# Patient Record
Sex: MALE | Race: WHITE | NOT HISPANIC OR LATINO | Employment: OTHER | URBAN - METROPOLITAN AREA
[De-identification: names, ages, dates, MRNs, and addresses within clinical notes are randomized per-mention and may not be internally consistent; named-entity substitution may affect disease eponyms.]

---

## 2017-01-06 ENCOUNTER — HOSPITAL ENCOUNTER (OUTPATIENT)
Dept: NUCLEAR MEDICINE | Facility: HOSPITAL | Age: 62
Discharge: HOME | End: 2017-01-06
Attending: INTERNAL MEDICINE

## 2017-01-06 DIAGNOSIS — K21.00 GASTROESOPHAGEAL REFLUX DISEASE WITH ESOPHAGITIS: ICD-10-CM

## 2017-01-06 PROCEDURE — A9541 TC99M SULFUR COLLOID: HCPCS | Performed by: INTERNAL MEDICINE

## 2017-01-06 PROCEDURE — 0 TECHNETIUM SULFUR COLLOID: Performed by: INTERNAL MEDICINE

## 2017-01-06 PROCEDURE — 78264 GASTRIC EMPTYING IMG STUDY: CPT

## 2017-01-06 RX ADMIN — Medication 1 DOSE: at 07:35

## 2017-01-09 ENCOUNTER — DOCUMENTATION (OUTPATIENT)
Dept: GASTROENTEROLOGY | Facility: CLINIC | Age: 62
End: 2017-01-09

## 2017-01-10 ENCOUNTER — TELEPHONE (OUTPATIENT)
Dept: GASTROENTEROLOGY | Facility: CLINIC | Age: 62
End: 2017-01-10

## 2017-01-10 NOTE — TELEPHONE ENCOUNTER
----- Message from Susi Nolen MD sent at 1/5/2017  4:15 PM EST -----  All biopsies from egd were normal. GES scheduled for tomorrow for further eval.  Schedule 4-6 wk op check with me or np

## 2017-01-10 NOTE — TELEPHONE ENCOUNTER
"Call to pt.  Advise per Dr Nolen that GES was normal - schedule f/u in 6-8 weeks.  Pt verb understanding and states \"well there is something wrong here - I'm having to take Carafate and Pantoprazole to keep my symptoms under control - so something must be wrong.  I don't want to wait 8 weeks to figure something out.\"   Advise pt will send message to Dr Nolen.  Pt verb understanding.    "

## 2017-01-10 NOTE — TELEPHONE ENCOUNTER
Call to pt.  Advise per DR Nolen that all bx from egd were normal.  Pt verb understanding - see note from 1/10 re: GES.

## 2017-01-18 ENCOUNTER — OFFICE VISIT (OUTPATIENT)
Dept: GASTROENTEROLOGY | Facility: CLINIC | Age: 62
End: 2017-01-18

## 2017-01-18 VITALS
SYSTOLIC BLOOD PRESSURE: 126 MMHG | HEIGHT: 68 IN | WEIGHT: 268.2 LBS | BODY MASS INDEX: 40.65 KG/M2 | DIASTOLIC BLOOD PRESSURE: 74 MMHG

## 2017-01-18 DIAGNOSIS — R12 HEARTBURN: Primary | ICD-10-CM

## 2017-01-18 DIAGNOSIS — R11.0 NAUSEA: ICD-10-CM

## 2017-01-18 PROCEDURE — 99213 OFFICE O/P EST LOW 20 MIN: CPT | Performed by: INTERNAL MEDICINE

## 2017-01-18 NOTE — MR AVS SNAPSHOT
Antony Tatum   2017 1:00 PM   Office Visit    Dept Phone:  [phone]   Encounter #:  [id number]    Provider:  Susi Nolen MD   Department:  [facility]                Your Full Care Plan              Your Updated Medication List          This list is accurate as of: 17  3:31 PM.  Always use your most recent med list.                aspirin 81 MG tablet       atorvastatin 20 MG tablet   Commonly known as:  LIPITOR       lisinopril 5 MG tablet   Commonly known as:  PRINIVIL,ZESTRIL       pantoprazole 40 MG EC tablet   Commonly known as:  PROTONIX   Take 1 tablet by mouth 2 (Two) Times a Day. TK ONE T PO QD       sucralfate 1 G tablet   Commonly known as:  CARAFATE   Take 1 tablet by mouth 3 (Three) Times a Day.       Testosterone Cypionate 200 MG/ML injection   Commonly known as:  DEPOTESTOTERONE CYPIONATE       zolpidem 10 MG tablet   Commonly known as:  AMBIEN               You Were Diagnosed With        Codes Comments    Heartburn    -  Primary ICD-10-CM: R12  ICD-9-CM: 787.1     Nausea     ICD-10-CM: R11.0  ICD-9-CM: 787.02       Instructions     None    Patient Instructions History      Upcoming Appointments     Visit Type Date Time Department    FOLLOW UP 2017  1:00 PM [facility]      [employer] Signup     [facility] [employer] allows you to send messages to your doctor, view your test results, renew your prescriptions, schedule appointments, and more. To sign up, go to [employer]Sheet and click on the Sign Up Now link in the New User? box. Enter your [employer] Activation Code exactly as it appears below along with the last four digits of your Social Security Number and your Date of Birth () to complete the sign-up process. If you do not sign up before the expiration date, you must request a new code.    [employer] Activation Code: [email or web address]  Expires: 2017  5:36 AM    If you have questions, you can  "email [email or web address] or call [phone] to talk to our MyChart staff. Remember, [email or web address] is NOT to be used for urgent needs. For medical emergencies, dial 911.               Other Info from Your Visit           Allergies     No Known Allergies      Reason for Visit     Follow-up GES results     Heartburn     Abdominal Pain           Vital Signs     Blood Pressure Height Weight Body Mass Index Smoking Status       126/74 68\" (172.7 cm) 268 lb 3.2 oz (122 kg) 40.78 kg/m2 Never Smoker       Problems and Diagnoses Noted     Heartburn    -  Primary    Nauseous            "

## 2017-01-18 NOTE — PROGRESS NOTES
Chief Complaint   Patient presents with   • Follow-up     GES results    • Heartburn   • Abdominal Pain       Antony Tatum is a  61 y.o. male here for a follow up visit for heartburn and nausea. This heartburn is well controlled with pantoprazole once a day.  There was a time he took it twice a day he has backed off on the with improvement in his symptoms.He recently underwent an EGDhe did have some mild erythema in the stomach however biopsies showed no inflammation.  Small bowel biopsies were negative for celiac disease.  I started him on Carafate at that time he has been using this to 3 times a day as needed.  He continues to have a low-level nausea most days.  He does not for up.  He does not have any abdominal pain.  He recently completed a gastric emptying test which was normal.  He has gallbladder out 5-6 years ago.  He has had a recent colonoscopy for screening.  He reports a previous CT scan 5-6 years ago at the time of his cholecystectomy.  He has had no recent abdominal imaging.  HPI  Past Medical History   Diagnosis Date   • Diverticulosis    • Erosive gastritis    • H/O CT scan of abdomen 01/29/2014     ddd, tics, tortuosity w/aortoiliac atherosclerosis, HH, cardiac enlargement   • Hepatitis    • Hiatal hernia    • History of CT scan of abdomen    • Hyperlipidemia    • Hypertension    • Obese      Past Surgical History   Procedure Laterality Date   • Cervical discectomy       anterior spinal, osteophytectomy cerv interspace   • Coronary angioplasty with stent placement     • Gallbladder surgery     • Cervical discectomy  12/07/2015     spinal; C5-C6 and C6-C7 anterior cervical discectomy and arthrodesis; Jon Lua   • Upper gastrointestinal endoscopy  02/12/2014     LA Grade A reflux esophagitis, HH, erosive gtastritis, bx   • Cholecystectomy     • Colonoscopy  06/10/2016     normal -Strothman M.D.   • Endoscopy N/A 12/28/2016     Procedure: ESOPHAGOGASTRODUODENOSCOPY with Biopsies;  Surgeon:  Susi Nolen MD;  Location: [facility] ENDOSCOPY;  Service:        Current Outpatient Prescriptions:   •  aspirin 81 MG tablet, Take by mouth., Disp: , Rfl:   •  atorvastatin (LIPITOR) 20 MG tablet, Take  by mouth. Take one tablet three days a week, Disp: , Rfl:   •  lisinopril (PRINIVIL,ZESTRIL) 5 MG tablet, Take  by mouth. Take one tablet three days a week, Disp: , Rfl:   •  pantoprazole (PROTONIX) 40 MG EC tablet, Take 1 tablet by mouth 2 (Two) Times a Day. TK ONE T PO QD, Disp: 60 tablet, Rfl: 11  •  sucralfate (CARAFATE) 1 G tablet, Take 1 tablet by mouth 3 (Three) Times a Day., Disp: 90 tablet, Rfl: 3  •  Testosterone Cypionate (DEPOTESTOTERONE CYPIONATE) 200 MG/ML injection, INJECT 1 ML Q 2 WEEKS, Disp: , Rfl: 0  •  zolpidem (AMBIEN) 10 MG tablet, at night as needed., Disp: , Rfl: 1  PRN Meds:.  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   Gastrointestinal: Positive for nausea. Negative for abdominal distention, abdominal pain, constipation and diarrhea.     Vitals:    01/18/17 1309   BP: 126/74     Last Weight    01/18/17  1309   Weight: 268 lb 3.2 oz (122 kg)     Physical Exam   Constitutional: He is oriented to person, place, and time. He appears well-developed and well-nourished.   HENT:   Head: Normocephalic and atraumatic.   Eyes: Conjunctivae are normal. No scleral icterus.   Neck: Normal  range of motion. Neck supple.   Cardiovascular: Normal rate and regular rhythm.    Pulmonary/Chest: Effort normal and breath sounds normal.   Musculoskeletal: Normal range of motion. He exhibits no edema.   Neurological: He is alert and oriented to person, place, and time.   Skin: Skin is warm and dry.   Psychiatric: He has a normal mood and affect.   Nursing note and vitals reviewed.    No images are attached to the encounter.  Diagnoses and all orders for this visit:    Heartburn    Nausea    Plan-  -Long discussion with the patient about his symptoms, diet modification and risks associated with long-term use of PPIs.  He is stable on pantoprazole once a day.  I have given him samples of Dexilant to see if he has any improvement in his nausea with this medication.  I have stressed to him the importance of diet modification and weight loss as these will probably improve his acid reflux symptoms significantly.  I have also asked him to consider having a CT scan of his abdomen and pelvis giving has ongoing nausea to evaluate his pancreas.  He does not consume excessive caffeine or alcohol and he does not smoke.  He will contact me if he would like to try Dexilant or if he wishes to proceed with a CT scan.

## 2017-02-02 ENCOUNTER — TELEPHONE (OUTPATIENT)
Dept: GASTROENTEROLOGY | Facility: CLINIC | Age: 62
End: 2017-02-02

## 2017-02-02 RX ORDER — PANTOPRAZOLE SODIUM 40 MG/1
40 TABLET, DELAYED RELEASE ORAL 2 TIMES DAILY
Qty: 60 TABLET | Refills: 11 | Status: SHIPPED | OUTPATIENT
Start: 2017-02-02 | End: 2018-01-17 | Stop reason: SDUPTHER

## 2017-02-02 NOTE — TELEPHONE ENCOUNTER
Called pt and pt confirmed he takes pantoprazole 40mg every day and many nights he takes a night time dose.  Advised we would escribe pantoprazole 40mg po bid to his [facility] pharmacy..  Pt verb understanding.

## 2017-02-02 NOTE — TELEPHONE ENCOUNTER
----- Message from Jessi Sanon sent at 2/2/2017 12:06 PM EST -----  Regarding: PT CALLED - PANTOPRAZOLE 40 MG BID   Contact: [phone]  PT WAS ADVISED TO CALL IF HE NEEDED A SCRIPT. PHARM [facility].

## 2017-03-17 ENCOUNTER — TRANSCRIBE ORDERS (OUTPATIENT)
Dept: ADMINISTRATIVE | Facility: HOSPITAL | Age: 62
End: 2017-03-17

## 2017-03-17 DIAGNOSIS — R07.9 CHEST PAIN, UNSPECIFIED TYPE: Primary | ICD-10-CM

## 2017-03-27 ENCOUNTER — APPOINTMENT (OUTPATIENT)
Dept: CARDIOLOGY | Facility: HOSPITAL | Age: 62
End: 2017-03-27

## 2017-03-28 ENCOUNTER — APPOINTMENT (OUTPATIENT)
Dept: CARDIOLOGY | Facility: HOSPITAL | Age: 62
End: 2017-03-28

## 2017-03-28 ENCOUNTER — HOSPITAL ENCOUNTER (OUTPATIENT)
Dept: CARDIOLOGY | Facility: HOSPITAL | Age: 62
Discharge: HOME | End: 2017-03-28
Admitting: PHYSICIAN ASSISTANT

## 2017-03-28 DIAGNOSIS — R07.9 CHEST PAIN, UNSPECIFIED TYPE: ICD-10-CM

## 2017-03-28 LAB
BH CV STRESS BP STAGE 1: NORMAL
BH CV STRESS BP STAGE 2: NORMAL
BH CV STRESS BP STAGE 3: NORMAL
BH CV STRESS DURATION MIN STAGE 1: 3
BH CV STRESS DURATION MIN STAGE 2: 3
BH CV STRESS DURATION MIN STAGE 3: 1
BH CV STRESS DURATION SEC STAGE 1: 0
BH CV STRESS DURATION SEC STAGE 2: 0
BH CV STRESS DURATION SEC STAGE 3: 45
BH CV STRESS GRADE STAGE 1: 10
BH CV STRESS GRADE STAGE 2: 12
BH CV STRESS GRADE STAGE 3: 14
BH CV STRESS HR STAGE 1: 106
BH CV STRESS HR STAGE 2: 121
BH CV STRESS HR STAGE 3: 133
BH CV STRESS METS STAGE 1: 5
BH CV STRESS METS STAGE 2: 7.5
BH CV STRESS METS STAGE 3: 10
BH CV STRESS PROTOCOL 1: NORMAL
BH CV STRESS RECOVERY BP: NORMAL MMHG
BH CV STRESS RECOVERY HR: 100 BPM
BH CV STRESS SPEED STAGE 1: 1.7
BH CV STRESS SPEED STAGE 2: 2.5
BH CV STRESS SPEED STAGE 3: 3.4
BH CV STRESS STAGE 1: 1
BH CV STRESS STAGE 2: 2
BH CV STRESS STAGE 3: 3
MAXIMAL PREDICTED HEART RATE: 157 BPM
PERCENT MAX PREDICTED HR: 85.35 %
STRESS BASELINE BP: NORMAL MMHG
STRESS BASELINE HR: 80 BPM
STRESS PERCENT HR: 100 %
STRESS POST ESTIMATED WORKLOAD: 8.9 METS
STRESS POST EXERCISE DUR MIN: 7 MIN
STRESS POST EXERCISE DUR SEC: 45 SEC
STRESS POST PEAK BP: NORMAL MMHG
STRESS POST PEAK HR: 134 BPM
STRESS TARGET HR: 133 BPM

## 2017-03-28 PROCEDURE — 93017 CV STRESS TEST TRACING ONLY: CPT

## 2017-03-28 PROCEDURE — 93016 CV STRESS TEST SUPVJ ONLY: CPT | Performed by: INTERNAL MEDICINE

## 2017-03-28 PROCEDURE — 93018 CV STRESS TEST I&R ONLY: CPT | Performed by: INTERNAL MEDICINE

## 2017-04-06 ENCOUNTER — HOSPITAL ENCOUNTER (OUTPATIENT)
Dept: CARDIOLOGY | Facility: HOSPITAL | Age: 62
Discharge: HOME | End: 2017-04-06
Admitting: PHYSICIAN ASSISTANT

## 2017-04-06 VITALS
HEIGHT: 68 IN | BODY MASS INDEX: 40.16 KG/M2 | HEART RATE: 74 BPM | WEIGHT: 265 LBS | DIASTOLIC BLOOD PRESSURE: 82 MMHG | SYSTOLIC BLOOD PRESSURE: 152 MMHG

## 2017-04-06 DIAGNOSIS — R07.9 CHEST PAIN, UNSPECIFIED TYPE: ICD-10-CM

## 2017-04-06 LAB
ASCENDING AORTA: 3.9 CM
BH CV ECHO MEAS - AO MAX PG (FULL): 10 MMHG
BH CV ECHO MEAS - AO MAX PG: 13.8 MMHG
BH CV ECHO MEAS - AO MEAN PG (FULL): 4.4 MMHG
BH CV ECHO MEAS - AO MEAN PG: 6.5 MMHG
BH CV ECHO MEAS - AO ROOT AREA (BSA CORRECTED): 1.7
BH CV ECHO MEAS - AO ROOT AREA: 12.5 CM^2
BH CV ECHO MEAS - AO ROOT DIAM: 4 CM
BH CV ECHO MEAS - AO V2 MAX: 185.7 CM/SEC
BH CV ECHO MEAS - AO V2 MEAN: 112.9 CM/SEC
BH CV ECHO MEAS - AO V2 VTI: 31.4 CM
BH CV ECHO MEAS - AVA(I,A): 2 CM^2
BH CV ECHO MEAS - AVA(I,D): 2 CM^2
BH CV ECHO MEAS - AVA(V,A): 1.9 CM^2
BH CV ECHO MEAS - AVA(V,D): 1.9 CM^2
BH CV ECHO MEAS - BSA(HAYCOCK): 2.5 M^2
BH CV ECHO MEAS - BSA: 2.3 M^2
BH CV ECHO MEAS - BZI_BMI: 40.3 KILOGRAMS/M^2
BH CV ECHO MEAS - BZI_METRIC_HEIGHT: 172.7 CM
BH CV ECHO MEAS - BZI_METRIC_WEIGHT: 120.2 KG
BH CV ECHO MEAS - CONTRAST EF (2CH): 57.3 ML/M^2
BH CV ECHO MEAS - CONTRAST EF 4CH: 53.1 ML/M^2
BH CV ECHO MEAS - EDV(MOD-SP2): 75 ML
BH CV ECHO MEAS - EDV(MOD-SP4): 96 ML
BH CV ECHO MEAS - EDV(TEICH): 152.8 ML
BH CV ECHO MEAS - EF(CUBED): 84.3 %
BH CV ECHO MEAS - EF(MOD-SP2): 57.3 %
BH CV ECHO MEAS - EF(MOD-SP4): 53.1 %
BH CV ECHO MEAS - EF(TEICH): 76.9 %
BH CV ECHO MEAS - ESV(MOD-SP2): 32 ML
BH CV ECHO MEAS - ESV(MOD-SP4): 45 ML
BH CV ECHO MEAS - ESV(TEICH): 35.3 ML
BH CV ECHO MEAS - FS: 46.1 %
BH CV ECHO MEAS - IVS/LVPW: 0.96
BH CV ECHO MEAS - IVSD: 1.3 CM
BH CV ECHO MEAS - LAT PEAK E' VEL: 10 CM/SEC
BH CV ECHO MEAS - LV DIASTOLIC VOL/BSA (35-75): 41.7 ML/M^2
BH CV ECHO MEAS - LV MASS(C)D: 307.8 GRAMS
BH CV ECHO MEAS - LV MASS(C)DI: 133.6 GRAMS/M^2
BH CV ECHO MEAS - LV MAX PG: 3.8 MMHG
BH CV ECHO MEAS - LV MEAN PG: 2 MMHG
BH CV ECHO MEAS - LV SYSTOLIC VOL/BSA (12-30): 19.5 ML/M^2
BH CV ECHO MEAS - LV V1 MAX: 97.5 CM/SEC
BH CV ECHO MEAS - LV V1 MEAN: 64.4 CM/SEC
BH CV ECHO MEAS - LV V1 VTI: 17.5 CM
BH CV ECHO MEAS - LVIDD: 5.6 CM
BH CV ECHO MEAS - LVIDS: 3 CM
BH CV ECHO MEAS - LVLD AP2: 8.6 CM
BH CV ECHO MEAS - LVLD AP4: 8.8 CM
BH CV ECHO MEAS - LVLS AP2: 7.3 CM
BH CV ECHO MEAS - LVLS AP4: 7.8 CM
BH CV ECHO MEAS - LVOT AREA (M): 3.5 CM^2
BH CV ECHO MEAS - LVOT AREA: 3.5 CM^2
BH CV ECHO MEAS - LVOT DIAM: 2.1 CM
BH CV ECHO MEAS - LVPWD: 1.3 CM
BH CV ECHO MEAS - MED PEAK E' VEL: 6 CM/SEC
BH CV ECHO MEAS - MV A DUR: 0.16 SEC
BH CV ECHO MEAS - MV A MAX VEL: 86.3 CM/SEC
BH CV ECHO MEAS - MV DEC SLOPE: 253.9 CM/SEC^2
BH CV ECHO MEAS - MV DEC TIME: 0.23 SEC
BH CV ECHO MEAS - MV E MAX VEL: 58.2 CM/SEC
BH CV ECHO MEAS - MV E/A: 0.67
BH CV ECHO MEAS - MV MAX PG: 2.7 MMHG
BH CV ECHO MEAS - MV MEAN PG: 1.4 MMHG
BH CV ECHO MEAS - MV P1/2T MAX VEL: 63.5 CM/SEC
BH CV ECHO MEAS - MV P1/2T: 73.3 MSEC
BH CV ECHO MEAS - MV V2 MAX: 82.5 CM/SEC
BH CV ECHO MEAS - MV V2 MEAN: 56.3 CM/SEC
BH CV ECHO MEAS - MV V2 VTI: 23.8 CM
BH CV ECHO MEAS - MVA P1/2T LCG: 3.5 CM^2
BH CV ECHO MEAS - MVA(P1/2T): 3 CM^2
BH CV ECHO MEAS - MVA(VTI): 2.6 CM^2
BH CV ECHO MEAS - PA ACC TIME: 0.09 SEC
BH CV ECHO MEAS - PA MAX PG (FULL): 1.4 MMHG
BH CV ECHO MEAS - PA MAX PG: 3.7 MMHG
BH CV ECHO MEAS - PA PR(ACCEL): 37.8 MMHG
BH CV ECHO MEAS - PA V2 MAX: 95.6 CM/SEC
BH CV ECHO MEAS - PULM A REVS DUR: 0.13 SEC
BH CV ECHO MEAS - PULM A REVS VEL: 64.3 CM/SEC
BH CV ECHO MEAS - PULM DIAS VEL: 23.5 CM/SEC
BH CV ECHO MEAS - PULM S/D: 1.9
BH CV ECHO MEAS - PULM SYS VEL: 45.7 CM/SEC
BH CV ECHO MEAS - PVA(V,A): 3.4 CM^2
BH CV ECHO MEAS - PVA(V,D): 3.4 CM^2
BH CV ECHO MEAS - QP/QS: 0.8
BH CV ECHO MEAS - RV MAX PG: 2.2 MMHG
BH CV ECHO MEAS - RV MEAN PG: 1.2 MMHG
BH CV ECHO MEAS - RV V1 MAX: 74.2 CM/SEC
BH CV ECHO MEAS - RV V1 MEAN: 52.1 CM/SEC
BH CV ECHO MEAS - RV V1 VTI: 11.2 CM
BH CV ECHO MEAS - RVOT AREA: 4.4 CM^2
BH CV ECHO MEAS - RVOT DIAM: 2.4 CM
BH CV ECHO MEAS - SI(AO): 170 ML/M^2
BH CV ECHO MEAS - SI(CUBED): 63.8 ML/M^2
BH CV ECHO MEAS - SI(LVOT): 26.7 ML/M^2
BH CV ECHO MEAS - SI(MOD-SP2): 18.7 ML/M^2
BH CV ECHO MEAS - SI(MOD-SP4): 22.1 ML/M^2
BH CV ECHO MEAS - SI(TEICH): 51 ML/M^2
BH CV ECHO MEAS - SV(AO): 391.6 ML
BH CV ECHO MEAS - SV(CUBED): 147.1 ML
BH CV ECHO MEAS - SV(LVOT): 61.6 ML
BH CV ECHO MEAS - SV(MOD-SP2): 43 ML
BH CV ECHO MEAS - SV(MOD-SP4): 51 ML
BH CV ECHO MEAS - SV(RVOT): 49.2 ML
BH CV ECHO MEAS - SV(TEICH): 117.5 ML
BH CV ECHO MEAS - TAPSE (>1.6): 2.4 CM2
BH CV ECHO MEASUREMENTS AVERAGE E/E' RATIO: 7.28
BH CV XLRA - RV BASE: 2.8 CM
BH CV XLRA - TDI S': 12 CM/SEC
E/E' RATIO: 7
LEFT ATRIUM VOLUME INDEX: 16 ML/M2
LV EF 2D ECHO EST: 53 %
SINUS: 3.3 CM
STJ: 3.8 CM

## 2017-04-06 PROCEDURE — 0399T ADULT TRANSTHORACIC ECHO COMPLETE: CPT | Performed by: INTERNAL MEDICINE

## 2017-04-06 PROCEDURE — 93306 TTE W/DOPPLER COMPLETE: CPT | Performed by: INTERNAL MEDICINE

## 2017-04-06 PROCEDURE — 93306 TTE W/DOPPLER COMPLETE: CPT

## 2017-04-06 PROCEDURE — 0399T HC MYOCARDL STRAIN IMAG QUAN ASSMT PER SESS: CPT

## 2017-08-30 ENCOUNTER — OFFICE VISIT (OUTPATIENT)
Dept: FAMILY MEDICINE CLINIC | Facility: CLINIC | Age: 62
End: 2017-08-30

## 2017-08-30 VITALS
WEIGHT: 267 LBS | BODY MASS INDEX: 40.47 KG/M2 | OXYGEN SATURATION: 95 % | HEIGHT: 68 IN | HEART RATE: 70 BPM | DIASTOLIC BLOOD PRESSURE: 78 MMHG | TEMPERATURE: 97.9 F | SYSTOLIC BLOOD PRESSURE: 124 MMHG

## 2017-08-30 DIAGNOSIS — M50.30 DEGENERATION OF INTERVERTEBRAL DISC OF CERVICAL REGION: Primary | ICD-10-CM

## 2017-08-30 DIAGNOSIS — E66.9 ADIPOSITY: ICD-10-CM

## 2017-08-30 DIAGNOSIS — K21.9 GASTROESOPHAGEAL REFLUX DISEASE, ESOPHAGITIS PRESENCE NOT SPECIFIED: ICD-10-CM

## 2017-08-30 DIAGNOSIS — I25.10 CORONARY ARTERY DISEASE INVOLVING NATIVE HEART WITHOUT ANGINA PECTORIS, UNSPECIFIED VESSEL OR LESION TYPE: ICD-10-CM

## 2017-08-30 DIAGNOSIS — E78.5 HYPERLIPIDEMIA, UNSPECIFIED HYPERLIPIDEMIA TYPE: ICD-10-CM

## 2017-08-30 DIAGNOSIS — I10 ESSENTIAL HYPERTENSION: ICD-10-CM

## 2017-08-30 PROCEDURE — 99204 OFFICE O/P NEW MOD 45 MIN: CPT | Performed by: INTERNAL MEDICINE

## 2017-08-30 NOTE — PROGRESS NOTES
Subjective   Antony Tatum is a 61 y.o. male. Patient is here today for establishing care as a new patient.    He has known problems of hypertension and is taking lisinopril 5 mg daily for that.  He also has hyperlipidemia and is on atorvastatin 20 mg for that.  He has GERD that's controlled by pantoprazole and sucralfate and sees Dr. Susi Nolen.  He also has a history of coronary artery disease and had a stent in  and has a cardiologist with [facility].  His only complaint today is some left breast tenderness that is been going on for about 6 months.  He formerly was on some testosterone injections every 2 weeks.    Past medical history-history of spinal stenosis with spinal surgery on the cervical spine twice and currently asymptomatic.  He also has had cholecystectomy in the past    Social history patient , is a nonsmoker and has about 2 alcoholic drinks a week.    Family history the patient's mother had diabetes and is .  Patient's father is living and healthy as far as I know.    Chief Complaint   Patient presents with   • Hyperlipidemia     HTN- PT HERE TO ESTABLISH AS NEW PATIENT          Vitals:    17 1401   BP: 124/78   Pulse: 70   Temp: 97.9 °F (36.6 °C)   SpO2: 95%     The following portions of the patient's history were reviewed and updated as appropriate: allergies, current medications, past family history, past medical history, past social history, past surgical history and problem list.    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 Take one tablet three days a week, Disp: , Rfl:   •  lisinopril (PRINIVIL,ZESTRIL) 5 MG tablet, Take  by mouth. Take one tablet three days a week, Disp: , Rfl:   •  pantoprazole (PROTONIX) 40 MG EC tablet, Take 1 tablet by mouth 2 (Two) Times a Day. TK ONE T PO QD, Disp: 60 tablet, Rfl: 11  •  sucralfate (CARAFATE) 1 G tablet, Take 1 tablet by mouth 3 (Three) Times a Day., Disp: 90 tablet, Rfl: 3     Objective     History of Present Illness     Review of Systems   Constitutional: Negative.    HENT: Negative.    Eyes: Negative.    Respiratory: Negative.    Cardiovascular: Negative.    Gastrointestinal: Negative.    Genitourinary: Negative.    Musculoskeletal: Negative.    Skin: Negative.         Left breast tenderness   Neurological: Negative.    Psychiatric/Behavioral: Negative.        Physical Exam   Constitutional: He is oriented to person, place, and time. He appears well-developed and well-nourished.   Pleasant, cooperative and in no distress but obese with a blood pressure of 120/80   HENT:   Head: Normocephalic and atraumatic.   Eyes: Conjunctivae are normal. Pupils are equal, round, and reactive to light.   Neck: Normal range of motion. Neck supple. No thyromegaly present.   Cardiovascular: Normal rate, regular rhythm and normal heart sounds.    No murmur heard.  Pulmonary/Chest: Effort normal and breath sounds normal. No  respiratory distress. He has no wheezes. He has no rales.   Abdominal: Soft. Bowel sounds are normal.   Musculoskeletal: Normal range of motion. He exhibits no edema.   Neurological: He is alert and oriented to person, place, and time.   Skin: Skin is warm and dry.   There is some slight left breast tenderness but I can feel no definite masses and see no skin change   Psychiatric: He has a normal mood and affect. His behavior is normal.   Nursing note and vitals reviewed.      ASSESSMENT  new patient with problems of hypertension, hyperlipidemia, GERD, obesity, and coronary artery disease with history of stent placement in 2013 and spinal cervical operations ×2.  The patient also was recently getting testosterone injections for presumed hypogonadism     Problem List Items Addressed This Visit        Cardiovascular and Mediastinum    Hyperlipidemia    Hypertension    Coronary artery disease       Digestive    Adiposity       Musculoskeletal and Integument    Degeneration of intervertebral disc of cervical region - Primary          PLAN  I want to get records from his previous physician Dr. Han or Allison and also records from his cardiologist.  The patient will continue his current medicines as now.  I would like to recheck him in about one month with a CBC, CMP, lipid panel, PSA, TSH and testosterone total and free    There are no Patient Instructions on file for this visit.  Return in about 1 month (around 9/30/2017) for with labs.

## 2017-09-27 DIAGNOSIS — E78.5 HYPERLIPIDEMIA, UNSPECIFIED HYPERLIPIDEMIA TYPE: ICD-10-CM

## 2017-09-27 DIAGNOSIS — Z11.59 NEED FOR HEPATITIS C SCREENING TEST: ICD-10-CM

## 2017-09-27 DIAGNOSIS — I10 ESSENTIAL HYPERTENSION: ICD-10-CM

## 2017-09-27 DIAGNOSIS — Z12.5 ENCOUNTER FOR SCREENING FOR MALIGNANT NEOPLASM OF PROSTATE: Primary | ICD-10-CM

## 2017-09-27 DIAGNOSIS — R79.89 LOW TESTOSTERONE LEVEL IN MALE: ICD-10-CM

## 2017-10-02 ENCOUNTER — CLINICAL SUPPORT (OUTPATIENT)
Dept: FAMILY MEDICINE CLINIC | Facility: CLINIC | Age: 62
End: 2017-10-02

## 2017-10-02 DIAGNOSIS — Z23 NEED FOR VACCINATION: Primary | ICD-10-CM

## 2017-10-02 PROCEDURE — 90686 IIV4 VACC NO PRSV 0.5 ML IM: CPT | Performed by: INTERNAL MEDICINE

## 2017-10-02 PROCEDURE — 90471 IMMUNIZATION ADMIN: CPT | Performed by: INTERNAL MEDICINE

## 2017-10-04 LAB
ALBUMIN SERPL-MCNC: 5 G/DL (ref 3.5–5.2)
ALBUMIN/GLOB SERPL: 2.4 G/DL
ALP SERPL-CCNC: 104 U/L (ref 39–117)
ALT SERPL-CCNC: 34 U/L (ref 1–41)
AST SERPL-CCNC: 24 U/L (ref 1–40)
BASOPHILS # BLD AUTO: 0.05 10*3/MM3 (ref 0–0.2)
BASOPHILS NFR BLD AUTO: 0.6 % (ref 0–1.5)
BILIRUB SERPL-MCNC: 0.7 MG/DL (ref 0.1–1.2)
BUN SERPL-MCNC: 18 MG/DL (ref 8–23)
BUN/CREAT SERPL: 22.8 (ref 7–25)
CALCIUM SERPL-MCNC: 9.9 MG/DL (ref 8.6–10.5)
CHLORIDE SERPL-SCNC: 101 MMOL/L (ref 98–107)
CHOLEST SERPL-MCNC: 167 MG/DL (ref 0–200)
CO2 SERPL-SCNC: 27.7 MMOL/L (ref 22–29)
CREAT SERPL-MCNC: 0.79 MG/DL (ref 0.76–1.27)
EOSINOPHIL # BLD AUTO: 0.36 10*3/MM3 (ref 0–0.7)
EOSINOPHIL NFR BLD AUTO: 4.4 % (ref 0.3–6.2)
ERYTHROCYTE [DISTWIDTH] IN BLOOD BY AUTOMATED COUNT: 13.6 % (ref 11.5–14.5)
GLOBULIN SER CALC-MCNC: 2.1 GM/DL
GLUCOSE SERPL-MCNC: 99 MG/DL (ref 65–99)
HCT VFR BLD AUTO: 45.9 % (ref 40.4–52.2)
HCV AB S/CO SERPL IA: <0.1 S/CO RATIO (ref 0–0.9)
HCV AB SERPL QL IA: NORMAL
HDLC SERPL-MCNC: 49 MG/DL (ref 40–60)
HGB BLD-MCNC: 15 G/DL (ref 13.7–17.6)
IMM GRANULOCYTES # BLD: 0.02 10*3/MM3 (ref 0–0.03)
IMM GRANULOCYTES NFR BLD: 0.2 % (ref 0–0.5)
LDLC SERPL CALC-MCNC: 82 MG/DL (ref 0–100)
LDLC/HDLC SERPL: 1.67 {RATIO}
LYMPHOCYTES # BLD AUTO: 1.94 10*3/MM3 (ref 0.9–4.8)
LYMPHOCYTES NFR BLD AUTO: 23.9 % (ref 19.6–45.3)
MCH RBC QN AUTO: 30.9 PG (ref 27–32.7)
MCHC RBC AUTO-ENTMCNC: 32.7 G/DL (ref 32.6–36.4)
MCV RBC AUTO: 94.4 FL (ref 79.8–96.2)
MONOCYTES # BLD AUTO: 0.66 10*3/MM3 (ref 0.2–1.2)
MONOCYTES NFR BLD AUTO: 8.1 % (ref 5–12)
NEUTROPHILS # BLD AUTO: 5.09 10*3/MM3 (ref 1.9–8.1)
NEUTROPHILS NFR BLD AUTO: 62.8 % (ref 42.7–76)
PLATELET # BLD AUTO: 247 10*3/MM3 (ref 140–500)
POTASSIUM SERPL-SCNC: 4.5 MMOL/L (ref 3.5–5.2)
PROT SERPL-MCNC: 7.1 G/DL (ref 6–8.5)
PSA SERPL-MCNC: 0.92 NG/ML (ref 0–4)
RBC # BLD AUTO: 4.86 10*6/MM3 (ref 4.6–6)
SODIUM SERPL-SCNC: 145 MMOL/L (ref 136–145)
TESTOST FREE SERPL-MCNC: 5.5 PG/ML (ref 6.6–18.1)
TESTOST SERPL-MCNC: 159 NG/DL (ref 264–916)
TRIGL SERPL-MCNC: 182 MG/DL (ref 0–150)
TSH SERPL DL<=0.005 MIU/L-ACNC: 2.52 MIU/ML (ref 0.27–4.2)
VLDLC SERPL CALC-MCNC: 36.4 MG/DL (ref 5–40)
WBC # BLD AUTO: 8.12 10*3/MM3 (ref 4.5–10.7)

## 2017-10-12 ENCOUNTER — OFFICE VISIT (OUTPATIENT)
Dept: FAMILY MEDICINE CLINIC | Facility: CLINIC | Age: 62
End: 2017-10-12

## 2017-10-12 VITALS
TEMPERATURE: 98.3 F | DIASTOLIC BLOOD PRESSURE: 80 MMHG | SYSTOLIC BLOOD PRESSURE: 126 MMHG | WEIGHT: 271.4 LBS | OXYGEN SATURATION: 93 % | BODY MASS INDEX: 41.13 KG/M2 | HEART RATE: 74 BPM | HEIGHT: 68 IN

## 2017-10-12 DIAGNOSIS — K21.9 GASTROESOPHAGEAL REFLUX DISEASE, ESOPHAGITIS PRESENCE NOT SPECIFIED: ICD-10-CM

## 2017-10-12 DIAGNOSIS — I10 ESSENTIAL HYPERTENSION: ICD-10-CM

## 2017-10-12 DIAGNOSIS — E78.5 HYPERLIPIDEMIA, UNSPECIFIED HYPERLIPIDEMIA TYPE: ICD-10-CM

## 2017-10-12 DIAGNOSIS — E29.1 TESTOSTERONE DEFICIENCY IN MALE: ICD-10-CM

## 2017-10-12 DIAGNOSIS — I25.10 CORONARY ARTERY DISEASE INVOLVING NATIVE HEART WITHOUT ANGINA PECTORIS, UNSPECIFIED VESSEL OR LESION TYPE: Primary | ICD-10-CM

## 2017-10-12 DIAGNOSIS — IMO0001 CLASS 3 OBESITY DUE TO EXCESS CALORIES WITHOUT SERIOUS COMORBIDITY WITH BODY MASS INDEX (BMI) OF 40.0 TO 44.9 IN ADULT: ICD-10-CM

## 2017-10-12 PROCEDURE — 99214 OFFICE O/P EST MOD 30 MIN: CPT | Performed by: INTERNAL MEDICINE

## 2017-10-12 NOTE — PROGRESS NOTES
Louise Tatum is a 61 y.o. male. Patient is here today for follow-up on his hypertension, hyperlipidemia, coronary artery disease, GERD, obesity and history of testosterone deficiency.  He generally is feeling okay.  He still has some mild left breast discomfort but no masses.  He's had no chest pain, shortness of breath, edema or significant myalgias.  His weight is up about 4 pounds.  He is here to review laboratory studies.  His GERD is well controlled on sucralfate and Protonix    Chief Complaint   Patient presents with   • Hyperlipidemia     lab follow up           Vitals:    10/12/17 0800   BP: 126/80   Pulse: 74   Temp: 98.3 °F (36.8 °C)   SpO2: 93%     The following portions of the patient's history were reviewed and updated as appropriate: allergies, current medications, past family history, past medical history, past social history, past surgical history and problem list.    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 Take one tablet three days a week, Disp: , Rfl:   •  lisinopril (PRINIVIL,ZESTRIL) 5 MG tablet, Take  by mouth. Take one tablet three days a week, Disp: , Rfl:   •  pantoprazole (PROTONIX) 40 MG EC tablet, Take 1 tablet by mouth 2 (Two) Times a Day. TK ONE T PO QD, Disp: 60 tablet, Rfl: 11  •  sucralfate (CARAFATE) 1 G tablet, Take 1 tablet by mouth 3 (Three) Times a Day., Disp: 90 tablet, Rfl: 3     Objective     History of Present Illness     Review of Systems   Constitutional: Negative.    HENT: Negative.    Eyes: Negative.    Respiratory: Negative.    Cardiovascular: Negative.    Gastrointestinal: Negative.    Endocrine: Negative.    Genitourinary: Negative.    Musculoskeletal: Negative.    Skin: Negative.    Neurological: Negative.    Psychiatric/Behavioral: Negative.        Physical Exam   Constitutional: He is oriented to person, place, and time. He appears well-developed and well-nourished.   Stocky pleasant white male in no acute distress but overweight with a blood pressure of 110/70   HENT:   Head: Normocephalic and atraumatic.   Eyes: Conjunctivae are normal. Pupils are equal, round, and reactive to light. No scleral icterus.   Neck: Normal range of motion. Neck supple. No thyromegaly present.   Cardiovascular: Normal rate, regular rhythm and normal heart sounds.    Pulmonary/Chest: Effort normal and breath sounds normal. No respiratory distress. He has no wheezes. He has no rales.   Abdominal: Soft.   Musculoskeletal: Normal range of motion. He exhibits no edema.   Neurological: He is alert and oriented to person, place, and time.   Skin: Skin is warm and dry.   Psychiatric: He has a normal mood and affect. His behavior is normal.   Nursing note and vitals reviewed.      ASSESSMENT  CBC was normal.  CMP also was completely normal.  PSA and TSH were normal and hepatitis C screen was negative.  Testosterone level was low but the patient does not want to go on supplement.  Lipid  panel is reasonable with a total cholesterol 167, HDL 49, LDL of 82 and triglycerides of 182.  #1-hypertension, well controlled on lisinopril  #2-hyperlipidemia, adequate control on atorvastatin  #3-history of coronary artery disease, asymptomatic  #4-GERD, controlled with Protonix and sucralfate  #5-obesity with a 4 pound weight gain  #6-testosterone deficiency, not currently treated     Problem List Items Addressed This Visit        Cardiovascular and Mediastinum    Hyperlipidemia    Hypertension    Coronary artery disease - Primary       Digestive    Adiposity    GERD (gastroesophageal reflux disease)       Endocrine    Testosterone deficiency in male          PLAN  The patient is going to continue his current medicines and I'll check him in 4 months with a CMP, lipid panel    There are no Patient Instructions on file for this visit.  No Follow-up on file.

## 2017-11-21 ENCOUNTER — OFFICE VISIT (OUTPATIENT)
Dept: FAMILY MEDICINE CLINIC | Facility: CLINIC | Age: 62
End: 2017-11-21

## 2017-11-21 VITALS
OXYGEN SATURATION: 94 % | HEART RATE: 69 BPM | BODY MASS INDEX: 41.4 KG/M2 | RESPIRATION RATE: 16 BRPM | HEIGHT: 68 IN | DIASTOLIC BLOOD PRESSURE: 64 MMHG | WEIGHT: 273.2 LBS | TEMPERATURE: 98.6 F | SYSTOLIC BLOOD PRESSURE: 140 MMHG

## 2017-11-21 DIAGNOSIS — J20.9 ACUTE BRONCHITIS, UNSPECIFIED ORGANISM: Primary | ICD-10-CM

## 2017-11-21 PROCEDURE — 99213 OFFICE O/P EST LOW 20 MIN: CPT | Performed by: NURSE PRACTITIONER

## 2017-11-21 RX ORDER — AZITHROMYCIN 250 MG/1
TABLET, FILM COATED ORAL
Qty: 6 TABLET | Refills: 0 | Status: SHIPPED | OUTPATIENT
Start: 2017-11-21 | End: 2018-01-17

## 2017-11-21 RX ORDER — METHYLPREDNISOLONE 4 MG/1
TABLET ORAL
Qty: 21 TABLET | Refills: 0 | Status: SHIPPED | OUTPATIENT
Start: 2017-11-21 | End: 2018-01-17

## 2017-12-01 ENCOUNTER — DOCUMENTATION (OUTPATIENT)
Dept: GASTROENTEROLOGY | Facility: CLINIC | Age: 62
End: 2017-12-01

## 2017-12-01 NOTE — PROGRESS NOTES
PA submitted through [facility] for Pantoprazole Sodium 40MG dr tablets.    PA has been approved

## 2018-01-05 RX ORDER — SUCRALFATE 1 G/1
TABLET ORAL
Qty: 90 TABLET | Refills: 0 | OUTPATIENT
Start: 2018-01-05

## 2018-01-08 ENCOUNTER — TELEPHONE (OUTPATIENT)
Dept: GASTROENTEROLOGY | Facility: CLINIC | Age: 63
End: 2018-01-08

## 2018-01-08 RX ORDER — SUCRALFATE 1 G/1
1 TABLET ORAL 3 TIMES DAILY
Qty: 90 TABLET | Refills: 0 | Status: SHIPPED | OUTPATIENT
Start: 2018-01-08 | End: 2018-01-17 | Stop reason: SDUPTHER

## 2018-01-08 NOTE — TELEPHONE ENCOUNTER
----- Message from Jessi Sanon sent at 1/8/2018  1:03 PM EST -----  Regarding: PT CALLED - SEE PENDING REFILL  Contact: [phone]  PT IS OUT OF MEDS. [facility] ONLY HAS 10 PILLS AT THIS TIME.

## 2018-01-16 ENCOUNTER — TELEPHONE (OUTPATIENT)
Dept: FAMILY MEDICINE CLINIC | Facility: CLINIC | Age: 63
End: 2018-01-16

## 2018-01-16 RX ORDER — ATORVASTATIN CALCIUM 20 MG/1
20 TABLET, FILM COATED ORAL DAILY
Qty: 90 TABLET | Refills: 1 | Status: SHIPPED | OUTPATIENT
Start: 2018-01-16 | End: 2018-05-01 | Stop reason: SDUPTHER

## 2018-01-16 NOTE — TELEPHONE ENCOUNTER
SENT RX TO PHARMACY FOR PT. HE IS AWARE.     ----- Message from Roro Schulz sent at 1/16/2018  1:04 PM EST -----  PT CALLED AND SAID HE NEEDED A REFILL ON HIS ATORVASTATIN.    PHARMACY [facility]

## 2018-01-17 ENCOUNTER — OFFICE VISIT (OUTPATIENT)
Dept: GASTROENTEROLOGY | Facility: CLINIC | Age: 63
End: 2018-01-17

## 2018-01-17 VITALS
SYSTOLIC BLOOD PRESSURE: 126 MMHG | HEIGHT: 68 IN | WEIGHT: 272.8 LBS | TEMPERATURE: 97.6 F | DIASTOLIC BLOOD PRESSURE: 86 MMHG | BODY MASS INDEX: 41.34 KG/M2

## 2018-01-17 DIAGNOSIS — Z80.0 FAMILY HISTORY OF COLON CANCER IN MOTHER: ICD-10-CM

## 2018-01-17 DIAGNOSIS — K21.9 GASTROESOPHAGEAL REFLUX DISEASE, ESOPHAGITIS PRESENCE NOT SPECIFIED: Primary | ICD-10-CM

## 2018-01-17 DIAGNOSIS — N62 IDIOPATHIC GYNECOMASTIA: ICD-10-CM

## 2018-01-17 PROCEDURE — 99213 OFFICE O/P EST LOW 20 MIN: CPT | Performed by: NURSE PRACTITIONER

## 2018-01-17 RX ORDER — PANTOPRAZOLE SODIUM 40 MG/1
40 TABLET, DELAYED RELEASE ORAL DAILY
Qty: 90 TABLET | Refills: 3 | Status: SHIPPED | OUTPATIENT
Start: 2018-01-17 | End: 2018-01-17 | Stop reason: SDUPTHER

## 2018-01-17 RX ORDER — PANTOPRAZOLE SODIUM 40 MG/1
40 TABLET, DELAYED RELEASE ORAL DAILY
Qty: 90 TABLET | Refills: 3 | Status: SHIPPED | OUTPATIENT
Start: 2018-01-17 | End: 2019-02-15 | Stop reason: SDUPTHER

## 2018-01-17 RX ORDER — SUCRALFATE 1 G/1
1 TABLET ORAL 3 TIMES DAILY
Qty: 90 TABLET | Refills: 11 | Status: SHIPPED | OUTPATIENT
Start: 2018-01-17 | End: 2019-03-20 | Stop reason: SDUPTHER

## 2018-01-17 NOTE — PROGRESS NOTES
Chief Complaint   Patient presents with   • Follow-up     medication refills    • Heartburn         HPI    Pt is being seen today for Follow-up.  He has a history of GERD consisting of heartburn and nausea.  Is currently well-controlled on pantoprazole 40 mg once daily.  He did have more breakthrough symptoms during the holiday season.  He took Carafate 3 times a day during the holidays but now is taking 1 Carafate daily.  Triggers include spicy food rich foods and caffeine.  He denies dysphagia, odynophagia, weight loss, hematochezia or melena.  He denies nausea or vomiting.  We reviewed his last EGD and colonoscopy performed in 2016 by Dr. Jacobson.  EGD with mild erythema in the stomach, medium hiatal hernia.  Discussed reasons for surgery treating hiatal hernia.  He is also concerned regarding enlarged breast particularly the left breast with tenderness that is intermittent surrounding the nipple area.  He states he is been evaluated by his PCP.  Family  history of colon cancer in his mother.  Due for repeat colonoscopy in 2021.    Past Medical History:   Diagnosis Date   • Anxiety    • Coronary artery disease    • Depression    • Diverticulosis    • Erosive gastritis    • H/O CT scan of abdomen 01/29/2014    ddd, tics, tortuosity w/aortoiliac atherosclerosis, HH, cardiac enlargement   • Heart attack    • Hepatitis     HEP C   • Hiatal hernia    • History of CT scan of abdomen    • Hyperlipidemia    • Hypertension    • Obese      Past Surgical History:   Procedure Laterality Date   • CERVICAL DISCECTOMY ANTERIOR      anterior spinal, osteophytectomy cerv interspace   • CERVICAL DISCECTOMY ANTERIOR  12/07/2015    spinal; C5-C6 and C6-C7 anterior cervical discectomy and arthrodesis; Jon Lua   • CHOLECYSTECTOMY     • COLONOSCOPY  06/10/2016    himanshu Sterling M.D.   • CORONARY ANGIOPLASTY WITH STENT PLACEMENT     • CORONARY ANGIOPLASTY WITH STENT PLACEMENT N/A     10yrs ago with Dr Ruff at [facility];  debatable at whether he had an MI that admission   • ENDOSCOPY N/A 12/28/2016    Medium sized HH, gastritis.     • GALLBLADDER SURGERY     • UPPER GASTROINTESTINAL ENDOSCOPY  02/12/2014    LA Grade A reflux esophagitis, HH, erosive gtastritis, bx       Current Outpatient Prescriptions   Medication Sig Dispense Refill   • aspirin 81 MG tablet Take by mouth.     • atorvastatin (LIPITOR) 20 MG tablet Take 1 tablet by mouth Daily. 90 tablet 1   • lisinopril (PRINIVIL,ZESTRIL) 5 MG tablet Take  by mouth. Take one tablet three days a week     • pantoprazole (PROTONIX) 40 MG EC tablet Take 1 tablet by mouth Daily. TK ONE T PO QD 90 tablet 3   • sucralfate (CARAFATE) 1 g tablet Take 1 tablet by mouth 3 (Three) Times a Day. 90 tablet 11   • HYDROcodone-homatropine (HYCODAN) 5-1.5 MG/5ML syrup Take 5 mL by mouth Every 8 (Eight) Hours As Needed for Cough. 120 mL 0     No current facility-administered medications for this visit.        PRN Meds:.    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   HENT: Negative for trouble swallowing.    Respiratory: Negative for choking.    Cardiovascular: Negative for chest pain.   Gastrointestinal: Negative for abdominal distention, abdominal pain, blood in stool, constipation, diarrhea, nausea and vomiting.        Heartburn reflux indigestion   "  Musculoskeletal: Negative for back pain.   Skin: Negative for pallor.   Neurological: Negative for dizziness.       Vitals:    01/17/18 1353   BP: 126/86   Temp: 97.6 °F (36.4 °C)     /86 (BP Location: Left arm, Patient Position: Sitting)  Temp 97.6 °F (36.4 °C) (Oral)   Ht 172.7 cm (68\")  Wt 124 kg (272 lb 12.8 oz)  BMI 41.48 kg/m2  Physical Exam   Constitutional: He is oriented to person, place, and time. He appears well-developed and well-nourished.   HENT:   Head: Normocephalic and atraumatic.   Eyes: Conjunctivae and EOM are normal.   Neck: Normal range of motion. No tracheal deviation present.   Cardiovascular: Normal rate and regular rhythm.    Pulmonary/Chest: Effort normal and breath sounds normal. No respiratory distress.   Abdominal: Soft. Bowel sounds are normal. He exhibits no distension and no mass. There is no tenderness. There is no rebound and no guarding.   Musculoskeletal: Normal range of motion.   Neurological: He is alert and oriented to person, place, and time.   Skin: Skin is warm and dry.   Psychiatric: He has a normal mood and affect. Judgment normal.   Nursing note and vitals reviewed.      ASSESSMENT AND PLAN    Antony was seen today for follow-up and heartburn.    Diagnoses and all orders for this visit:    Gastroesophageal reflux disease, esophagitis presence not specified  Comments:  Continue Protonix 40mg daily. Discussed trial of 20mg daily if GERD sx return resume 40mg/day. GERD diet guidelines reviewed and handout provided.     Family history of colon cancer in mother  Comments:  repeat cs in 2021 on recall system    Idiopathic gynecomastia  Comments:  left breast tenderness. Please f/u with PCP for further evaluation     Other orders  -     Discontinue: pantoprazole (PROTONIX) 40 MG EC tablet; Take 1 tablet by mouth Daily. TK ONE T PO QD  -     pantoprazole (PROTONIX) 40 MG EC tablet; Take 1 tablet by mouth Daily. TK ONE T PO QD  -     sucralfate (CARAFATE) 1 g tablet; " Take 1 tablet by mouth 3 (Three) Times a Day.      Weight loss, GERD diet modifications, and continue current medications  For any additional questions, concerns or changes to your condition after today's office visit please contact the office at [phone].       Jessica STEVE   [facility]  [address] [place] [address]  Office: [phone]     [id number] [facility]

## 2018-01-29 ENCOUNTER — OFFICE VISIT (OUTPATIENT)
Dept: FAMILY MEDICINE CLINIC | Facility: CLINIC | Age: 63
End: 2018-01-29

## 2018-01-29 VITALS
SYSTOLIC BLOOD PRESSURE: 110 MMHG | HEART RATE: 107 BPM | TEMPERATURE: 99 F | RESPIRATION RATE: 17 BRPM | WEIGHT: 265 LBS | OXYGEN SATURATION: 98 % | BODY MASS INDEX: 40.16 KG/M2 | DIASTOLIC BLOOD PRESSURE: 70 MMHG | HEIGHT: 68 IN

## 2018-01-29 DIAGNOSIS — A08.4 VIRAL GASTROENTERITIS: Primary | ICD-10-CM

## 2018-01-29 DIAGNOSIS — I10 ESSENTIAL HYPERTENSION: ICD-10-CM

## 2018-01-29 PROCEDURE — 99213 OFFICE O/P EST LOW 20 MIN: CPT | Performed by: INTERNAL MEDICINE

## 2018-01-29 RX ORDER — PROMETHAZINE HYDROCHLORIDE 12.5 MG/1
25 TABLET ORAL EVERY 6 HOURS PRN
Qty: 40 TABLET | Refills: 1 | Status: SHIPPED | OUTPATIENT
Start: 2018-01-29 | End: 2018-06-18 | Stop reason: ALTCHOICE

## 2018-01-29 NOTE — PROGRESS NOTES
Louise Tatum is a 62 y.o. male. Patient is here today for   Chief Complaint   Patient presents with   • Vomiting          Vitals:    01/29/18 0836   BP: 110/70   Pulse: 107   Resp: 17   Temp: 99 °F (37.2 °C)   SpO2: 98%       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     Social History Main Topics   • Smoking status: Never Smoker   • Smokeless tobacco: Never Used   • Alcohol use 1.2 oz/week     2 Shots of liquor per week      Comment: weekly   • Drug use: No   • Sexual activity: Defer     Other Topics Concern   • Not on file     Social History Narrative        Current Outpatient Prescriptions:   •  aspirin 81 MG tablet, Take by mouth., Disp: , Rfl:   •  atorvastatin (LIPITOR) 20 MG tablet, Take 1 tablet by mouth Daily., Disp: 90 tablet, Rfl: 1  •  HYDROcodone-homatropine (HYCODAN) 5-1.5 MG/5ML syrup, Take 5 mL by mouth Every 8 (Eight) Hours As Needed for Cough., Disp: 120 mL, Rfl: 0  •  lisinopril (PRINIVIL,ZESTRIL) 5 MG tablet, Take  by mouth. Take one tablet three days a week, Disp: , Rfl:   •  pantoprazole (PROTONIX) 40 MG EC tablet, Take 1 tablet by mouth Daily. TK ONE T PO QD, Disp: 90 tablet, Rfl: 3  •  promethazine (PHENERGAN) 12.5 MG tablet, Take 2 tablets by mouth Every 6 (Six) Hours As Needed for Nausea or Vomiting., Disp: 40 tablet, Rfl: 1  •  sucralfate (CARAFATE) 1 g tablet, Take 1 tablet by mouth 3 (Three) Times a Day.,  Disp: 90 tablet, Rfl: 11     Objective     HPI Comments: This patient tells me that he has had nausea with vomiting approximately on 8 occasions since 1:00 this morning.  He has a little bit of abdominal pain as well.  He feels that he may have eaten a pizza that gave him food poisoning.    He denies any fevers or chills, cough, upper respiratory tract symptoms etc.    Vomiting    Associated symptoms include abdominal pain.        Review of Systems   Constitutional: Negative.    HENT: Negative.    Respiratory: Negative.    Cardiovascular: Negative.    Gastrointestinal: Positive for abdominal pain and vomiting.       Physical Exam   Constitutional: He is oriented to person, place, and time. He appears well-developed and well-nourished. No distress.   Pleasant, neatly groomed, BMI 40.   HENT:   Head: Normocephalic and atraumatic.   Mucous membranes clear, moist.   Abdominal: Soft. Bowel sounds are normal. He exhibits no distension. There is no tenderness. There is no guarding.   Neurological: He is alert and oriented to person, place, and time.   Skin: Skin is warm and dry.   Psychiatric: He has a normal mood and affect.   Nursing note and vitals reviewed.        Problem List Items Addressed This Visit        Cardiovascular and Mediastinum    Hypertension       Digestive    Viral gastroenteritis - Primary            PLAN  He most likely has a viral gastroenteritis.  He might have food poisoning, but I think this is less likely.  In any case his symptoms should resolve on their own in the next few days.  In the meantime, I sent a prescription out for Phenergan tablets.    He will it me know if he gets worse or fails to improve.    His hypertension is well-controlled.  No Follow-up on file.

## 2018-02-02 ENCOUNTER — TELEPHONE (OUTPATIENT)
Dept: FAMILY MEDICINE CLINIC | Facility: CLINIC | Age: 63
End: 2018-02-02

## 2018-02-02 RX ORDER — ZOLPIDEM TARTRATE 10 MG/1
10 TABLET ORAL NIGHTLY PRN
Qty: 30 TABLET | Refills: 0 | Status: SHIPPED | OUTPATIENT
Start: 2018-02-02 | End: 2018-11-30 | Stop reason: SDUPTHER

## 2018-02-02 NOTE — TELEPHONE ENCOUNTER
RX IS UP FRONT AND READY TO BE PICKED UP. CALLED AND LEFT MESSAGE FOR PT THAT HE CAN COME .     ----- Message from [place] sent at 2/2/2018  2:01 PM EST -----  NEED A RX FOR AMBIEN 10 MG # 30     PLEASE CALL WHEN READY TO      [phone]

## 2018-02-09 RX ORDER — PANTOPRAZOLE SODIUM 40 MG/1
TABLET, DELAYED RELEASE ORAL
Qty: 60 TABLET | Refills: 0 | OUTPATIENT
Start: 2018-02-09

## 2018-02-22 RX ORDER — LISINOPRIL 5 MG/1
5 TABLET ORAL 3 TIMES WEEKLY
Qty: 27 TABLET | Refills: 2 | Status: SHIPPED | OUTPATIENT
Start: 2018-02-23 | End: 2018-05-17 | Stop reason: SDUPTHER

## 2018-02-22 NOTE — TELEPHONE ENCOUNTER
---REORDERED LISINOPRIL           Message from Roro Schulz sent at 2/22/2018  1:28 PM EST -----  REFILL ON LISINOPRIL 5MG 1QD QTY:30    PHARMACY [facility]    PT# [phone]

## 2018-03-22 ENCOUNTER — OFFICE VISIT (OUTPATIENT)
Dept: FAMILY MEDICINE CLINIC | Facility: CLINIC | Age: 63
End: 2018-03-22

## 2018-03-22 VITALS
WEIGHT: 268.2 LBS | HEART RATE: 75 BPM | OXYGEN SATURATION: 96 % | TEMPERATURE: 98.4 F | SYSTOLIC BLOOD PRESSURE: 130 MMHG | HEIGHT: 68 IN | DIASTOLIC BLOOD PRESSURE: 80 MMHG | BODY MASS INDEX: 40.65 KG/M2

## 2018-03-22 DIAGNOSIS — I10 ESSENTIAL HYPERTENSION: ICD-10-CM

## 2018-03-22 DIAGNOSIS — J40 BRONCHITIS: ICD-10-CM

## 2018-03-22 DIAGNOSIS — K21.9 GASTROESOPHAGEAL REFLUX DISEASE, ESOPHAGITIS PRESENCE NOT SPECIFIED: ICD-10-CM

## 2018-03-22 DIAGNOSIS — J01.40 ACUTE NON-RECURRENT PANSINUSITIS: ICD-10-CM

## 2018-03-22 DIAGNOSIS — R05.9 COUGH: Primary | ICD-10-CM

## 2018-03-22 DIAGNOSIS — I25.10 CORONARY ARTERY DISEASE INVOLVING NATIVE HEART WITHOUT ANGINA PECTORIS, UNSPECIFIED VESSEL OR LESION TYPE: ICD-10-CM

## 2018-03-22 DIAGNOSIS — J02.9 ACUTE PHARYNGITIS, UNSPECIFIED ETIOLOGY: ICD-10-CM

## 2018-03-22 LAB
EXPIRATION DATE: NORMAL
FLUAV AG NPH QL: NEGATIVE
FLUBV AG NPH QL: NEGATIVE
INTERNAL CONTROL: NORMAL
Lab: NORMAL

## 2018-03-22 PROCEDURE — 87804 INFLUENZA ASSAY W/OPTIC: CPT | Performed by: INTERNAL MEDICINE

## 2018-03-22 PROCEDURE — 99213 OFFICE O/P EST LOW 20 MIN: CPT | Performed by: INTERNAL MEDICINE

## 2018-03-22 RX ORDER — AZITHROMYCIN 250 MG/1
TABLET, FILM COATED ORAL
Qty: 6 TABLET | Refills: 0 | Status: SHIPPED | OUTPATIENT
Start: 2018-03-22 | End: 2018-04-19

## 2018-03-22 NOTE — PROGRESS NOTES
Subjective   Antony Tatum is a 62 y.o. male. Patient is here today for 6 days of sinus congestion and drainage, slight sore throat, coughing and also a flare of his GERD symptoms.  He's not had any fevers or chills.  He's not been improving.  He does have both Carafate and Protonix for his GERD.  He's had no respiratory distress  Chief Complaint   Patient presents with   • Cough     HEAD CONGESTION, DRAINAGE, RUNNY NOSE, HEADACHE- STARTED ABOUT 4 DAYS AGO          Vitals:    03/22/18 1109   BP: 130/80   Pulse: 75   Temp: 98.4 °F (36.9 °C)   SpO2: 96%     The following portions of the patient's history were reviewed and updated as appropriate: allergies, current medications, past family history, past medical history, past social history, past surgical history and problem list.    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     Social History Main Topics   • Smoking status: Never Smoker   • Smokeless tobacco: Never Used   • Alcohol use 1.2 oz/week     2 Shots of liquor per week      Comment: weekly   • Drug use: No   • Sexual activity: Defer     Other Topics Concern   • Not on file     Social History Narrative   • No narrative on file        Current Outpatient Prescriptions:   •  aspirin 81 MG tablet, Take by mouth., Disp: , Rfl:   •  atorvastatin (LIPITOR) 20 MG tablet, Take 1 tablet by mouth Daily., Disp: 90 tablet, Rfl: 1  •   HYDROcodone-homatropine (HYCODAN) 5-1.5 MG/5ML syrup, Take 5 mL by mouth Every 8 (Eight) Hours As Needed for Cough., Disp: 120 mL, Rfl: 0  •  lisinopril (PRINIVIL,ZESTRIL) 5 MG tablet, Take 1 tablet by mouth 3 (Three) Times a Week. Take one tablet three days a week, Disp: 27 tablet, Rfl: 2  •  pantoprazole (PROTONIX) 40 MG EC tablet, Take 1 tablet by mouth Daily. TK ONE T PO QD, Disp: 90 tablet, Rfl: 3  •  promethazine (PHENERGAN) 12.5 MG tablet, Take 2 tablets by mouth Every 6 (Six) Hours As Needed for Nausea or Vomiting., Disp: 40 tablet, Rfl: 1  •  sucralfate (CARAFATE) 1 g tablet, Take 1 tablet by mouth 3 (Three) Times a Day., Disp: 90 tablet, Rfl: 11  •  zolpidem (AMBIEN) 10 MG tablet, Take 1 tablet by mouth At Night As Needed for Sleep., Disp: 30 tablet, Rfl: 0     Objective     History of Present Illness     Review of Systems   Constitutional: Negative for chills and fever.   HENT: Positive for congestion, postnasal drip, rhinorrhea, sinus pressure and sore throat.    Eyes: Negative.    Respiratory: Positive for cough. Negative for shortness of breath.    Cardiovascular: Negative.    Gastrointestinal: Positive for abdominal pain.   Endocrine: Negative.    Genitourinary: Negative.    Musculoskeletal: Negative.    Skin: Negative.    Neurological: Negative.    Psychiatric/Behavioral: Negative.        Physical Exam   Constitutional: He is oriented to person, place, and time. He appears well-developed and well-nourished.   Pleasant, cooperative no distress but feeling acutely ill   HENT:   Head: Normocephalic and atraumatic.   Nose: Right sinus exhibits maxillary sinus tenderness and frontal sinus tenderness. Left sinus exhibits maxillary sinus tenderness and frontal sinus tenderness.   Mouth/Throat: Uvula is midline, oropharynx is clear and moist and mucous membranes are normal. No tonsillar exudate.   Eyes: Conjunctivae are normal. Pupils are equal, round, and reactive to light. No scleral icterus.   Neck:  Normal range of motion. Neck supple.   Cardiovascular: Normal rate, regular rhythm and normal heart sounds.    Pulmonary/Chest: Effort normal and breath sounds normal. No respiratory distress. He has no wheezes. He has no rales.   Abdominal: Soft. Bowel sounds are normal.   Some epigastric tenderness to palpation   Musculoskeletal: Normal range of motion.   Lymphadenopathy:     He has no cervical adenopathy.   Neurological: He is alert and oriented to person, place, and time.   Skin: Skin is warm and dry.   Psychiatric: He has a normal mood and affect. His behavior is normal.   Nursing note and vitals reviewed.      ASSESSMENT  influenza testing was negative.  Patient's having upper respiratory symptoms with sinusitis and some bronchitis but nothing to suggest a pneumonia.  He's also having a flare of his GERD symptoms, possibly related to the coughing.  He's not been improving over 6 days.     Problem List Items Addressed This Visit        Cardiovascular and Mediastinum    Hypertension    Coronary artery disease       Digestive    GERD (gastroesophageal reflux disease)      Other Visit Diagnoses     Cough    -  Primary    Relevant Orders    POC Influenza A / B    Acute pharyngitis, unspecified etiology        Bronchitis        Acute non-recurrent pansinusitis              PLAN  The patient can use the Protonix twice a day and the Carafate 4 times a day.  I'm going to start him on a Z-Akhil which she is had in the past.  He can continue with other symptomatic medicines over-the-counter.    There are no Patient Instructions on file for this visit.  No Follow-up on file.

## 2018-04-05 DIAGNOSIS — E78.5 HYPERLIPIDEMIA, UNSPECIFIED HYPERLIPIDEMIA TYPE: Primary | ICD-10-CM

## 2018-04-12 LAB
ALBUMIN SERPL-MCNC: 4.5 G/DL (ref 3.5–5.2)
ALBUMIN/GLOB SERPL: 1.8 G/DL
ALP SERPL-CCNC: 109 U/L (ref 39–117)
ALT SERPL-CCNC: 28 U/L (ref 1–41)
AST SERPL-CCNC: 20 U/L (ref 1–40)
BILIRUB SERPL-MCNC: 0.7 MG/DL (ref 0.1–1.2)
BUN SERPL-MCNC: 19 MG/DL (ref 8–23)
BUN/CREAT SERPL: 25.3 (ref 7–25)
CALCIUM SERPL-MCNC: 9.2 MG/DL (ref 8.6–10.5)
CHLORIDE SERPL-SCNC: 99 MMOL/L (ref 98–107)
CHOLEST SERPL-MCNC: 156 MG/DL (ref 0–200)
CO2 SERPL-SCNC: 26.8 MMOL/L (ref 22–29)
CREAT SERPL-MCNC: 0.75 MG/DL (ref 0.76–1.27)
GFR SERPLBLD CREATININE-BSD FMLA CKD-EPI: 105 ML/MIN/1.73
GFR SERPLBLD CREATININE-BSD FMLA CKD-EPI: 127 ML/MIN/1.73
GLOBULIN SER CALC-MCNC: 2.5 GM/DL
GLUCOSE SERPL-MCNC: 99 MG/DL (ref 65–99)
HDLC SERPL-MCNC: 44 MG/DL (ref 40–60)
LDLC SERPL CALC-MCNC: 85 MG/DL (ref 0–100)
LDLC/HDLC SERPL: 1.94 {RATIO}
POTASSIUM SERPL-SCNC: 4.7 MMOL/L (ref 3.5–5.2)
PROT SERPL-MCNC: 7 G/DL (ref 6–8.5)
SODIUM SERPL-SCNC: 141 MMOL/L (ref 136–145)
TRIGL SERPL-MCNC: 134 MG/DL (ref 0–150)
VLDLC SERPL CALC-MCNC: 26.8 MG/DL (ref 5–40)

## 2018-04-19 ENCOUNTER — TELEPHONE (OUTPATIENT)
Dept: FAMILY MEDICINE CLINIC | Facility: CLINIC | Age: 63
End: 2018-04-19

## 2018-04-19 ENCOUNTER — OFFICE VISIT (OUTPATIENT)
Dept: FAMILY MEDICINE CLINIC | Facility: CLINIC | Age: 63
End: 2018-04-19

## 2018-04-19 VITALS
DIASTOLIC BLOOD PRESSURE: 82 MMHG | SYSTOLIC BLOOD PRESSURE: 130 MMHG | TEMPERATURE: 97.8 F | WEIGHT: 266.4 LBS | HEART RATE: 67 BPM | BODY MASS INDEX: 40.38 KG/M2 | HEIGHT: 68 IN | OXYGEN SATURATION: 93 %

## 2018-04-19 DIAGNOSIS — K21.9 GASTROESOPHAGEAL REFLUX DISEASE, ESOPHAGITIS PRESENCE NOT SPECIFIED: ICD-10-CM

## 2018-04-19 DIAGNOSIS — E29.1 TESTOSTERONE DEFICIENCY IN MALE: ICD-10-CM

## 2018-04-19 DIAGNOSIS — L30.9 DERMATITIS: ICD-10-CM

## 2018-04-19 DIAGNOSIS — N64.4 BREAST TENDERNESS IN MALE: Primary | ICD-10-CM

## 2018-04-19 DIAGNOSIS — I25.10 CORONARY ARTERY DISEASE INVOLVING NATIVE HEART WITHOUT ANGINA PECTORIS, UNSPECIFIED VESSEL OR LESION TYPE: ICD-10-CM

## 2018-04-19 DIAGNOSIS — E78.5 HYPERLIPIDEMIA, UNSPECIFIED HYPERLIPIDEMIA TYPE: ICD-10-CM

## 2018-04-19 DIAGNOSIS — I10 ESSENTIAL HYPERTENSION: ICD-10-CM

## 2018-04-19 DIAGNOSIS — IMO0001 CLASS 3 OBESITY DUE TO EXCESS CALORIES WITHOUT SERIOUS COMORBIDITY WITH BODY MASS INDEX (BMI) OF 40.0 TO 44.9 IN ADULT: ICD-10-CM

## 2018-04-19 PROCEDURE — 99214 OFFICE O/P EST MOD 30 MIN: CPT | Performed by: INTERNAL MEDICINE

## 2018-04-19 NOTE — PROGRESS NOTES
Louise Tatum is a 62 y.o. male. Patient is here today for follow-up on his hypertension, hyperlipidemia, history of coronary artery disease, GERD,.  He is generally feeling okay and is had no chest pain.  He would like to get with the cardiologist and [facility].  He does continue to feel that left breast is larger than the right and intermittently gets somewhat tender.  He has not felt any mass.  He also has a dermatitis for a number of months in his right lateral lower leg ankle area.  Chief Complaint   Patient presents with   • Hyperlipidemia     HTN, GERD- FOLLOW UP LABS AND MEDS          Vitals:    04/19/18 0808   BP: 130/82   Pulse: 67   Temp: 97.8 °F (36.6 °C)   SpO2: 93%     The following portions of the patient's history were reviewed and updated as appropriate: allergies, current medications, past family history, past medical history, past social history, past surgical history and problem list.    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     Social History Main Topics   • Smoking status: Never Smoker   • Smokeless tobacco: Never Used   • Alcohol use 1.2 oz/week     2 Shots of liquor per week      Comment: weekly   • Drug use: No   • Sexual activity: Defer     Other Topics Concern   • Not on file     Social History Narrative   • No narrative on file        Current Outpatient Prescriptions:   •  aspirin 81 MG tablet,  Take by mouth., Disp: , Rfl:   •  atorvastatin (LIPITOR) 20 MG tablet, Take 1 tablet by mouth Daily., Disp: 90 tablet, Rfl: 1  •  lisinopril (PRINIVIL,ZESTRIL) 5 MG tablet, Take 1 tablet by mouth 3 (Three) Times a Week. Take one tablet three days a week, Disp: 27 tablet, Rfl: 2  •  pantoprazole (PROTONIX) 40 MG EC tablet, Take 1 tablet by mouth Daily. TK ONE T PO QD, Disp: 90 tablet, Rfl: 3  •  promethazine (PHENERGAN) 12.5 MG tablet, Take 2 tablets by mouth Every 6 (Six) Hours As Needed for Nausea or Vomiting., Disp: 40 tablet, Rfl: 1  •  sucralfate (CARAFATE) 1 g tablet, Take 1 tablet by mouth 3 (Three) Times a Day., Disp: 90 tablet, Rfl: 11  •  zolpidem (AMBIEN) 10 MG tablet, Take 1 tablet by mouth At Night As Needed for Sleep., Disp: 30 tablet, Rfl: 0     Objective     History of Present Illness     Review of Systems   Constitutional: Negative.    HENT: Negative.    Eyes: Negative.    Respiratory: Negative.    Cardiovascular: Negative.    Gastrointestinal: Negative.    Endocrine: Negative.    Genitourinary: Negative.    Musculoskeletal: Negative.    Skin: Negative.    Neurological: Negative.    Psychiatric/Behavioral: Negative.        Physical Exam   Constitutional: He is oriented to person, place, and time. He appears well-developed and well-nourished.   Pleasant, cooperative no distress, obese, blood pressure 130/80   HENT:   Head: Normocephalic and atraumatic.   Eyes: EOM are normal. Pupils are equal, round, and reactive to light. No scleral icterus.   Neck: Normal range of motion. Neck supple.   Cardiovascular: Normal rate, regular rhythm and normal heart sounds.    No murmur heard.  Pulmonary/Chest: Effort normal and breath sounds normal. No respiratory distress. He has no wheezes. He has no rales.   Both breasts have some enlargement related to age but the left may be minimally larger.  I can feel no significant breast tissue or masses.   Musculoskeletal: Normal range of motion. He exhibits no edema.    Neurological: He is alert and oriented to person, place, and time.   Skin: Skin is warm and dry.   There is a nonspecific type rash in the right lower leg ankle area consistent with an eczema.     Psychiatric: He has a normal mood and affect. His behavior is normal.   Nursing note and vitals reviewed.      ASSESSMENT  CMP was essentially normal.  Lipid panel is stable and controlled with a total cholesterol 156, HDL 44, LDL 85 and triglycerides 134.  #1-hypertension, adequately controlled  #2-hyperlipidemia, generally good control  #3-intermittent left breast tenderness with borderline enlargement but no definite mass  #4-GERD, controlled  #5-history of coronary artery disease, asymptomatic  #6-dermatitis on the right leg, probably some eczema     Problem List Items Addressed This Visit        Cardiovascular and Mediastinum    Hyperlipidemia    Hypertension    Coronary artery disease    Relevant Orders    Ambulatory Referral to Cardiology       Digestive    Adiposity    GERD (gastroesophageal reflux disease)       Endocrine    Testosterone deficiency in male    Relevant Orders    Testosterone, Free, Total       Nervous and Auditory    Breast tenderness in male - Primary    Relevant Orders    Testosterone, Free, Total    Mammo diagnostic left w CAD       Musculoskeletal and Integument    Dermatitis      Other Visit Diagnoses    None.         PLAN  I've ordered a mammogram on the left breast and testosterone levels.  I referred the patient to cardiology at [facility] for further care.  I want to recheck patient back in about a week or so to review the results.    There are no Patient Instructions on file for this visit.  Return in about 1 week (around 4/26/2018).

## 2018-04-21 LAB
TESTOST FREE SERPL-MCNC: 6.1 PG/ML (ref 6.6–18.1)
TESTOST SERPL-MCNC: 196 NG/DL (ref 264–916)

## 2018-04-26 ENCOUNTER — HOSPITAL ENCOUNTER (OUTPATIENT)
Dept: MAMMOGRAPHY | Facility: HOSPITAL | Age: 63
Discharge: HOME | End: 2018-04-26
Admitting: INTERNAL MEDICINE

## 2018-04-26 DIAGNOSIS — E29.1 TESTOSTERONE DEFICIENCY IN MALE: ICD-10-CM

## 2018-04-26 DIAGNOSIS — N64.4 BREAST TENDERNESS IN MALE: ICD-10-CM

## 2018-04-26 PROCEDURE — 77066 DX MAMMO INCL CAD BI: CPT

## 2018-05-01 ENCOUNTER — OFFICE VISIT (OUTPATIENT)
Dept: FAMILY MEDICINE CLINIC | Facility: CLINIC | Age: 63
End: 2018-05-01

## 2018-05-01 VITALS
SYSTOLIC BLOOD PRESSURE: 130 MMHG | BODY MASS INDEX: 40.13 KG/M2 | WEIGHT: 264.8 LBS | HEIGHT: 68 IN | TEMPERATURE: 98 F | HEART RATE: 62 BPM | DIASTOLIC BLOOD PRESSURE: 80 MMHG | OXYGEN SATURATION: 96 %

## 2018-05-01 DIAGNOSIS — N64.4 BREAST TENDERNESS IN MALE: ICD-10-CM

## 2018-05-01 DIAGNOSIS — E29.1 TESTOSTERONE DEFICIENCY IN MALE: ICD-10-CM

## 2018-05-01 DIAGNOSIS — I10 ESSENTIAL HYPERTENSION: ICD-10-CM

## 2018-05-01 DIAGNOSIS — I25.10 CORONARY ARTERY DISEASE INVOLVING NATIVE HEART WITHOUT ANGINA PECTORIS, UNSPECIFIED VESSEL OR LESION TYPE: Primary | ICD-10-CM

## 2018-05-01 DIAGNOSIS — E78.5 HYPERLIPIDEMIA, UNSPECIFIED HYPERLIPIDEMIA TYPE: ICD-10-CM

## 2018-05-01 PROCEDURE — 99213 OFFICE O/P EST LOW 20 MIN: CPT | Performed by: INTERNAL MEDICINE

## 2018-05-01 RX ORDER — ATORVASTATIN CALCIUM 20 MG/1
20 TABLET, FILM COATED ORAL DAILY
Qty: 90 TABLET | Refills: 3 | Status: SHIPPED | OUTPATIENT
Start: 2018-05-01 | End: 2019-10-03 | Stop reason: SDUPTHER

## 2018-05-01 NOTE — PROGRESS NOTES
Subjective   Antony Tatum is a 62 y.o. male. Patient is here today for follow-up on his hypertension, hyperlipidemia, coronary artery disease, testosterone deficiency her left breast tenderness.  He's generally been stable and is had no chest pain, shortness of breath, edema or myalgias.  He tells me he is only taking the atorvastatin about 3 times a week.  He continues with left breast tenderness   Chief Complaint   Patient presents with   • LEFT BREAST TENDERNESS     GO OVER MAMMO AND LAB RESULTS           Vitals:    05/01/18 0817   BP: 130/80   Pulse: 62   Temp: 98 °F (36.7 °C)   SpO2: 96%     The following portions of the patient's history were reviewed and updated as appropriate: allergies, current medications, past family history, past medical history, past social history, past surgical history and problem list.    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     Social History Main Topics   • Smoking status: Never Smoker   • Smokeless tobacco: Never Used   • Alcohol use 1.2 oz/week     2 Shots of liquor per week      Comment: weekly   • Drug use: No   • Sexual activity: Defer     Other Topics Concern   • Not on file     Social History Narrative   • No narrative on file        Current Outpatient Prescriptions:   •  aspirin 81 MG tablet, Take by mouth., Disp: , Rfl:   •  atorvastatin (LIPITOR) 20 MG tablet, Take 1 tablet by mouth Daily., Disp:  90 tablet, Rfl: 3  •  lisinopril (PRINIVIL,ZESTRIL) 5 MG tablet, Take 1 tablet by mouth 3 (Three) Times a Week. Take one tablet three days a week, Disp: 27 tablet, Rfl: 2  •  pantoprazole (PROTONIX) 40 MG EC tablet, Take 1 tablet by mouth Daily. TK ONE T PO QD, Disp: 90 tablet, Rfl: 3  •  promethazine (PHENERGAN) 12.5 MG tablet, Take 2 tablets by mouth Every 6 (Six) Hours As Needed for Nausea or Vomiting., Disp: 40 tablet, Rfl: 1  •  sucralfate (CARAFATE) 1 g tablet, Take 1 tablet by mouth 3 (Three) Times a Day., Disp: 90 tablet, Rfl: 11  •  zolpidem (AMBIEN) 10 MG tablet, Take 1 tablet by mouth At Night As Needed for Sleep., Disp: 30 tablet, Rfl: 0     Objective     History of Present Illness     Review of Systems   Constitutional: Negative.    HENT: Negative.    Eyes: Negative.    Respiratory: Negative.    Cardiovascular: Negative.    Gastrointestinal: Negative.    Genitourinary: Negative.    Musculoskeletal: Negative.    Skin: Negative.    Neurological: Negative.    Psychiatric/Behavioral: Negative.        Physical Exam   Constitutional: He is oriented to person, place, and time. He appears well-developed and well-nourished.   Pleasant, cooperative no distress blood pressure 120/80   HENT:   Head: Normocephalic and atraumatic.   Eyes: Conjunctivae are normal. Pupils are equal, round, and reactive to light. No scleral icterus.   Neck: Normal range of motion. Neck supple.   Cardiovascular: Normal rate, regular rhythm and normal heart sounds.    Pulmonary/Chest: Effort normal and breath sounds normal. No respiratory distress. He has no wheezes. He has no rales.   Musculoskeletal: Normal range of motion. He exhibits no edema.   Neurological: He is alert and oriented to person, place, and time.   Skin: Skin is warm and dry.   Psychiatric: He has a normal mood and affect. His behavior is normal.   Nursing note and vitals reviewed.      ASSESSMENT  testosterone total and free are stable but minimally low.  Mammogram  showed no signs of malignancy but gynecomastia both breasts, left greater than right.  #1-hypertension, adequately controlled  #2-hyperlipidemia, not optimally controlled  #3-history coronary artery disease, asymptomatic  #4-gynecomastia especially the left but the patient is not interested in any further procedures  #5-mild testosterone deficiency, asymptomatic     Problem List Items Addressed This Visit        Cardiovascular and Mediastinum    Hyperlipidemia    Relevant Medications    atorvastatin (LIPITOR) 20 MG tablet    Hypertension    Coronary artery disease - Primary       Endocrine    Testosterone deficiency in male       Nervous and Auditory    Breast tenderness in male      Other Visit Diagnoses    None.         PLAN  I recommended exercise and weight loss for the patient's gynecomastia.  He is not interested in seeing the surgeons.  He also does not want to go on any testosterone medication.  I encouraged him to take his atorvastatin daily because his lipids should be lower.  Patient somewhat resistant to all of these.  I plan on rechecking him in 6 months with a CBC, CMP, lipid panel, PSA, TSH       There are no Patient Instructions on file for this visit.  Return in about 4 months (around 9/1/2018) for with labs.

## 2018-05-17 RX ORDER — LISINOPRIL 5 MG/1
5 TABLET ORAL 3 TIMES WEEKLY
Qty: 27 TABLET | Refills: 2 | Status: SHIPPED | OUTPATIENT
Start: 2018-05-18 | End: 2018-10-02 | Stop reason: SDUPTHER

## 2018-05-17 NOTE — TELEPHONE ENCOUNTER
SENT THE PT'S LISINOPRIL IN TO [facility] PT WANTED A PILL FOR EVERYDAY,BUT LOOKED IN DR. MAYER NOTE AND IT DID NOT STATE A CHANGE

## 2018-06-18 ENCOUNTER — OFFICE VISIT (OUTPATIENT)
Dept: CARDIOLOGY | Facility: CLINIC | Age: 63
End: 2018-06-18

## 2018-06-18 ENCOUNTER — TELEPHONE (OUTPATIENT)
Dept: CARDIOLOGY | Facility: CLINIC | Age: 63
End: 2018-06-18

## 2018-06-18 VITALS
DIASTOLIC BLOOD PRESSURE: 82 MMHG | WEIGHT: 264 LBS | HEIGHT: 68 IN | BODY MASS INDEX: 40.01 KG/M2 | HEART RATE: 61 BPM | SYSTOLIC BLOOD PRESSURE: 132 MMHG

## 2018-06-18 DIAGNOSIS — I25.10 CORONARY ARTERY DISEASE INVOLVING NATIVE CORONARY ARTERY OF NATIVE HEART WITHOUT ANGINA PECTORIS: Primary | ICD-10-CM

## 2018-06-18 DIAGNOSIS — I10 ESSENTIAL HYPERTENSION: ICD-10-CM

## 2018-06-18 DIAGNOSIS — I71.20 THORACIC AORTIC ANEURYSM WITHOUT RUPTURE (HCC): ICD-10-CM

## 2018-06-18 PROCEDURE — 99204 OFFICE O/P NEW MOD 45 MIN: CPT | Performed by: INTERNAL MEDICINE

## 2018-06-18 PROCEDURE — 93000 ELECTROCARDIOGRAM COMPLETE: CPT | Performed by: INTERNAL MEDICINE

## 2018-06-18 NOTE — PROGRESS NOTES
Date of Office Visit: 2018  Encounter Provider: Karina Black MD  Place of Service: [facility]  Patient Name: Antony Tatum  :1955    Chief complaint  Consult  requested by Dr. Justice for evaluation of coronary artery disease    History of Present Illness  Patient is a 62-year-old gentleman with history of hypertension, hyperlipidemia, hepatitis C according to prior records had cardiac catheterization in  with chest that showed inferolateral wall ischemia.  This showed the vessel disease involving the distal circumflex but medical therapy was pursued.  He also had mild plaque in the LAD.  There was a 50-60% mid right RCA stenosis.  By  for additional records that indicate he underwent cardiac catheterization that showed 40% eccentric plaque to the LAD at the mid vessel.  Circumflex artery had a 99% left PDA occlusion with collateralization being provided to the right coronary system which was 100% occluded at the mid-level.  For basal hypokinesis with an ejection fraction of 50% with mild mitral regurgitation.  He underwent a drug-coated stent placement.  And aspirin therapy was recommended.  Patient recalls taking dual platelet therapy for some period of time it was subsequently discontinued.  He actually does not believe he had 2 cardiac catheter nor the timing of this though records aren't clearly available and were reviewed.  In 2017 he had symptoms of chest discomfort.  He had a treadmill exercise stress test that was negative for ischemia.  An echocardiogram showed normal systolic function with mild left ventricular hypertrophy and aortic valve sclerosis with trivial aortic regurgitation.  There was no stenosis.  On further review of the images is a ascending aorta also measured 3.9 cm and to my review of the images it is unclear if this is truly a tricuspid valve versus more likely a bicuspid valve with sclerosis.    In the interim patient  states this chest pain and noted a year ago has resolved.  He is active doing yard work that does not exercise.  He denies any chest pain, shortness of breath, palpitations, syncope near syncope.  Lipids checked in April showed an LDL of 85, HDL 44 triglycerides of 134.  Glucose is normal.    Past Medical History:   Diagnosis Date   • Anxiety    • Coronary artery disease    • Depression    • Diverticulosis    • Erosive gastritis    • H/O CT scan of abdomen 01/29/2014    ddd, tics, tortuosity w/aortoiliac atherosclerosis, HH, cardiac enlargement   • Heart attack    • Hepatitis     HEP C   • Hiatal hernia    • History of CT scan of abdomen    • Hyperlipidemia    • Hypertension    • Obese      Past Surgical History:   Procedure Laterality Date   • CERVICAL DISCECTOMY ANTERIOR      anterior spinal, osteophytectomy cerv interspace   • CERVICAL DISCECTOMY ANTERIOR  12/07/2015    spinal; C5-C6 and C6-C7 anterior cervical discectomy and arthrodesis; Jon Lua   • CHOLECYSTECTOMY     • COLONOSCOPY  06/10/2016    himanshu Sterling M.D.   • CORONARY ANGIOPLASTY WITH STENT PLACEMENT     • CORONARY ANGIOPLASTY WITH STENT PLACEMENT N/A     10yrs ago with Dr Ruff at [facility]; debatable at whether he had an MI that admission   • ENDOSCOPY N/A 12/28/2016    Medium sized HH, gastritis.     • GALLBLADDER SURGERY     • UPPER GASTROINTESTINAL ENDOSCOPY  02/12/2014    LA Grade A reflux esophagitis, HH, erosive gtastritis, bx     Outpatient Medications Prior to Visit   Medication Sig Dispense Refill   • aspirin 81 MG tablet Take by mouth.     • atorvastatin (LIPITOR) 20 MG tablet Take 1 tablet by mouth Daily. 90 tablet 3   • lisinopril (PRINIVIL,ZESTRIL) 5 MG tablet Take 1 tablet by mouth 3 (Three) Times a Week. Take one tablet three days a week 27 tablet 2   • pantoprazole (PROTONIX) 40 MG EC tablet Take 1 tablet by mouth Daily. TK ONE T PO QD 90 tablet 3   • sucralfate (CARAFATE) 1 g tablet Take 1 tablet by mouth 3 (Three) Times  a Day. 90 tablet 11   • zolpidem (AMBIEN) 10 MG tablet Take 1 tablet by mouth At Night As Needed for Sleep. 30 tablet 0   • promethazine (PHENERGAN) 12.5 MG tablet Take 2 tablets by mouth Every 6 (Six) Hours As Needed for Nausea or Vomiting. 40 tablet 1     No facility-administered medications prior to visit.        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   HENT: Negative for ear pain, hearing loss, nosebleeds and sore throat.    Eyes: Negative for double vision, pain, vision loss in left eye and vision loss in right eye.   Cardiovascular:        See history of present illness.   Respiratory: Negative for cough, shortness of breath, sleep disturbances due to breathing, snoring and wheezing.    Endocrine: Negative for cold intolerance, heat intolerance and polyuria.   Skin: Negative for itching, poor wound healing and rash.   Musculoskeletal: Negative for joint pain, joint swelling and myalgias.   Gastrointestinal: Negative for abdominal pain, diarrhea, hematochezia, nausea and vomiting.   Genitourinary: Negative for hematuria and hesitancy.   Neurological: Negative for numbness, paresthesias and seizures.   Psychiatric/Behavioral: Negative for  "depression. The patient is not nervous/anxious.         Objective:     Vitals:    06/18/18 0806 06/18/18 0808   BP: 128/78 132/82   BP Location: Left arm Right arm   Pulse: 61    Weight: 120 kg (264 lb)    Height: 172.7 cm (68\")      Body mass index is 40.14 kg/m².    Physical Exam   Constitutional: He is oriented to person, place, and time. He appears well-developed and well-nourished.   Obese   HENT:   Head: Normocephalic.   Nose: Nose normal.   Mouth/Throat: Oropharynx is clear and moist.   Eyes: Conjunctivae and EOM are normal. Pupils are equal, round, and reactive to light. Right eye exhibits no discharge. No scleral icterus.   Neck: Normal range of motion. Neck supple. No JVD present. No thyromegaly present.   Cardiovascular: Normal rate, regular rhythm, normal heart sounds and intact distal pulses.  Exam reveals no gallop and no friction rub.    No murmur heard.  Pulses:       Carotid pulses are 2+ on the right side, and 2+ on the left side.       Radial pulses are 2+ on the right side, and 2+ on the left side.        Femoral pulses are 0 on the right side, and 0 on the left side.       Popliteal pulses are 2+ on the left side.        Dorsalis pedis pulses are 2+ on the right side, and 2+ on the left side.        Posterior tibial pulses are 2+ on the right side, and 2+ on the left side.   Pulmonary/Chest: Effort normal and breath sounds normal. No respiratory distress. He has no wheezes. He has no rales.   Abdominal: Soft. Bowel sounds are normal. He exhibits no distension. There is no hepatosplenomegaly. There is no tenderness. There is no rebound.   Musculoskeletal: Normal range of motion. He exhibits no edema or tenderness.   Neurological: He is alert and oriented to person, place, and time.   Skin: Skin is warm and dry. No rash noted. No erythema.   Psychiatric: He has a normal mood and affect. His behavior is normal. Judgment and thought content normal.   Vitals reviewed.    Lab Review:     ECG 12 " Lead  Date/Time: 6/18/2018 8:14 AM  Performed by: SHAHBAZ MOFFETT  Authorized by: SHAHBAZ MOFFETT   Comparison: compared with previous ECG   Similar to previous ECG  Rhythm: sinus rhythm  Conduction: 1st degree and non-specific intraventricular conduction delay  Other findings: PRWP  Clinical impression: abnormal ECG          Assessment:       Diagnosis Plan   1. Coronary artery disease involving native coronary artery of native heart without angina pectoris  ECG 12 Lead   2. Essential hypertension     3. Thoracic aortic aneurysm without rupture       Plan:       1.  Coronary artery disease with negative stress test a year ago.  Clinically stable without any anginal symptoms.  Recommended he start a regular exercise regimen and pursue this factor modification  2.  Ascending aortic aneurysm.  This was noted on the echocardiogram last year.  I recommended he have a CT angiogram of his chest to assess further for diagnostic and prognostic purposes.  I reviewed the natural history of aortic aneurysmal disease, the need for adequate blood pressure control and follow-up.  Despite a 30 minute face-to-face conversation, patient refused to have CT angiography at this time.  I did also discuss with him that the echocardiogram has limited imaging options and certainly does not view the aortic arch or descending thoracic aorta which is better imaged by CT imaging and therefore I cannot determine if aneurysmal disease involves these areas.  also reviewed with him that he will need annual assessment for aortic aneurysmal disease if CT imaging confirms presence of aortic aneurysm. I also recommended a vascular screening study which would also image his carotids and abdominal aorta given the presence of coronary artery disease and thoracic aortic aneurysmal disease.  He refused this as well. I did discuss with him that carotid artery disease and abdominal aortic aneurysmal disease are more prevalent in people with coronary artery disease  and thoracic aneurysmal disease.   3. Hypertension.  I recommended a goal systolic pressure between 110-130 mmHg and diastolic pressures between 50-70 mmHg.  I recommended he purchase a large cuff arm home monitor and check this both at home and at work and call with additional readings in 2 weeks.  If it is elevated would add low-dose beta blocker therapy  4.  Dyslipidemia.  Recommended a goal LDL less than 70, HDL greater than 40, triglycerides less than 150.  I suspect he will achieve these levels if he start on a regular exercise regimen which were have also outlined for him  5.  Hepatitis C, untreated  6.  Gynecomastia, deferred for the treatment options which have been discussed by Dr. Justice.  Negative mammogram in the past    Coronary Artery Disease  Assessment  • The patient has no angina    Plan  • Lifestyle modifications discussed include adhering to a heart healthy diet, regular exercise and regular monitoring of cholesterol and blood pressure    Subjective - Objective  • There has been a previous stent procedure using LEA  • Current antiplatelet therapy includes aspirin 81 mg         Discharge Medications          Accurate as of 6/18/18 11:59 PM. If you have any questions, ask your nurse or doctor.               Continue These Medications      Instructions Start Date   aspirin 81 MG tablet   Oral      atorvastatin 20 MG tablet  Commonly known as:  LIPITOR   20 mg, Oral, Daily      lisinopril 5 MG tablet  Commonly known as:  PRINIVIL,ZESTRIL   5 mg, Oral, 3 Times Weekly, Take one tablet three days a week      pantoprazole 40 MG EC tablet  Commonly known as:  PROTONIX   40 mg, Oral, Daily, TK ONE T PO QD      sucralfate 1 g tablet  Commonly known as:  CARAFATE   1 g, Oral, 3 Times Daily      zolpidem 10 MG tablet  Commonly known as:  AMBIEN   10 mg, Oral, Nightly PRN         Stop These Medications    promethazine 12.5 MG tablet  Commonly known as:  PHENERGAN  Stopped by:  Karina Black MD            No  orders of the defined types were placed in this encounter.      Dictated utilizing Dragon dictation

## 2018-06-20 PROBLEM — I71.20 THORACIC AORTIC ANEURYSM WITHOUT RUPTURE (HCC): Status: ACTIVE | Noted: 2018-06-20

## 2018-07-23 ENCOUNTER — TELEPHONE (OUTPATIENT)
Dept: CARDIOLOGY | Facility: CLINIC | Age: 63
End: 2018-07-23

## 2018-07-23 NOTE — TELEPHONE ENCOUNTER
Pt called re: decided he wants to go ahead and have Ct Angiogram Chest.  Pt asked what Dr Black was looking for.  Per office note- this was discussed in full detail but to assess aneurysm.  Pt asked if he needs to bring anyone and if the was outpt?  Does not have to bring anyone and this is done in outpt radiology department.  Pt would like to go ahead and have this done.  Do you need to talk to Dr Justice still?

## 2018-07-25 DIAGNOSIS — I71.20 THORACIC AORTIC ANEURYSM WITHOUT RUPTURE (HCC): Primary | ICD-10-CM

## 2018-07-26 NOTE — TELEPHONE ENCOUNTER
He does not need to bring anyone unless he just wants to. There is no sedation. IV is placed and contrast given and the CT pictures taken. Order has been placed and no need to talk to Dr Justice at this point. saadia

## 2018-07-27 NOTE — TELEPHONE ENCOUNTER
Pt called for an update on when this will be scheduled.  He stated to scheduling that he has been waiting for a week now to get scheduled.  Advised him that he just decided to do the CT on Monday, Dr Black placed order on Wednesday, and our Precert department is verifying his insurance.  As soon as this is done we will call to let him know.  Ok with pt.  He said his wife was pressuring him to get this done.  Reminded him that we had explained that we wanted this in June and were awaiting his decision.  I would be happy to explain to his wife if he wanted.  No per pt.  He will wait.  (done)

## 2018-08-01 ENCOUNTER — TELEPHONE (OUTPATIENT)
Dept: FAMILY MEDICINE CLINIC | Facility: CLINIC | Age: 63
End: 2018-08-01

## 2018-08-01 ENCOUNTER — HOSPITAL ENCOUNTER (OUTPATIENT)
Dept: CT IMAGING | Facility: HOSPITAL | Age: 63
Discharge: HOME | End: 2018-08-01
Attending: INTERNAL MEDICINE | Admitting: INTERNAL MEDICINE

## 2018-08-01 LAB — CREAT BLDA-MCNC: 0.8 MG/DL (ref 0.6–1.3)

## 2018-08-01 PROCEDURE — 82565 ASSAY OF CREATININE: CPT

## 2018-08-01 PROCEDURE — 0 IOPAMIDOL PER 1 ML: Performed by: INTERNAL MEDICINE

## 2018-08-01 PROCEDURE — 71275 CT ANGIOGRAPHY CHEST: CPT

## 2018-08-01 RX ADMIN — IOPAMIDOL 95 ML: 755 INJECTION, SOLUTION INTRAVENOUS at 07:57

## 2018-08-01 NOTE — TELEPHONE ENCOUNTER
08/01/2018- PT CALLED BACK AND I ADVISED PT DR. PANIAGUA SAID HE COULD SEE DR. XI ZAVALA. I GAVE PT # TO DR. ZAVALA'S OFFICE AND HE IS GOING TO CALL TO SET UP HIS OWN APPT.     07/31/2018- CALLED AND LEFT MESSAGE FOR PT TO CALL ME BACK. [facility] 2:03PM    [facility]  DR. XI ZAVALA  [phone]          ----- Message from Som Paniagua MD sent at 7/31/2018 11:09 AM EDT -----  Regarding: RE: REFERRAL  Contact: [phone]  It's okay to do a hand referral to Dr. ZAVALA      ----- Message -----  From: Adriana Calles MA  Sent: 7/31/2018   9:39 AM  To: Som Paniagua MD, Sonia Gutierrez MA  Subject: REFERRAL                                         HAVING HAND ISSUES IN LEFT HAD CARPAL TUNNEL IN RIGHT AND HE USED [facility] PREVIOUSLY  HE FEELS THE CARPAL TUNNEL IS IN HIS LEFT HAND NOW AND WOULD LIKE A REFERRAL OTHER THAN [facility] AND [facility] HE WAS NOT HAPPY  WITH THEM

## 2018-08-07 ENCOUNTER — TELEPHONE (OUTPATIENT)
Dept: CARDIOLOGY | Facility: CLINIC | Age: 63
End: 2018-08-07

## 2018-08-09 DIAGNOSIS — R91.1 PULMONARY NODULE: Primary | ICD-10-CM

## 2018-10-02 ENCOUNTER — OFFICE VISIT (OUTPATIENT)
Dept: FAMILY MEDICINE CLINIC | Facility: CLINIC | Age: 63
End: 2018-10-02

## 2018-10-02 ENCOUNTER — TELEPHONE (OUTPATIENT)
Dept: FAMILY MEDICINE CLINIC | Facility: CLINIC | Age: 63
End: 2018-10-02

## 2018-10-02 VITALS
SYSTOLIC BLOOD PRESSURE: 130 MMHG | HEART RATE: 67 BPM | BODY MASS INDEX: 40.01 KG/M2 | RESPIRATION RATE: 17 BRPM | OXYGEN SATURATION: 94 % | HEIGHT: 68 IN | TEMPERATURE: 98.4 F | DIASTOLIC BLOOD PRESSURE: 80 MMHG | WEIGHT: 264 LBS

## 2018-10-02 DIAGNOSIS — J20.9 ACUTE BRONCHITIS, UNSPECIFIED ORGANISM: Primary | ICD-10-CM

## 2018-10-02 DIAGNOSIS — J01.90 ACUTE RHINOSINUSITIS: ICD-10-CM

## 2018-10-02 PROBLEM — A08.4 VIRAL GASTROENTERITIS: Status: RESOLVED | Noted: 2018-01-29 | Resolved: 2018-10-02

## 2018-10-02 PROCEDURE — 99213 OFFICE O/P EST LOW 20 MIN: CPT | Performed by: NURSE PRACTITIONER

## 2018-10-02 RX ORDER — LISINOPRIL 5 MG/1
5 TABLET ORAL DAILY
Qty: 90 TABLET | Refills: 3 | Status: SHIPPED | OUTPATIENT
Start: 2018-10-02 | End: 2019-08-30 | Stop reason: SDUPTHER

## 2018-10-02 RX ORDER — GUAIFENESIN 600 MG/1
1200 TABLET, EXTENDED RELEASE ORAL 2 TIMES DAILY
COMMUNITY
End: 2019-04-19

## 2018-10-02 RX ORDER — AMOXICILLIN AND CLAVULANATE POTASSIUM 875; 125 MG/1; MG/1
1 TABLET, FILM COATED ORAL 2 TIMES DAILY
Qty: 20 TABLET | Refills: 0 | Status: SHIPPED | OUTPATIENT
Start: 2018-10-02 | End: 2018-11-08

## 2018-10-02 NOTE — PROGRESS NOTES
Subjective   Antony Tatum is a 62 y.o. male.   Chief Complaint   Patient presents with   • Cough      and runny nose x 10 days   • Headache     x 1 0 days     Vitals:    10/02/18 0802   BP: 130/80   Pulse: 67   Resp: 17   Temp: 98.4 °F (36.9 °C)   SpO2: 94%     No LMP for male patient.    Antony is a patient of Dr Justice who is here for an acute visit.       Cough   This is a new problem. Episode onset: 10 days  The problem has been unchanged. The cough is productive of sputum. Associated symptoms include headaches, nasal congestion, postnasal drip and rhinorrhea. Pertinent negatives include no fever, shortness of breath or wheezing. Nothing aggravates the symptoms. He has tried prescription cough suppressant (hydromet, and is requesting a refill, last rx given nov 2017 ) for the symptoms. The treatment provided significant relief. There is no history of asthma or COPD.        The following portions of the patient's history were reviewed and updated as appropriate: allergies, current medications, past family history, past medical history, past social history, past surgical history and problem list.    Review of Systems   Constitutional: Negative for fever.   HENT: Positive for postnasal drip and rhinorrhea.    Respiratory: Positive for cough. Negative for shortness of breath and wheezing.    Neurological: Positive for headaches.       Objective   Physical Exam   Constitutional: Vital signs are normal. He appears well-developed and well-nourished. He does not appear ill. No distress.   HENT:   Right Ear: Tympanic membrane and ear canal normal.   Left Ear: Tympanic membrane and ear canal normal.   Nose: Mucosal edema and rhinorrhea present. Right sinus exhibits no maxillary sinus tenderness and no frontal sinus tenderness. Left sinus exhibits no maxillary sinus tenderness and no frontal sinus tenderness.   Cardiovascular: Normal rate and regular rhythm.    Pulmonary/Chest: Effort normal and breath sounds normal.    Neurological: He is alert.   Skin: Skin is warm, dry and intact.       Assessment/Plan   Antony was seen today for cough and headache.    Diagnoses and all orders for this visit:    Acute bronchitis, unspecified organism    Acute rhinosinusitis    Other orders  -     HYDROcodone-homatropine (HYCODAN) 5-1.5 MG/5ML syrup; Take 5 mL by mouth Every 6 (Six) Hours As Needed for Cough.  -     amoxicillin-clavulanate (AUGMENTIN) 875-125 MG per tablet; Take 1 tablet by mouth 2 (Two) Times a Day.      Hycodan given per patient request as it works well for him. Torito reviewed and is appropriate, if he needs refills, he should schedule an appt with Dr Reshma Keller augmentin- advised to take with food as it can cause stomach upset  Follow up if symptoms persist, worsen, or if new symptoms develop  If cough persists, return and will do a cxr

## 2018-10-29 DIAGNOSIS — Z12.5 ENCOUNTER FOR SCREENING FOR MALIGNANT NEOPLASM OF PROSTATE: ICD-10-CM

## 2018-10-29 DIAGNOSIS — Z79.899 LONG TERM USE OF DRUG: Primary | ICD-10-CM

## 2018-10-29 DIAGNOSIS — E78.5 HYPERLIPIDEMIA, UNSPECIFIED HYPERLIPIDEMIA TYPE: ICD-10-CM

## 2018-10-29 DIAGNOSIS — Z13.29 SCREENING FOR THYROID DISORDER: ICD-10-CM

## 2018-11-01 LAB
ALBUMIN SERPL-MCNC: 4.5 G/DL (ref 3.5–5.2)
ALBUMIN/GLOB SERPL: 1.8 G/DL
ALP SERPL-CCNC: 118 U/L (ref 39–117)
ALT SERPL-CCNC: 25 U/L (ref 1–41)
AST SERPL-CCNC: 20 U/L (ref 1–40)
BASOPHILS # BLD AUTO: 0.04 10*3/MM3 (ref 0–0.2)
BASOPHILS NFR BLD AUTO: 0.4 % (ref 0–1.5)
BILIRUB SERPL-MCNC: 0.7 MG/DL (ref 0.1–1.2)
BUN SERPL-MCNC: 20 MG/DL (ref 8–23)
BUN/CREAT SERPL: 22.2 (ref 7–25)
CALCIUM SERPL-MCNC: 9.5 MG/DL (ref 8.6–10.5)
CHLORIDE SERPL-SCNC: 100 MMOL/L (ref 98–107)
CHOLEST SERPL-MCNC: 156 MG/DL (ref 0–200)
CO2 SERPL-SCNC: 27.7 MMOL/L (ref 22–29)
CREAT SERPL-MCNC: 0.9 MG/DL (ref 0.76–1.27)
EOSINOPHIL # BLD AUTO: 0.4 10*3/MM3 (ref 0–0.7)
EOSINOPHIL NFR BLD AUTO: 4.2 % (ref 0.3–6.2)
ERYTHROCYTE [DISTWIDTH] IN BLOOD BY AUTOMATED COUNT: 13.9 % (ref 11.5–14.5)
GLOBULIN SER CALC-MCNC: 2.5 GM/DL
GLUCOSE SERPL-MCNC: 98 MG/DL (ref 65–99)
HCT VFR BLD AUTO: 47.3 % (ref 40.4–52.2)
HDLC SERPL-MCNC: 47 MG/DL (ref 40–60)
HGB BLD-MCNC: 14.4 G/DL (ref 13.7–17.6)
IMM GRANULOCYTES # BLD: 0.02 10*3/MM3 (ref 0–0.03)
IMM GRANULOCYTES NFR BLD: 0.2 % (ref 0–0.5)
LDLC SERPL CALC-MCNC: 82 MG/DL (ref 0–100)
LDLC/HDLC SERPL: 1.74 {RATIO}
LYMPHOCYTES # BLD AUTO: 1.87 10*3/MM3 (ref 0.9–4.8)
LYMPHOCYTES NFR BLD AUTO: 19.7 % (ref 19.6–45.3)
MCH RBC QN AUTO: 30.1 PG (ref 27–32.7)
MCHC RBC AUTO-ENTMCNC: 30.4 G/DL (ref 32.6–36.4)
MCV RBC AUTO: 98.7 FL (ref 79.8–96.2)
MONOCYTES # BLD AUTO: 0.85 10*3/MM3 (ref 0.2–1.2)
MONOCYTES NFR BLD AUTO: 9 % (ref 5–12)
NEUTROPHILS # BLD AUTO: 6.31 10*3/MM3 (ref 1.9–8.1)
NEUTROPHILS NFR BLD AUTO: 66.5 % (ref 42.7–76)
PLATELET # BLD AUTO: 272 10*3/MM3 (ref 140–500)
POTASSIUM SERPL-SCNC: 4.7 MMOL/L (ref 3.5–5.2)
PROT SERPL-MCNC: 7 G/DL (ref 6–8.5)
PSA SERPL-MCNC: 0.81 NG/ML (ref 0–4)
RBC # BLD AUTO: 4.79 10*6/MM3 (ref 4.6–6)
SODIUM SERPL-SCNC: 140 MMOL/L (ref 136–145)
TRIGL SERPL-MCNC: 137 MG/DL (ref 0–150)
TSH SERPL DL<=0.005 MIU/L-ACNC: 2.93 MIU/ML (ref 0.27–4.2)
VLDLC SERPL CALC-MCNC: 27.4 MG/DL (ref 5–40)
WBC # BLD AUTO: 9.49 10*3/MM3 (ref 4.5–10.7)

## 2018-11-08 ENCOUNTER — OFFICE VISIT (OUTPATIENT)
Dept: FAMILY MEDICINE CLINIC | Facility: CLINIC | Age: 63
End: 2018-11-08

## 2018-11-08 VITALS
SYSTOLIC BLOOD PRESSURE: 120 MMHG | DIASTOLIC BLOOD PRESSURE: 86 MMHG | HEIGHT: 68 IN | WEIGHT: 265.4 LBS | TEMPERATURE: 98.4 F | BODY MASS INDEX: 40.22 KG/M2 | HEART RATE: 69 BPM | OXYGEN SATURATION: 93 %

## 2018-11-08 DIAGNOSIS — I25.10 CORONARY ARTERY DISEASE INVOLVING NATIVE HEART WITHOUT ANGINA PECTORIS, UNSPECIFIED VESSEL OR LESION TYPE: ICD-10-CM

## 2018-11-08 DIAGNOSIS — I10 ESSENTIAL HYPERTENSION: ICD-10-CM

## 2018-11-08 DIAGNOSIS — E78.5 HYPERLIPIDEMIA, UNSPECIFIED HYPERLIPIDEMIA TYPE: ICD-10-CM

## 2018-11-08 DIAGNOSIS — E66.01 CLASS 3 SEVERE OBESITY DUE TO EXCESS CALORIES WITHOUT SERIOUS COMORBIDITY WITH BODY MASS INDEX (BMI) OF 40.0 TO 44.9 IN ADULT (HCC): ICD-10-CM

## 2018-11-08 DIAGNOSIS — Z23 NEED FOR IMMUNIZATION AGAINST INFLUENZA: Primary | ICD-10-CM

## 2018-11-08 DIAGNOSIS — K21.9 GASTROESOPHAGEAL REFLUX DISEASE, ESOPHAGITIS PRESENCE NOT SPECIFIED: ICD-10-CM

## 2018-11-08 PROCEDURE — 99214 OFFICE O/P EST MOD 30 MIN: CPT | Performed by: INTERNAL MEDICINE

## 2018-11-08 PROCEDURE — 90471 IMMUNIZATION ADMIN: CPT | Performed by: INTERNAL MEDICINE

## 2018-11-08 PROCEDURE — 90674 CCIIV4 VAC NO PRSV 0.5 ML IM: CPT | Performed by: INTERNAL MEDICINE

## 2018-11-08 NOTE — PROGRESS NOTES
Subjective   Antony Tatum is a 62 y.o. male. Patient is here today for follow-up on his hypertension, hyperlipoidemia, GERD, obesity and history of coronary artery disease.  He's generally doing well and feels fine and is had no chest pain, shortness of breath, edema or myalgias.  He is noted no medicine side effects.  Chief Complaint   Patient presents with   • Hyperlipidemia     lab follow up           Vitals:    11/08/18 0816   BP: 120/86   Pulse: 69   Temp: 98.4 °F (36.9 °C)   SpO2: 93%     The following portions of the patient's history were reviewed and updated as appropriate: allergies, current medications, past family history, past medical history, past social history, past surgical history and problem list.    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     Social History Main Topics   • Smoking status: Never Smoker   • Smokeless tobacco: Never Used   • Alcohol use 1.2 oz/week     2 Shots of liquor per week      Comment: weekly   • Drug use: No   • Sexual activity: Defer     Other Topics Concern   • Not on file     Social History Narrative   • No narrative on file        Current Outpatient Prescriptions:   •  aspirin 81 MG tablet, Take by mouth., Disp: , Rfl:   •  atorvastatin (LIPITOR) 20 MG tablet, Take 1 tablet by mouth Daily., Disp: 90 tablet, Rfl: 3  •  guaiFENesin (MUCINEX) 600 MG 12 hr tablet, Take 1,200 mg by mouth 2 (Two)  Times a Day., Disp: , Rfl:   •  HYDROcodone-homatropine (HYCODAN) 5-1.5 MG/5ML syrup, Take 5 mL by mouth Every 6 (Six) Hours As Needed for Cough., Disp: 180 mL, Rfl: 0  •  lisinopril (PRINIVIL,ZESTRIL) 5 MG tablet, Take 1 tablet by mouth Daily. Take one tablet three days a week, Disp: 90 tablet, Rfl: 3  •  pantoprazole (PROTONIX) 40 MG EC tablet, Take 1 tablet by mouth Daily. TK ONE T PO QD, Disp: 90 tablet, Rfl: 3  •  sucralfate (CARAFATE) 1 g tablet, Take 1 tablet by mouth 3 (Three) Times a Day., Disp: 90 tablet, Rfl: 11  •  zolpidem (AMBIEN) 10 MG tablet, Take 1 tablet by mouth At Night As Needed for Sleep., Disp: 30 tablet, Rfl: 0     Objective     History of Present Illness     Review of Systems   Constitutional: Negative.    HENT: Negative.    Eyes: Negative.    Respiratory: Negative.    Cardiovascular: Negative.    Gastrointestinal: Negative.    Genitourinary: Negative.    Musculoskeletal: Negative.    Skin: Negative.    Neurological: Negative.    Psychiatric/Behavioral: Negative.        Physical Exam   Constitutional: He is oriented to person, place, and time. He appears well-developed and well-nourished.   Pleasant, cooperative no distress blood pressure 130/80   HENT:   Head: Normocephalic and atraumatic.   Eyes: Pupils are equal, round, and reactive to light. Conjunctivae are normal. No scleral icterus.   Neck: Normal range of motion. Neck supple.   Cardiovascular: Normal rate, regular rhythm and normal heart sounds.    Pulmonary/Chest: Effort normal and breath sounds normal. No respiratory distress. He has no wheezes. He has no rales.   Musculoskeletal: Normal range of motion. He exhibits no edema.   Neurological: He is alert and oriented to person, place, and time.   Skin: Skin is warm and dry.   Psychiatric: He has a normal mood and affect. His behavior is normal.   Nursing note and vitals reviewed.      ASSESSMENT  CBC was essentially normal as well as CMP.  PSA and TSH were both quite normal.  Her  lipid panel is certainly pretty good with total cholesterol 156, HDL 47, LDL 82.  #1-hypertension, adequately controlled on medication  #2-hyperlipidemia, reasonable control on medication  #3-GERD, controlled on medication  #4-obesity with no significant weight gain or loss  #5-history of coronary artery disease, asymptomatic     Problem List Items Addressed This Visit        Cardiovascular and Mediastinum    Hyperlipidemia    Hypertension    Coronary artery disease       Digestive    Adiposity    GERD (gastroesophageal reflux disease)      Other Visit Diagnoses     Need for immunization against influenza    -  Primary    Relevant Orders    Flucelvax Quad=>4Years (PFS) (Completed)          PLAN  the patient will continue current medicines as now.  I encouraged him to lose some weight.  He did receive a flu shot.  I recommended the shingles vaccinations.  I plan on rechecking him in 6 months with a CMP, lipid panel    There are no Patient Instructions on file for this visit.  Return in about 6 months (around 5/8/2019) for with labs.

## 2018-11-30 RX ORDER — ZOLPIDEM TARTRATE 10 MG/1
10 TABLET ORAL NIGHTLY PRN
Qty: 30 TABLET | Refills: 0 | Status: SHIPPED | OUTPATIENT
Start: 2018-11-30 | End: 2019-06-10 | Stop reason: SDUPTHER

## 2019-02-15 RX ORDER — PANTOPRAZOLE SODIUM 40 MG/1
40 TABLET, DELAYED RELEASE ORAL DAILY
Qty: 90 TABLET | Refills: 3 | Status: SHIPPED | OUTPATIENT
Start: 2019-02-15 | End: 2019-04-19 | Stop reason: SDUPTHER

## 2019-02-18 ENCOUNTER — TELEPHONE (OUTPATIENT)
Dept: GASTROENTEROLOGY | Facility: CLINIC | Age: 64
End: 2019-02-18

## 2019-02-18 NOTE — TELEPHONE ENCOUNTER
Faxed request received from [facility] for pantoprazole 40 mg 1 tab po daily, #90.      Pt last seen 1/17/18.  Denial faxed to [phone]  - confirmation received.  
Clear bilaterally, pupils equal, round and reactive to light. EOMI. No scleral icterus. No conjunctival injection. No discharge bilaterally. No nystagmus appreciated bilaterally.

## 2019-03-20 ENCOUNTER — OFFICE VISIT (OUTPATIENT)
Dept: GASTROENTEROLOGY | Facility: CLINIC | Age: 64
End: 2019-03-20

## 2019-03-20 VITALS
SYSTOLIC BLOOD PRESSURE: 148 MMHG | DIASTOLIC BLOOD PRESSURE: 88 MMHG | WEIGHT: 263.6 LBS | BODY MASS INDEX: 51.75 KG/M2 | HEIGHT: 60 IN | TEMPERATURE: 98.2 F

## 2019-03-20 DIAGNOSIS — K21.9 GASTROESOPHAGEAL REFLUX DISEASE, ESOPHAGITIS PRESENCE NOT SPECIFIED: Primary | ICD-10-CM

## 2019-03-20 PROCEDURE — 99213 OFFICE O/P EST LOW 20 MIN: CPT | Performed by: NURSE PRACTITIONER

## 2019-03-20 RX ORDER — SUCRALFATE 1 G/1
1 TABLET ORAL 3 TIMES DAILY
Qty: 90 TABLET | Refills: 11 | Status: SHIPPED | OUTPATIENT
Start: 2019-03-20 | End: 2020-03-27 | Stop reason: SDUPTHER

## 2019-03-20 NOTE — PROGRESS NOTES
"Chief Complaint   Patient presents with   • Heartburn     HPI    Antony Tatum is a  63 y.o. male here for a follow up visit for heartburn.  This is an established patient, new to me, followed by Dr. Nolen.  A long-standing history of GERD maintained on pantoprazole 40 mg taken once daily.  If he misses a dose of PPI he will immediately experience heartburn.  He also uses Carafate 1-2 times a week for \"belly ache\" possibly triggered by spicy or rich foods.  He has been using Carafate as needed for the last several years.  Denies nausea, vomiting, or dysphagia.  His appetite is good. He does not smoke. He does not drink alcohol.  He uses NSAIDs several times a week for wrist pain.  He is 263 pounds on physical exam.    The last time his esophagus and stomach were examined was 2016 by Dr. Jacobson.  EGD revealed mild erythema of the stomach and medium size hiatal hernia.    Bowels are moving regular.  He denies diarrhea, constipation, or rectal bleeding.    He is due for colon cancer screening in 2021.    Past Medical History:   Diagnosis Date   • Anxiety    • Coronary artery disease    • Depression    • Diverticulosis    • Erosive gastritis    • Family hx of colon cancer    • H/O CT scan of abdomen 01/29/2014    ddd, tics, tortuosity w/aortoiliac atherosclerosis, HH, cardiac enlargement   • Heart attack (CMS/HCC)    • Hepatitis     HEP C   • Hiatal hernia    • History of CT scan of abdomen    • Hyperlipidemia    • Hypertension    • Obese    • Thoracic aortic aneurysm without rupture (CMS/HCC)        Past Surgical History:   Procedure Laterality Date   • CERVICAL DISCECTOMY ANTERIOR      anterior spinal, osteophytectomy cerv interspace   • CERVICAL DISCECTOMY ANTERIOR  12/07/2015    spinal; C5-C6 and C6-C7 anterior cervical discectomy and arthrodesis; Jon Lua   • CHOLECYSTECTOMY     • COLONOSCOPY  06/10/2016    himanshu Sterling M.D.   • CORONARY ANGIOPLASTY WITH STENT PLACEMENT     • CORONARY ANGIOPLASTY " WITH STENT PLACEMENT N/A     10yrs ago with Dr Ruff at [facility]; debatable at whether he had an MI that admission   • ENDOSCOPY N/A 12/28/2016    Medium sized HH, gastritis.     • GALLBLADDER SURGERY     • UPPER GASTROINTESTINAL ENDOSCOPY  02/12/2014    LA Grade A reflux esophagitis, HH, erosive gtastritis, bx       Scheduled Meds:  Outpatient Encounter Medications as of 3/20/2019   Medication Sig Dispense Refill   • aspirin 81 MG tablet Take by mouth.     • atorvastatin (LIPITOR) 20 MG tablet Take 1 tablet by mouth Daily. 90 tablet 3   • guaiFENesin (MUCINEX) 600 MG 12 hr tablet Take 1,200 mg by mouth 2 (Two) Times a Day.     • lisinopril (PRINIVIL,ZESTRIL) 5 MG tablet Take 1 tablet by mouth Daily. Take one tablet three days a week 90 tablet 3   • pantoprazole (PROTONIX) 40 MG EC tablet Take 1 tablet by mouth Daily. TK ONE T PO QD 90 tablet 3   • sucralfate (CARAFATE) 1 g tablet Take 1 tablet by mouth 3 (Three) Times a Day. 90 tablet 11   • zolpidem (AMBIEN) 10 MG tablet Take 1 tablet by mouth At Night As Needed for Sleep. 30 tablet 0   • [DISCONTINUED] sucralfate (CARAFATE) 1 g tablet Take 1 tablet by mouth 3 (Three) Times a Day. 90 tablet 11   • HYDROcodone-homatropine (HYCODAN) 5-1.5 MG/5ML syrup Take 5 mL by mouth Every 6 (Six) Hours As Needed for Cough. 180 mL 0     No facility-administered encounter medications on file as of 3/20/2019.        Continuous Infusions:  No current facility-administered medications for this visit.     PRN Meds:.    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   HENT: Negative for trouble swallowing.    Respiratory: Negative for apnea, cough, choking, chest tightness, shortness of breath and wheezing.    Cardiovascular: Negative for chest pain, palpitations and leg swelling.   Gastrointestinal: Negative for abdominal distention, abdominal pain, anal bleeding, blood in stool, constipation, diarrhea, nausea, rectal pain and vomiting.       Vitals:    03/20/19 1303   BP: 148/88   Temp: 98.2 °F (36.8 °C)       Physical Exam   Constitutional: He is oriented to person, place, and time. He appears well-developed and well-nourished.   Eyes: Pupils are equal, round, and reactive to light.   Neck: Normal range of motion.   Cardiovascular: Normal rate, regular rhythm and normal heart sounds.   Pulmonary/Chest: Effort normal and breath sounds normal. No respiratory distress. He has no wheezes.   Abdominal: Soft. Bowel sounds are normal. He exhibits no distension and no mass. There is no tenderness. There is no guarding. No hernia.   Musculoskeletal: Normal range of motion.   Neurological: He is alert and oriented to person, place, and time.   Skin: Skin is warm and dry. Capillary refill takes less than 2 seconds.   Psychiatric: He has a normal mood and affect. His behavior is normal.       No images are attached to the encounter.    Antony was seen today for heartburn.    Diagnoses and all orders for this visit:    Gastroesophageal reflux disease, esophagitis presence not specified    Other orders  -     sucralfate (CARAFATE) 1 g tablet; Take 1 tablet by mouth 3 (Three) Times a Day.      Assessment    #1 GERD,  "seems to be well controlled on PPI therapy however he does mention using Carafate as needed for \"belly ache.\"  Some remote NSAID use lately for wrist pain.  Needs a refill of Carafate.    Plan    Continue PPI.  Avoid NSAIDs, consider using Tylenol for pain relief.  Refill Carafate.  I will update Dr. Nolen on patient issues.  Colon cancer screening in 2021.  Follow-up 1 year or sooner if needed.          "

## 2019-04-15 ENCOUNTER — OFFICE VISIT (OUTPATIENT)
Dept: FAMILY MEDICINE CLINIC | Facility: CLINIC | Age: 64
End: 2019-04-15

## 2019-04-15 VITALS
RESPIRATION RATE: 18 BRPM | HEIGHT: 60 IN | HEART RATE: 88 BPM | BODY MASS INDEX: 50.26 KG/M2 | SYSTOLIC BLOOD PRESSURE: 106 MMHG | DIASTOLIC BLOOD PRESSURE: 70 MMHG | WEIGHT: 256 LBS

## 2019-04-15 DIAGNOSIS — M25.511 CHRONIC RIGHT SHOULDER PAIN: Primary | ICD-10-CM

## 2019-04-15 DIAGNOSIS — G89.29 CHRONIC RIGHT SHOULDER PAIN: Primary | ICD-10-CM

## 2019-04-15 PROCEDURE — 99213 OFFICE O/P EST LOW 20 MIN: CPT | Performed by: INTERNAL MEDICINE

## 2019-04-15 PROCEDURE — 73030 X-RAY EXAM OF SHOULDER: CPT | Performed by: INTERNAL MEDICINE

## 2019-04-15 RX ORDER — DICLOFENAC SODIUM 75 MG/1
75 TABLET, DELAYED RELEASE ORAL 2 TIMES DAILY
Qty: 60 TABLET | Refills: 0 | Status: SHIPPED | OUTPATIENT
Start: 2019-04-15 | End: 2019-04-19 | Stop reason: SINTOL
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
## 2019-04-15 NOTE — PATIENT INSTRUCTIONS
X-ray of the right shoulder shows arthritis of the acromioclavicular joint.  Discussed proper shoulder posture.  Start diclofenac 75 mg twice a day with food and watch for GI side effects.  Will refer to physical therapy.  If no improvement will refer to orthopedic surgery.

## 2019-04-19 ENCOUNTER — OFFICE VISIT (OUTPATIENT)
Dept: GASTROENTEROLOGY | Facility: CLINIC | Age: 64
End: 2019-04-19

## 2019-04-19 VITALS
SYSTOLIC BLOOD PRESSURE: 126 MMHG | BODY MASS INDEX: 50.38 KG/M2 | WEIGHT: 256.6 LBS | DIASTOLIC BLOOD PRESSURE: 84 MMHG | HEIGHT: 60 IN | TEMPERATURE: 98 F

## 2019-04-19 DIAGNOSIS — R10.10 PAIN OF UPPER ABDOMEN: ICD-10-CM

## 2019-04-19 DIAGNOSIS — K21.9 GASTROESOPHAGEAL REFLUX DISEASE, ESOPHAGITIS PRESENCE NOT SPECIFIED: Primary | ICD-10-CM

## 2019-04-19 PROCEDURE — 99214 OFFICE O/P EST MOD 30 MIN: CPT | Performed by: NURSE PRACTITIONER

## 2019-04-19 RX ORDER — PANTOPRAZOLE SODIUM 40 MG/1
40 TABLET, DELAYED RELEASE ORAL 2 TIMES DAILY
Qty: 180 TABLET | Refills: 3 | Status: SHIPPED | OUTPATIENT
Start: 2019-04-19 | End: 2021-02-03 | Stop reason: SDUPTHER

## 2019-04-19 NOTE — PROGRESS NOTES
Chief Complaint   Patient presents with   • Follow-up   • Heartburn       Antony Tatum is a  63 y.o. male here for a follow up visit for GERD.    HPI  64 yo m presents today for follow up visit for GERD. He is a patient of Dr. Nolen. He was last seen in the office on 3/20/19. He has hx GERD and had been taking Protonix 40 mg daily and PRN Carafate. He admits he has been taking carafate almost daily along with the protonix. After his last visit here in the office he decided to wean himself off the carafate. He admits the first week without it he was fine with just taking the protonix in am. Then he reports the 2nd week he was miserable. He admits he started back on the carafate up to QID and taking the protonix 40 mg daily. He admits he is still having tons of reflux and upper abd pain. He denies any dysphagia, vomiting, diarrhea, constipation, rectal bleeding or melena. He admits his appetite is decreased and he has lost about 7 lbs since his last visit here last month.       Past Medical History:   Diagnosis Date   • Anxiety    • Coronary artery disease    • Depression    • Diverticulosis    • Erosive gastritis    • Family hx of colon cancer    • H/O CT scan of abdomen 01/29/2014    ddd, tics, tortuosity w/aortoiliac atherosclerosis, HH, cardiac enlargement   • Heart attack (CMS/HCC)    • Hepatitis     HEP C   • Hiatal hernia    • History of CT scan of abdomen    • Hyperlipidemia    • Hypertension    • Obese    • Thoracic aortic aneurysm without rupture (CMS/HCC)        Past Surgical History:   Procedure Laterality Date   • CERVICAL DISCECTOMY ANTERIOR      anterior spinal, osteophytectomy cerv interspace   • CERVICAL DISCECTOMY ANTERIOR  12/07/2015    spinal; C5-C6 and C6-C7 anterior cervical discectomy and arthrodesis; Jon Lua   • CHOLECYSTECTOMY     • COLONOSCOPY  06/10/2016    himanshu Sterling M.D.   • CORONARY ANGIOPLASTY WITH STENT PLACEMENT     • CORONARY ANGIOPLASTY WITH STENT PLACEMENT N/A      10yrs ago with Dr Ruff at [facility]; debatable at whether he had an MI that admission   • ENDOSCOPY N/A 12/28/2016    Medium sized HH, gastritis.     • GALLBLADDER SURGERY     • UPPER GASTROINTESTINAL ENDOSCOPY  02/12/2014    LA Grade A reflux esophagitis, HH, erosive gtastritis, bx       Scheduled Meds:    Continuous Infusions:  No current facility-administered medications for this visit.     PRN Meds:.    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 Negative for chills, diaphoresis, fatigue and fever.   HENT: Negative for nosebleeds, postnasal drip, sore throat, trouble swallowing and voice change.    Respiratory: Negative for cough, choking, chest tightness, shortness of breath and wheezing.    Cardiovascular: Negative for chest pain, palpitations and leg swelling.   Gastrointestinal: Positive for abdominal distention, abdominal pain and nausea. Negative for anal bleeding, blood in stool, constipation, diarrhea, rectal pain and vomiting.   Endocrine: Negative for polydipsia, polyphagia and polyuria.   Musculoskeletal: Negative for gait problem.   Skin: Negative for rash and wound.   Allergic/Immunologic: Negative for food allergies.   Neurological:  Negative for dizziness, speech difficulty and light-headedness.   Psychiatric/Behavioral: Negative for confusion, self-injury, sleep disturbance and suicidal ideas.       Vitals:    04/19/19 1436   BP: 126/84   Temp: 98 °F (36.7 °C)       Physical Exam   Constitutional: He is oriented to person, place, and time. He appears well-developed and well-nourished. He does not appear ill. No distress.   HENT:   Head: Normocephalic.   Eyes: Pupils are equal, round, and reactive to light.   Cardiovascular: Normal rate, regular rhythm and normal heart sounds.   Pulmonary/Chest: Effort normal and breath sounds normal.   Abdominal: Soft. Bowel sounds are normal. He exhibits no distension and no mass. There is no hepatosplenomegaly. There is tenderness. There is no rebound and no guarding. No hernia.       Musculoskeletal: Normal range of motion.   Neurological: He is alert and oriented to person, place, and time.   Skin: Skin is warm and dry.   Psychiatric: He has a normal mood and affect. His speech is normal and behavior is normal. Judgment normal.       No images are attached to the encounter.    Assessment & Plan    1. Gastroesophageal reflux disease, esophagitis presence not specified  - pantoprazole (PROTONIX) 40 MG EC tablet; Take 1 tablet by mouth 2 (Two) Times a Day.  Dispense: 180 tablet; Refill: 3    2. Pain of upper abdomen    GERD is not well controlled. Obviously he needed the carafate which means the protonix 40 mg daily was not enough to cover his GERD effectively. Will increase the protonix to BID and add back the carafate QID for now. Can also add some daily pepcid as well. Continue GERD precautions. Call office Monday with update.

## 2019-05-03 DIAGNOSIS — E78.2 MIXED HYPERLIPIDEMIA: Primary | ICD-10-CM

## 2019-05-03 DIAGNOSIS — I10 ESSENTIAL HYPERTENSION: ICD-10-CM

## 2019-05-09 LAB
ALBUMIN SERPL-MCNC: 4.7 G/DL (ref 3.5–5.2)
ALBUMIN/GLOB SERPL: 2 G/DL
ALP SERPL-CCNC: 121 U/L (ref 39–117)
ALT SERPL-CCNC: 23 U/L (ref 1–41)
AST SERPL-CCNC: 18 U/L (ref 1–40)
BILIRUB SERPL-MCNC: 0.8 MG/DL (ref 0.2–1.2)
BUN SERPL-MCNC: 20 MG/DL (ref 8–23)
BUN/CREAT SERPL: 22 (ref 7–25)
CALCIUM SERPL-MCNC: 10.2 MG/DL (ref 8.6–10.5)
CHLORIDE SERPL-SCNC: 101 MMOL/L (ref 98–107)
CHOLEST SERPL-MCNC: 136 MG/DL (ref 0–200)
CO2 SERPL-SCNC: 27.9 MMOL/L (ref 22–29)
CREAT SERPL-MCNC: 0.91 MG/DL (ref 0.76–1.27)
GLOBULIN SER CALC-MCNC: 2.3 GM/DL
GLUCOSE SERPL-MCNC: 105 MG/DL (ref 65–99)
HDLC SERPL-MCNC: 45 MG/DL (ref 40–60)
LDLC SERPL CALC-MCNC: 74 MG/DL (ref 0–100)
LDLC/HDLC SERPL: 1.64 {RATIO}
POTASSIUM SERPL-SCNC: 4.9 MMOL/L (ref 3.5–5.2)
PROT SERPL-MCNC: 7 G/DL (ref 6–8.5)
SODIUM SERPL-SCNC: 141 MMOL/L (ref 136–145)
TRIGL SERPL-MCNC: 86 MG/DL (ref 0–150)
VLDLC SERPL CALC-MCNC: 17.2 MG/DL

## 2019-05-16 ENCOUNTER — OFFICE VISIT (OUTPATIENT)
Dept: FAMILY MEDICINE CLINIC | Facility: CLINIC | Age: 64
End: 2019-05-16

## 2019-05-16 VITALS
OXYGEN SATURATION: 98 % | WEIGHT: 253 LBS | TEMPERATURE: 97.1 F | DIASTOLIC BLOOD PRESSURE: 80 MMHG | HEART RATE: 68 BPM | SYSTOLIC BLOOD PRESSURE: 140 MMHG | RESPIRATION RATE: 16 BRPM | HEIGHT: 67 IN | BODY MASS INDEX: 39.71 KG/M2

## 2019-05-16 DIAGNOSIS — I25.10 CORONARY ARTERY DISEASE INVOLVING NATIVE HEART WITHOUT ANGINA PECTORIS, UNSPECIFIED VESSEL OR LESION TYPE: Primary | ICD-10-CM

## 2019-05-16 DIAGNOSIS — I10 ESSENTIAL HYPERTENSION: ICD-10-CM

## 2019-05-16 DIAGNOSIS — L98.9 SKIN LESION: ICD-10-CM

## 2019-05-16 DIAGNOSIS — E78.5 HYPERLIPIDEMIA, UNSPECIFIED HYPERLIPIDEMIA TYPE: ICD-10-CM

## 2019-05-16 DIAGNOSIS — Z23 NEED FOR VACCINATION: ICD-10-CM

## 2019-05-16 PROCEDURE — 90471 IMMUNIZATION ADMIN: CPT | Performed by: INTERNAL MEDICINE

## 2019-05-16 PROCEDURE — 99214 OFFICE O/P EST MOD 30 MIN: CPT | Performed by: INTERNAL MEDICINE

## 2019-05-16 PROCEDURE — 90670 PCV13 VACCINE IM: CPT | Performed by: INTERNAL MEDICINE

## 2019-05-16 NOTE — PROGRESS NOTES
Louise Tatum is a 63 y.o. male. Patient is here today for follow-up on his hypertension, hyperlipidemia, history of coronary artery disease.  Patient's generally been feeling okay and has had no chest pain, shortness of breath, edema or myalgias.  He tells me he is only taking the lisinopril and the atorvastatin every other day.  He is opposed to pills.  His only other new problem is a skin lesion on his right shoulder is been present for for 5 months and just will not heal up.  He has seen a dermatologist before in the past.  Chief Complaint   Patient presents with   • Hyperlipidemia   • Hypertension   • Shoulder Problem     right x 6-8 mths          Vitals:    05/16/19 0824   BP: 140/80   Pulse: 68   Resp: 16   Temp: 97.1 °F (36.2 °C)   SpO2: 98%     The following portions of the patient's history were reviewed and updated as appropriate: allergies, current medications, past family history, past medical history, past social history, past surgical history and problem list.    Past Medical History:   Diagnosis Date   • Anxiety    • Coronary artery disease    • Depression    • Diverticulosis    • Erosive gastritis    • Family hx of colon cancer    • H/O CT scan of abdomen 01/29/2014    ddd, tics, tortuosity w/aortoiliac atherosclerosis, HH, cardiac enlargement   • Heart attack (CMS/HCC)    • Hepatitis     HEP C   • Hiatal hernia    • History of CT scan of abdomen    • Hyperlipidemia    • Hypertension    • Obese    • Thoracic aortic aneurysm without rupture (CMS/HCC)       No Known Allergies   Social History     Socioeconomic History   • Marital status:      Spouse name: Not on file   • Number of children: Not on file   • Years of education: Not on file   • Highest education level: Not on file   Occupational History   • 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   Tobacco Use   • Smoking status: Never Smoker   • Smokeless tobacco: Never Used   Substance and Sexual Activity   • Alcohol use: Yes      Alcohol/week: 1.2 oz     Types: 2 Shots of liquor per week     Comment: weekly   • Drug use: No   • Sexual activity: Defer        Current Outpatient Medications:   •  aspirin 81 MG tablet, Take by mouth., Disp: , Rfl:   •  atorvastatin (LIPITOR) 20 MG tablet, Take 1 tablet by mouth Daily., Disp: 90 tablet, Rfl: 3  •  diclofenac (VOLTAREN) 1 % gel gel, APPLY 1-2 GRAMS TO THE JOINT 4 TIMES A DAY AS NEEDED, Disp: 100 g, Rfl: 0  •  lisinopril (PRINIVIL,ZESTRIL) 5 MG tablet, Take 1 tablet by mouth Daily. Take one tablet three days a week, Disp: 90 tablet, Rfl: 3  •  pantoprazole (PROTONIX) 40 MG EC tablet, Take 1 tablet by mouth 2 (Two) Times a Day., Disp: 180 tablet, Rfl: 3  •  sucralfate (CARAFATE) 1 g tablet, Take 1 tablet by mouth 3 (Three) Times a Day., Disp: 90 tablet, Rfl: 11  •  zolpidem (AMBIEN) 10 MG tablet, Take 1 tablet by mouth At Night As Needed for Sleep., Disp: 30 tablet, Rfl: 0     Objective     History of Present Illness     Review of Systems   Constitutional: Negative.    HENT: Negative.    Eyes: Negative.    Respiratory: Negative.    Cardiovascular: Negative.    Gastrointestinal: Negative.    Genitourinary: Negative.    Musculoskeletal: Negative.    Skin: Positive for wound.        Nonhealing lesion on the right shoulder   Neurological: Negative.    Psychiatric/Behavioral: Negative.        Physical Exam   Constitutional: He is oriented to person, place, and time. He appears well-developed and well-nourished.   Stocky white male in no distress who has lost some weight, blood pressure 130/75   HENT:   Head: Normocephalic and atraumatic.   Eyes: Conjunctivae are normal. Pupils are equal, round, and reactive to light. No scleral icterus.   Neck: Normal range of motion. Neck supple.   Cardiovascular: Normal rate, regular rhythm and normal heart sounds.   Pulmonary/Chest: Effort normal and breath sounds normal. No respiratory distress. He has no wheezes. He has no rales.   Musculoskeletal: Normal range  of motion. He exhibits no edema.   Neurological: He is alert and oriented to person, place, and time.   Skin: Skin is warm and dry.   There is about a 3 mm lesion on the right upper shoulder that just is open skin that is not healed.   Psychiatric: He has a normal mood and affect. His behavior is normal.   Nursing note and vitals reviewed.      ASSESSMENT CMP has a sugar of 105 and alkaline phosphatase is 121 and is otherwise normal.  Lipid 136, HDL 45 and LDL 74.    #1-hypertension, reasonably controlled  #2-hyperlipidemia, reasonable control  #3-coronary artery disease, asymptomatic  #4-nonhealing skin lesion on the right shoulder, needs dermatology evaluation     Problem List Items Addressed This Visit        Cardiovascular and Mediastinum    Hyperlipidemia    Hypertension    Coronary artery disease - Primary       Musculoskeletal and Integument    Skin lesion          PLAN the patient received a Prevnar 13 vaccination.  I admonished the patient to take his lisinopril and atorvastatin every day in view of his coronary history.  The patient is somewhat resistant to this idea, saying he does not like the idea of pills.  I am not sure whether he will take them daily or not.  Plan on rechecking him in 4 months with a CMP, lipid panel, hemoglobin A1c    There are no Patient Instructions on file for this visit.  Return in about 4 months (around 9/16/2019) for with labs.

## 2019-06-07 RX ORDER — ZOLPIDEM TARTRATE 10 MG/1
TABLET ORAL
Qty: 30 TABLET | Refills: 0 | Status: CANCELLED | OUTPATIENT
Start: 2019-06-07

## 2019-06-11 RX ORDER — ZOLPIDEM TARTRATE 10 MG/1
10 TABLET ORAL NIGHTLY PRN
Qty: 30 TABLET | Refills: 0 | Status: SHIPPED | OUTPATIENT
Start: 2019-06-11 | End: 2019-06-14 | Stop reason: SDUPTHER

## 2019-06-14 RX ORDER — ZOLPIDEM TARTRATE 5 MG/1
5 TABLET ORAL NIGHTLY PRN
Qty: 30 TABLET | Refills: 0 | Status: SHIPPED | OUTPATIENT
Start: 2019-06-14 | End: 2019-11-08

## 2019-06-21 ENCOUNTER — TRANSCRIBE ORDERS (OUTPATIENT)
Dept: ADMINISTRATIVE | Facility: HOSPITAL | Age: 64
End: 2019-06-21

## 2019-06-21 DIAGNOSIS — R91.1 PULMONARY NODULE: Primary | ICD-10-CM

## 2019-06-26 ENCOUNTER — OFFICE VISIT (OUTPATIENT)
Dept: FAMILY MEDICINE CLINIC | Facility: CLINIC | Age: 64
End: 2019-06-26

## 2019-06-26 VITALS
HEART RATE: 67 BPM | TEMPERATURE: 97.9 F | RESPIRATION RATE: 16 BRPM | WEIGHT: 247 LBS | HEIGHT: 67 IN | SYSTOLIC BLOOD PRESSURE: 120 MMHG | OXYGEN SATURATION: 97 % | BODY MASS INDEX: 38.77 KG/M2 | DIASTOLIC BLOOD PRESSURE: 72 MMHG

## 2019-06-26 DIAGNOSIS — J01.90 ACUTE RHINOSINUSITIS: Primary | ICD-10-CM

## 2019-06-26 DIAGNOSIS — R05.9 COUGH: ICD-10-CM

## 2019-06-26 PROCEDURE — 99213 OFFICE O/P EST LOW 20 MIN: CPT | Performed by: NURSE PRACTITIONER

## 2019-06-26 RX ORDER — AMOXICILLIN AND CLAVULANATE POTASSIUM 875; 125 MG/1; MG/1
1 TABLET, FILM COATED ORAL 2 TIMES DAILY
Qty: 20 TABLET | Refills: 0 | Status: SHIPPED | OUTPATIENT
Start: 2019-06-26 | End: 2019-11-08

## 2019-06-26 NOTE — PROGRESS NOTES
Subjective   Antony Tatum is a 63 y.o. male.   Chief Complaint   Patient presents with   • Cough     cough and congestion x 1 month      Vitals:    06/26/19 0756   BP: 120/72   Pulse: 67   Resp: 16   Temp: 97.9 °F (36.6 °C)   SpO2: 97%     No LMP for male patient.    Antony is a patient of Dr Justice who is here for an acute visit       Cough   This is a new problem. The current episode started more than 1 month ago. The problem has been unchanged. The cough is productive of sputum. Associated symptoms include nasal congestion, postnasal drip and rhinorrhea. Pertinent negatives include no chills, ear pain, fever, sore throat, shortness of breath or wheezing. Nothing aggravates the symptoms. Treatments tried: allegra  The treatment provided no relief.   He requests an rx for Hycodan cough syrup because that is the only thing that works for him       The following portions of the patient's history were reviewed and updated as appropriate: allergies, current medications, past family history, past medical history, past social history, past surgical history and problem list.    Review of Systems   Constitutional: Positive for fatigue. Negative for chills and fever.   HENT: Positive for congestion, postnasal drip and rhinorrhea. Negative for ear pain and sore throat.    Respiratory: Positive for cough. Negative for shortness of breath and wheezing.    Cardiovascular: Negative.    Psychiatric/Behavioral: Positive for sleep disturbance (from cough ).       Objective   Physical Exam   Constitutional: Vital signs are normal. He appears well-developed and well-nourished. No distress.   HENT:   Right Ear: Tympanic membrane normal.   Left Ear: Tympanic membrane normal.   Nose: Mucosal edema and rhinorrhea present.   Mouth/Throat: Uvula is midline. Posterior oropharyngeal erythema present.   Cardiovascular: Normal rate and regular rhythm.   Pulmonary/Chest: Effort normal. He has wheezes (faint wheezes noted anteriorly ) in the  right upper field and the left lower field. He has no rales.   Neurological: He is alert.       Assessment/Plan   Antony was seen today for cough.    Diagnoses and all orders for this visit:    Acute rhinosinusitis    Cough    Other orders  -     amoxicillin-clavulanate (AUGMENTIN) 875-125 MG per tablet; Take 1 tablet by mouth 2 (Two) Times a Day.  -     HYDROcodone-homatropine (HYCODAN) 5-1.5 MG/5ML syrup; Take 5 mL by mouth Every 8 (Eight) Hours As Needed for Cough.      Rest and drink plenty of fluids  Take medication as presribed  No driving while taking hydcodan  Follow up if your symptoms persist, worsen or if new symptoms develop

## 2019-08-30 ENCOUNTER — TELEPHONE (OUTPATIENT)
Dept: FAMILY MEDICINE CLINIC | Facility: CLINIC | Age: 64
End: 2019-08-30

## 2019-08-30 RX ORDER — LISINOPRIL 5 MG/1
5 TABLET ORAL DAILY
Qty: 90 TABLET | Refills: 1 | Status: SHIPPED | OUTPATIENT
Start: 2019-08-30 | End: 2020-04-20 | Stop reason: SDUPTHER

## 2019-08-30 NOTE — TELEPHONE ENCOUNTER
SENT RX TO [facility] FOR PATIENT.     ----- Message from Som Justice MD sent at 8/30/2019  1:11 PM EDT -----  We need to get him a prescription for lisinopril 5 mg daily, #90 with 1 refill is fine      ----- Message -----  From: German Mchugh MA  Sent: 8/30/2019  12:59 PM  To: Som Justice MD    Please advise is this ok to change I cant find in his chart notes about changing it thank you   ----- Message -----  From: Regina Mars RegSched Rep  Sent: 8/29/2019  10:42 AM  To: German Mchugh MA    PT CALLED IN STATING AT LAST OV WITH  HE WAS ADVISED TO START TAKING HIS lisinopril (PRINIVIL,ZESTRIL) 5 MG DAILY INSTEAD OF 1 TIMES A DAY 3 TIMES A WEEK.    PT NEEDS A NEW SCRIPT WRITTEN FOR lisinopril (PRINIVIL,ZESTRIL) 5 MG TAKE 1 TABLET BY MOUTH DAILY #90    [facility].  IF YOU HAVE ANY QUESTIONS PLEASE CONTAC PT [phone]

## 2019-09-09 ENCOUNTER — HOSPITAL ENCOUNTER (OUTPATIENT)
Dept: CT IMAGING | Facility: HOSPITAL | Age: 64
Discharge: HOME | End: 2019-09-09
Admitting: INTERNAL MEDICINE

## 2019-09-09 DIAGNOSIS — R91.1 PULMONARY NODULE: ICD-10-CM

## 2019-09-09 LAB — CREAT BLDA-MCNC: 0.8 MG/DL (ref 0.6–1.3)

## 2019-09-09 PROCEDURE — 25010000002 IOPAMIDOL 61 % SOLUTION: Performed by: INTERNAL MEDICINE

## 2019-09-09 PROCEDURE — 82565 ASSAY OF CREATININE: CPT

## 2019-09-09 PROCEDURE — 71260 CT THORAX DX C+: CPT

## 2019-09-09 RX ADMIN — IOPAMIDOL 85 ML: 612 INJECTION, SOLUTION INTRAVENOUS at 08:22

## 2019-10-03 ENCOUNTER — TELEPHONE (OUTPATIENT)
Dept: CARDIOLOGY | Facility: CLINIC | Age: 64
End: 2019-10-03

## 2019-10-03 RX ORDER — ATORVASTATIN CALCIUM 20 MG/1
20 TABLET, FILM COATED ORAL DAILY
Qty: 90 TABLET | Refills: 0 | Status: SHIPPED | OUTPATIENT
Start: 2019-10-03 | End: 2020-01-08

## 2019-10-09 NOTE — TELEPHONE ENCOUNTER
The only appts that are available for MKD in Dec 2019 are hospital follow ups and same day appts.  Do I need to schedule for Mar/April and waitlist?

## 2019-11-06 DIAGNOSIS — I10 ESSENTIAL HYPERTENSION: ICD-10-CM

## 2019-11-06 DIAGNOSIS — R73.09 ELEVATED GLUCOSE: Primary | ICD-10-CM

## 2019-11-06 DIAGNOSIS — E78.5 HYPERLIPIDEMIA, UNSPECIFIED HYPERLIPIDEMIA TYPE: ICD-10-CM

## 2019-11-07 LAB
ALBUMIN SERPL-MCNC: 5.3 G/DL (ref 3.5–5.2)
ALBUMIN/GLOB SERPL: 2.4 G/DL
ALP SERPL-CCNC: 112 U/L (ref 39–117)
ALT SERPL-CCNC: 19 U/L (ref 1–41)
AST SERPL-CCNC: 16 U/L (ref 1–40)
BILIRUB SERPL-MCNC: 0.5 MG/DL (ref 0.2–1.2)
BUN SERPL-MCNC: 21 MG/DL (ref 8–23)
BUN/CREAT SERPL: 25.3 (ref 7–25)
CALCIUM SERPL-MCNC: 9.8 MG/DL (ref 8.6–10.5)
CHLORIDE SERPL-SCNC: 99 MMOL/L (ref 98–107)
CHOLEST SERPL-MCNC: 180 MG/DL (ref 0–200)
CO2 SERPL-SCNC: 29.3 MMOL/L (ref 22–29)
CREAT SERPL-MCNC: 0.83 MG/DL (ref 0.76–1.27)
GLOBULIN SER CALC-MCNC: 2.2 GM/DL
GLUCOSE SERPL-MCNC: 99 MG/DL (ref 65–99)
HBA1C MFR BLD: 5.9 % (ref 4.8–5.6)
HDLC SERPL-MCNC: 58 MG/DL (ref 40–60)
LDLC SERPL CALC-MCNC: 95 MG/DL (ref 0–100)
LDLC/HDLC SERPL: 1.64 {RATIO}
POTASSIUM SERPL-SCNC: 4.8 MMOL/L (ref 3.5–5.2)
PROT SERPL-MCNC: 7.5 G/DL (ref 6–8.5)
SODIUM SERPL-SCNC: 141 MMOL/L (ref 136–145)
TRIGL SERPL-MCNC: 133 MG/DL (ref 0–150)
VLDLC SERPL CALC-MCNC: 26.6 MG/DL

## 2019-11-08 ENCOUNTER — OFFICE VISIT (OUTPATIENT)
Dept: FAMILY MEDICINE CLINIC | Facility: CLINIC | Age: 64
End: 2019-11-08

## 2019-11-08 VITALS
OXYGEN SATURATION: 95 % | HEART RATE: 74 BPM | SYSTOLIC BLOOD PRESSURE: 142 MMHG | HEIGHT: 67 IN | WEIGHT: 248.2 LBS | BODY MASS INDEX: 38.96 KG/M2 | DIASTOLIC BLOOD PRESSURE: 71 MMHG

## 2019-11-08 DIAGNOSIS — I25.10 CORONARY ARTERY DISEASE INVOLVING NATIVE HEART WITHOUT ANGINA PECTORIS, UNSPECIFIED VESSEL OR LESION TYPE: Primary | ICD-10-CM

## 2019-11-08 DIAGNOSIS — E66.01 CLASS 3 SEVERE OBESITY DUE TO EXCESS CALORIES WITHOUT SERIOUS COMORBIDITY WITH BODY MASS INDEX (BMI) OF 40.0 TO 44.9 IN ADULT (HCC): ICD-10-CM

## 2019-11-08 DIAGNOSIS — I10 ESSENTIAL HYPERTENSION: ICD-10-CM

## 2019-11-08 DIAGNOSIS — E78.5 HYPERLIPIDEMIA, UNSPECIFIED HYPERLIPIDEMIA TYPE: ICD-10-CM

## 2019-11-08 PROCEDURE — 99214 OFFICE O/P EST MOD 30 MIN: CPT | Performed by: INTERNAL MEDICINE

## 2019-11-08 RX ORDER — ZOLPIDEM TARTRATE 10 MG/1
TABLET ORAL
Refills: 0 | COMMUNITY
Start: 2019-11-04 | End: 2019-11-08 | Stop reason: SDUPTHER

## 2019-11-08 RX ORDER — ZOLPIDEM TARTRATE 10 MG/1
10 TABLET ORAL NIGHTLY PRN
Qty: 30 TABLET | Refills: 2 | Status: SHIPPED | OUTPATIENT
Start: 2019-11-08 | End: 2020-05-15 | Stop reason: SDUPTHER

## 2019-11-08 RX ORDER — INFLUENZA A VIRUS A/MICHIGAN/45/2015 X-275 (H1N1) ANTIGEN (FORMALDEHYDE INACTIVATED), INFLUENZA A VIRUS A/SINGAPORE/INFIMH-16-0019/2016 IVR-186 (H3N2) ANTIGEN (FORMALDEHYDE INACTIVATED), AND INFLUENZA B VIRUS B/MARYLAND/15/2016 BX-69A (A B/COLORADO/6/2017-LIKE VIRUS) ANTIGEN (FORMALDEHYDE INACTIVATED) 60; 60; 60 UG/.5ML; UG/.5ML; UG/.5ML
INJECTION, SUSPENSION INTRAMUSCULAR
Refills: 0 | COMMUNITY
Start: 2019-11-05 | End: 2019-11-08

## 2019-11-08 NOTE — PROGRESS NOTES
Louise Tatum is a 63 y.o. male. Patient is here today for follow-up on his hypertension, hyperlipidemia and history of coronary artery disease.  Patient also is obese.  He is generally feeling well and has no acute complaints.  He is not been taking atorvastatin daily but about 3 times a week.  Does have some insomnia that seems irregular and would like a refill on the Ambien.  He has had no chest pain, shortness of breath, edema or myalgias  Chief Complaint   Patient presents with   • Coronary Artery Disease     Follow Labs          Vitals:    11/08/19 0830   BP: 142/71   Pulse: 74   SpO2: 95%     Body mass index is 38.87 kg/m².  The following portions of the patient's history were reviewed and updated as appropriate: allergies, current medications, past family history, past medical history, past social history, past surgical history and problem list.    Past Medical History:   Diagnosis Date   • Anxiety    • Coronary artery disease    • Depression    • Diverticulosis    • Erosive gastritis    • Family hx of colon cancer    • H/O CT scan of abdomen 01/29/2014    ddd, tics, tortuosity w/aortoiliac atherosclerosis, HH, cardiac enlargement   • Heart attack (CMS/HCC)    • Hepatitis     HEP C   • Hiatal hernia    • History of CT scan of abdomen    • Hyperlipidemia    • Hypertension    • Obese    • Thoracic aortic aneurysm without rupture (CMS/HCC)       No Known Allergies   Social History     Socioeconomic History   • Marital status:      Spouse name: Not on file   • Number of children: Not on file   • Years of education: Not on file   • Highest education level: Not on file   Occupational History   • 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   Tobacco Use   • Smoking status: Never Smoker   • Smokeless tobacco: Never Used   Substance and Sexual Activity   • Alcohol use: Yes     Alcohol/week: 1.2 oz     Types: 2 Shots of liquor per week     Comment: weekly   • Drug use: No   • Sexual activity:  Defer        Current Outpatient Medications:   •  aspirin 81 MG tablet, Take by mouth., Disp: , Rfl:   •  atorvastatin (LIPITOR) 20 MG tablet, TAKE 1 TABLET BY MOUTH DAILY, Disp: 90 tablet, Rfl: 0  •  lisinopril (PRINIVIL,ZESTRIL) 5 MG tablet, Take 1 tablet by mouth Daily., Disp: 90 tablet, Rfl: 1  •  pantoprazole (PROTONIX) 40 MG EC tablet, Take 1 tablet by mouth 2 (Two) Times a Day., Disp: 180 tablet, Rfl: 3  •  sucralfate (CARAFATE) 1 g tablet, Take 1 tablet by mouth 3 (Three) Times a Day., Disp: 90 tablet, Rfl: 11  •  zolpidem (AMBIEN) 10 MG tablet, Take 1 tablet by mouth At Night As Needed for Sleep., Disp: 30 tablet, Rfl: 2     Objective     History of Present Illness     Review of Systems   Constitutional: Negative.    HENT: Negative.    Respiratory: Negative.    Cardiovascular: Negative.    Gastrointestinal: Negative.    Genitourinary: Negative.    Musculoskeletal: Negative.    Skin: Negative.    Neurological: Negative.    Psychiatric/Behavioral: Negative.        Physical Exam   Constitutional: He is oriented to person, place, and time. He appears well-developed and well-nourished.   Pleasant, cooperative no acute distress blood pressure 130/80   HENT:   Head: Normocephalic and atraumatic.   Eyes: Conjunctivae are normal. Pupils are equal, round, and reactive to light. No scleral icterus.   Neck: Normal range of motion. Neck supple.   Cardiovascular: Normal rate, regular rhythm and normal heart sounds.   Pulmonary/Chest: Effort normal and breath sounds normal. No respiratory distress. He has no wheezes. He has no rales.   Musculoskeletal: Normal range of motion. He exhibits no edema.   Neurological: He is alert and oriented to person, place, and time.   Skin: Skin is warm and dry.   Psychiatric: He has a normal mood and affect. His behavior is normal.   Nursing note and vitals reviewed.      ASSESSMENT CMP was essentially normal with a sugar of 99 and hemoglobin A1c is minimally high at 5.9.  Lipid panel is  not optimal with total cholesterol 180, HDL of 58 and LDL of 95.  #1-hypertension controlled  #2-hyperlipidemia, not optimally controlled   #3-history of coronary artery disease, asymptomatic  #4-obesity with no significant weight loss     Problem List Items Addressed This Visit        Cardiovascular and Mediastinum    Hyperlipidemia    Hypertension    Coronary artery disease - Primary       Digestive    Adiposity          PLAN I recommended to the patient that he take his atorvastatin daily and continue on current medicines as now.  I would like him to watch dietary carbs and lose weight.  Recommended the shingles immunizations.  I would like to recheck him in 6 months with a CBC, CMP, lipid panel, PSA, hemoglobin A1c and TSH    There are no Patient Instructions on file for this visit.  Return in about 6 months (around 5/8/2020) for with labs.

## 2019-12-30 ENCOUNTER — TELEPHONE (OUTPATIENT)
Dept: FAMILY MEDICINE CLINIC | Facility: CLINIC | Age: 64
End: 2019-12-30

## 2019-12-30 ENCOUNTER — OFFICE VISIT (OUTPATIENT)
Dept: FAMILY MEDICINE CLINIC | Facility: CLINIC | Age: 64
End: 2019-12-30

## 2019-12-30 VITALS
RESPIRATION RATE: 16 BRPM | BODY MASS INDEX: 39.01 KG/M2 | HEART RATE: 62 BPM | HEIGHT: 68 IN | TEMPERATURE: 98.4 F | WEIGHT: 257.4 LBS | OXYGEN SATURATION: 94 % | DIASTOLIC BLOOD PRESSURE: 78 MMHG | SYSTOLIC BLOOD PRESSURE: 148 MMHG

## 2019-12-30 DIAGNOSIS — H65.02 ACUTE SEROUS OTITIS MEDIA OF LEFT EAR, RECURRENCE NOT SPECIFIED: ICD-10-CM

## 2019-12-30 DIAGNOSIS — R05.9 COUGH: ICD-10-CM

## 2019-12-30 DIAGNOSIS — H65.111 ACUTE MUCOID OTITIS MEDIA OF RIGHT EAR: Primary | ICD-10-CM

## 2019-12-30 PROCEDURE — 99213 OFFICE O/P EST LOW 20 MIN: CPT | Performed by: NURSE PRACTITIONER

## 2019-12-30 RX ORDER — AZITHROMYCIN 250 MG/1
TABLET, FILM COATED ORAL
Qty: 6 TABLET | Refills: 0 | Status: SHIPPED | OUTPATIENT
Start: 2019-12-30 | End: 2020-05-15

## 2019-12-30 RX ORDER — BENZONATATE 100 MG/1
100 CAPSULE ORAL 3 TIMES DAILY PRN
Qty: 20 CAPSULE | Refills: 0 | Status: SHIPPED | OUTPATIENT
Start: 2019-12-30 | End: 2020-05-15

## 2019-12-30 NOTE — TELEPHONE ENCOUNTER
CALLED AND S/W PT AND ADVISED WHAT JOSELYN SAID. PT VOICED UNDERSTANDING.     ----- Message from EVI Banuelos sent at 12/30/2019 11:04 AM EST -----  Contact: PATIENT  No. Do not continue the Amoxicillin that he is using from a previous script. Please only use the azithromycin prescribed today.  Thanks   joselyn  ----- Message -----  From: Sonia Gutierrez MA  Sent: 12/30/2019  10:22 AM EST  To: EVI Banuelos,    PATIENT FORGOT TO ASK YOU AT HIS VISIT TODAY IF HE SHOULD CONTINUE TAKING THE ANTIBIOTIC HE IS ON NOW AND THEN START THE ONE YOU GAVE HIM WHEN HE'S FINISHED WITH THE OTHER ONE, OR D/C THE ONE HE IS ON NOW AND START THE ZPAK YOU GAVE HIM TODAY? PLEASE ADVISE. THANK YOU.

## 2019-12-30 NOTE — PROGRESS NOTES
Louise Tatum is a 64 y.o.. male.     Cough   This is a new problem. Episode onset: 10 days. The problem has been unchanged. The cough is non-productive. Associated symptoms include headaches, rhinorrhea and a sore throat (itchy). Pertinent negatives include no ear pain or fever. Treatments tried: mucinex, hydrocodone cough syrup, started amoxicillin 2 doses yesterday. There is no history of asthma, COPD or emphysema.       The following portions of the patient's history were reviewed and updated as appropriate: allergies, current medications, past family history, past medical history, past social history, past surgical history and problem list.    Past Medical History:   Diagnosis Date   • Anxiety    • Coronary artery disease    • Depression    • Diverticulosis    • Erosive gastritis    • Family hx of colon cancer    • H/O CT scan of abdomen 01/29/2014    ddd, tics, tortuosity w/aortoiliac atherosclerosis, HH, cardiac enlargement   • Heart attack (CMS/HCC)    • Hepatitis     HEP C   • Hiatal hernia    • History of CT scan of abdomen    • Hyperlipidemia    • Hypertension    • Obese    • Thoracic aortic aneurysm without rupture (CMS/HCC)        Past Surgical History:   Procedure Laterality Date   • CERVICAL DISCECTOMY ANTERIOR      anterior spinal, osteophytectomy cerv interspace   • CERVICAL DISCECTOMY ANTERIOR  12/07/2015    spinal; C5-C6 and C6-C7 anterior cervical discectomy and arthrodesis; Jon Lua   • CHOLECYSTECTOMY     • COLONOSCOPY  06/10/2016    himanshu Sterling M.D.   • CORONARY ANGIOPLASTY WITH STENT PLACEMENT     • CORONARY ANGIOPLASTY WITH STENT PLACEMENT N/A     10yrs ago with Dr Ruff at [facility]; debatable at whether he had an MI that admission   • ENDOSCOPY N/A 12/28/2016    Medium sized HH, gastritis.     • GALLBLADDER SURGERY     • UPPER GASTROINTESTINAL ENDOSCOPY  02/12/2014    LA Grade A reflux esophagitis, HH, erosive gtastritis, bx       Review of Systems  "  Constitutional: Negative for fever.   HENT: Positive for congestion, rhinorrhea and sore throat (itchy). Negative for ear pain.    Respiratory: Positive for cough (productive at begining but now dry; throught out day).    Gastrointestinal: Negative for diarrhea, nausea and vomiting.   Neurological: Positive for headaches.       No Known Allergies    Objective     Vitals:    12/30/19 0916   BP: 148/78   Pulse: 62   Resp: 16   Temp: 98.4 °F (36.9 °C)   SpO2: 94%   Weight: 117 kg (257 lb 6.4 oz)   Height: 172.7 cm (68\")     Body mass index is 39.14 kg/m².    Physical Exam   Constitutional: He is oriented to person, place, and time. He appears well-developed and well-nourished.   HENT:   Head: Normocephalic and atraumatic.   Right Ear: Tympanic membrane is erythematous. A middle ear effusion (yellow, hazy) is present.   Left Ear: Tympanic membrane is not erythematous. A middle ear effusion (clear) is present.   Nose: Mucosal edema present. Right sinus exhibits no maxillary sinus tenderness and no frontal sinus tenderness. Left sinus exhibits no maxillary sinus tenderness and no frontal sinus tenderness.   Mouth/Throat: Oropharynx is clear and moist. Oropharyngeal exudate: pnd.   Eyes: Pupils are equal, round, and reactive to light. Conjunctivae are normal.   Cardiovascular: Normal rate and regular rhythm.   No murmur heard.  Pulmonary/Chest: Effort normal. No accessory muscle usage or stridor. No respiratory distress. He has no decreased breath sounds. He has no wheezes. He has no rhonchi. He has no rales.   Musculoskeletal: Normal range of motion.   Lymphadenopathy:     He has cervical adenopathy.   Neurological: He is alert and oriented to person, place, and time.   Skin: Skin is warm and dry.   Vitals reviewed.        Current Outpatient Medications:   •  aspirin 81 MG tablet, Take by mouth., Disp: , Rfl:   •  atorvastatin (LIPITOR) 20 MG tablet, TAKE 1 TABLET BY MOUTH DAILY, Disp: 90 tablet, Rfl: 0  •  lisinopril " (PRINIVIL,ZESTRIL) 5 MG tablet, Take 1 tablet by mouth Daily., Disp: 90 tablet, Rfl: 1  •  pantoprazole (PROTONIX) 40 MG EC tablet, Take 1 tablet by mouth 2 (Two) Times a Day., Disp: 180 tablet, Rfl: 3  •  sucralfate (CARAFATE) 1 g tablet, Take 1 tablet by mouth 3 (Three) Times a Day., Disp: 90 tablet, Rfl: 11  •  zolpidem (AMBIEN) 10 MG tablet, Take 1 tablet by mouth At Night As Needed for Sleep., Disp: 30 tablet, Rfl: 2  •  azithromycin (ZITHROMAX) 250 MG tablet, Take 2 tablets the first day, then 1 tablet daily for 4 days., Disp: 6 tablet, Rfl: 0  •  benzonatate (TESSALON PERLES) 100 MG capsule, Take 1 capsule by mouth 3 (Three) Times a Day As Needed for Cough., Disp: 20 capsule, Rfl: 0      Assessment/Plan   Antony was seen today for cough.    Diagnoses and all orders for this visit:    Acute mucoid otitis media of right ear  -     azithromycin (ZITHROMAX) 250 MG tablet; Take 2 tablets the first day, then 1 tablet daily for 4 days.    Acute serous otitis media of left ear, recurrence not specified    Cough  -     benzonatate (TESSALON PERLES) 100 MG capsule; Take 1 capsule by mouth 3 (Three) Times a Day As Needed for Cough.    Pt declined oral steroids (prednisone).    Patient Instructions   Drink plenty of fluids, rest, avoid dairy, warm salt water gargles      Return if symptoms worsen or fail to improve.     Lab: 282

## 2020-01-07 ENCOUNTER — OFFICE VISIT (OUTPATIENT)
Dept: ORTHOPEDIC SURGERY | Facility: CLINIC | Age: 65
End: 2020-01-07

## 2020-01-07 VITALS — WEIGHT: 256 LBS | HEIGHT: 68 IN | TEMPERATURE: 98 F | BODY MASS INDEX: 38.8 KG/M2

## 2020-01-07 DIAGNOSIS — G89.29 CHRONIC PAIN OF LEFT KNEE: Primary | ICD-10-CM

## 2020-01-07 DIAGNOSIS — M25.562 CHRONIC PAIN OF LEFT KNEE: Primary | ICD-10-CM

## 2020-01-07 PROCEDURE — 73562 X-RAY EXAM OF KNEE 3: CPT | Performed by: ORTHOPAEDIC SURGERY

## 2020-01-07 PROCEDURE — 99203 OFFICE O/P NEW LOW 30 MIN: CPT | Performed by: ORTHOPAEDIC SURGERY

## 2020-01-07 NOTE — PROGRESS NOTES
Patient: Antony Tatum  YOB: 1955 64 y.o. male  Medical Record Number: [id number]    Chief Complaints:   Chief Complaint   Patient presents with   • Left Knee - Establish Care       History of Present Illness:Antony Tatum is a 64 y.o. male who presents with left knee pain.  He has a moderate medial ache about the left knee which is been present for the past 6 months or so.  He states his wife made him come.  He was overall doing reasonably well until a few months ago when the pain started to irritate him it is a mild ache he rates as 3 on a scale of 10.  He states that he did have a knee arthroscopy and medial meniscectomy on the same knee about 15 or 20 years ago.    Allergies: Not on File    Medications:   Current Outpatient Medications   Medication Sig Dispense Refill   • aspirin 81 MG tablet Take by mouth.     • atorvastatin (LIPITOR) 20 MG tablet TAKE 1 TABLET BY MOUTH DAILY 90 tablet 0   • lisinopril (PRINIVIL,ZESTRIL) 5 MG tablet Take 1 tablet by mouth Daily. 90 tablet 1   • pantoprazole (PROTONIX) 40 MG EC tablet Take 1 tablet by mouth 2 (Two) Times a Day. 180 tablet 3   • sucralfate (CARAFATE) 1 g tablet Take 1 tablet by mouth 3 (Three) Times a Day. 90 tablet 11   • azithromycin (ZITHROMAX) 250 MG tablet Take 2 tablets the first day, then 1 tablet daily for 4 days. 6 tablet 0   • benzonatate (TESSALON PERLES) 100 MG capsule Take 1 capsule by mouth 3 (Three) Times a Day As Needed for Cough. 20 capsule 0   • zolpidem (AMBIEN) 10 MG tablet Take 1 tablet by mouth At Night As Needed for Sleep. 30 tablet 2     No current facility-administered medications for this visit.          The following portions of the patient's history were reviewed and updated as appropriate: allergies, current medications, past family history, past medical history, past social history, past surgical history and problem list.    Review of Systems:   A 14 point review of systems was performed. All systems negative except  "pertinent positives/negative listed in HPI above    Physical Exam:   Vitals:    01/07/20 1524   Temp: 98 °F (36.7 °C)   TempSrc: Temporal   Weight: 116 kg (256 lb)   Height: 172.7 cm (68\")       General: A and O x 3, ASA, NAD    SCLERA:    Normal    DENTITION:   Normal     Knee:  left    ALIGNMENT:     Varus  ,   Patella  tracks  midline    GAIT:    Antalgic    SKIN:    No abnormality    RANGE OF MOTION:   3  -  125   DEG    STRENGTH:   4  / 5    LIGAMENTS:    No varus / valgus instability.   Negative  Lachman.    MENISCUS:     Negative   Matt       DISTAL PULSES:    Paplable    DISTAL SENSATION :   Intact    LYMPHATICS:     No   lymphadenopathy    OTHER:          - Positive   effusion      - Crepitance with ROM       Radiology:  Xrays 3views LEFT KNEE (ap,lateral, sunrise) were ordered and reviewed for evaluation of knee pain demonstrating advanced varus osteoarthritis with near bone on bone articulation, subchondral cysts, and periarticular osteophytes. There are no previous films for comparision.    Assessment/Plan: Left knee advanced osteoarthritis he wants to start with conservative measures which I think is reasonable.  I gave him a lateral heel wedge I will send him to physical therapy for quad strengthening I recommended Advil or Aleve as needed for discomfort.  Neck step would be cortisone injection he will call back if his pain worsens to that point.      Scott Browning MD  1/7/2020  "

## 2020-01-08 RX ORDER — ATORVASTATIN CALCIUM 20 MG/1
20 TABLET, FILM COATED ORAL DAILY
Qty: 90 TABLET | Refills: 0 | Status: SHIPPED | OUTPATIENT
Start: 2020-01-08 | End: 2020-06-18 | Stop reason: SDUPTHER

## 2020-03-27 ENCOUNTER — TELEPHONE (OUTPATIENT)
Dept: GASTROENTEROLOGY | Facility: CLINIC | Age: 65
End: 2020-03-27

## 2020-03-27 RX ORDER — SUCRALFATE 1 G/1
1 TABLET ORAL
Qty: 120 TABLET | Refills: 3 | Status: SHIPPED | OUTPATIENT
Start: 2020-03-27 | End: 2021-05-04

## 2020-03-27 NOTE — TELEPHONE ENCOUNTER
----- Message from Donovan Rocha sent at 3/27/2020  3:06 PM EDT -----  Regarding: sucralfate (CARAFATE) 1 g  Contact: [phone]  Pt needs a prescription called into [facility] [phone].

## 2020-03-27 NOTE — TELEPHONE ENCOUNTER
Called pt and left vm for pt to call back.     Message sent to Tanvi BAUTISTA regarding refilling of carafate.

## 2020-04-20 RX ORDER — LISINOPRIL 5 MG/1
5 TABLET ORAL DAILY
Qty: 90 TABLET | Refills: 1 | Status: SHIPPED | OUTPATIENT
Start: 2020-04-20 | End: 2021-01-27 | Stop reason: SDUPTHER

## 2020-05-15 ENCOUNTER — OFFICE VISIT (OUTPATIENT)
Dept: FAMILY MEDICINE CLINIC | Facility: CLINIC | Age: 65
End: 2020-05-15

## 2020-05-15 VITALS
RESPIRATION RATE: 18 BRPM | OXYGEN SATURATION: 94 % | WEIGHT: 263.2 LBS | HEART RATE: 68 BPM | DIASTOLIC BLOOD PRESSURE: 80 MMHG | HEIGHT: 68 IN | SYSTOLIC BLOOD PRESSURE: 124 MMHG | BODY MASS INDEX: 39.89 KG/M2 | TEMPERATURE: 97.8 F

## 2020-05-15 DIAGNOSIS — G47.09 OTHER INSOMNIA: ICD-10-CM

## 2020-05-15 DIAGNOSIS — I25.10 CORONARY ARTERY DISEASE INVOLVING NATIVE HEART WITHOUT ANGINA PECTORIS, UNSPECIFIED VESSEL OR LESION TYPE: Primary | ICD-10-CM

## 2020-05-15 DIAGNOSIS — E66.01 CLASS 3 SEVERE OBESITY DUE TO EXCESS CALORIES WITHOUT SERIOUS COMORBIDITY WITH BODY MASS INDEX (BMI) OF 40.0 TO 44.9 IN ADULT (HCC): ICD-10-CM

## 2020-05-15 DIAGNOSIS — R73.9 HYPERGLYCEMIA: ICD-10-CM

## 2020-05-15 DIAGNOSIS — R39.14 FEELING OF INCOMPLETE BLADDER EMPTYING: ICD-10-CM

## 2020-05-15 DIAGNOSIS — E78.5 HYPERLIPIDEMIA, UNSPECIFIED HYPERLIPIDEMIA TYPE: ICD-10-CM

## 2020-05-15 DIAGNOSIS — K21.9 GASTROESOPHAGEAL REFLUX DISEASE, ESOPHAGITIS PRESENCE NOT SPECIFIED: ICD-10-CM

## 2020-05-15 DIAGNOSIS — Z12.5 SCREENING PSA (PROSTATE SPECIFIC ANTIGEN): ICD-10-CM

## 2020-05-15 DIAGNOSIS — I10 ESSENTIAL HYPERTENSION: ICD-10-CM

## 2020-05-15 PROCEDURE — 90732 PPSV23 VACC 2 YRS+ SUBQ/IM: CPT | Performed by: INTERNAL MEDICINE

## 2020-05-15 PROCEDURE — G0009 ADMIN PNEUMOCOCCAL VACCINE: HCPCS | Performed by: INTERNAL MEDICINE

## 2020-05-15 PROCEDURE — 99214 OFFICE O/P EST MOD 30 MIN: CPT | Performed by: INTERNAL MEDICINE

## 2020-05-15 RX ORDER — ZOLPIDEM TARTRATE 10 MG/1
10 TABLET ORAL NIGHTLY PRN
Qty: 30 TABLET | Refills: 2 | Status: SHIPPED | OUTPATIENT
Start: 2020-05-15 | End: 2021-05-18

## 2020-05-15 NOTE — PROGRESS NOTES
Subjective   Antony Tatum is a 64 y.o. male. Patient is here today for follow-up on his hypertension, hyperlipidemia, history of coronary artery disease, GERD, hyperglycemia, obesity and insomnia.  He is generally feeling well and has no acute complaints.  He has had no chest pain, shortness of breath, edema or myalgias.  He is working from home and unfortunately has gained some weight.  Chief Complaint   Patient presents with   • Hypertension     HLD- PT HERE FOR MED CHECK AND HAVE LABS          Vitals:    05/15/20 0807   BP: 124/80   Pulse: 68   Resp: 18   Temp: 97.8 °F (36.6 °C)   SpO2: 94%     Body mass index is 40.03 kg/m².  The following portions of the patient's history were reviewed and updated as appropriate: allergies, current medications, past family history, past medical history, past social history, past surgical history and problem list.    Past Medical History:   Diagnosis Date   • Anxiety    • Coronary artery disease    • Depression    • Diverticulosis    • Erosive gastritis    • Family hx of colon cancer    • H/O CT scan of abdomen 01/29/2014    ddd, tics, tortuosity w/aortoiliac atherosclerosis, HH, cardiac enlargement   • Heart attack (CMS/HCC)    • Hepatitis     HEP C   • Hiatal hernia    • History of CT scan of abdomen    • Hyperlipidemia    • Hypertension    • Obese    • Thoracic aortic aneurysm without rupture (CMS/HCC)       No Known Allergies   Social History     Socioeconomic History   • Marital status:      Spouse name: Not on file   • Number of children: Not on file   • Years of education: Not on file   • Highest education level: Not on file   Occupational History   • 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   Tobacco Use   • Smoking status: Never Smoker   • Smokeless tobacco: Never Used   Substance and Sexual Activity   • Alcohol use: Yes     Alcohol/week: 2.0 standard drinks     Types: 2 Shots of liquor per week     Comment: weekly   • Drug use: No   • Sexual activity: Defer         Current Outpatient Medications:   •  aspirin 81 MG tablet, Take by mouth., Disp: , Rfl:   •  atorvastatin (LIPITOR) 20 MG tablet, TAKE 1 TABLET BY MOUTH DAILY, Disp: 90 tablet, Rfl: 0  •  lisinopril (PRINIVIL,ZESTRIL) 5 MG tablet, Take 1 tablet by mouth Daily., Disp: 90 tablet, Rfl: 1  •  pantoprazole (PROTONIX) 40 MG EC tablet, Take 1 tablet by mouth 2 (Two) Times a Day., Disp: 180 tablet, Rfl: 3  •  sucralfate (CARAFATE) 1 g tablet, Take 1 tablet by mouth 4 (Four) Times a Day Before Meals & at Bedtime As Needed (abdominal pain)., Disp: 120 tablet, Rfl: 3  •  zolpidem (AMBIEN) 10 MG tablet, Take 1 tablet by mouth At Night As Needed for Sleep., Disp: 30 tablet, Rfl: 2     Objective     History of Present Illness     Review of Systems   Constitutional: Negative.    HENT: Negative.    Respiratory: Negative.    Cardiovascular: Negative.    Gastrointestinal: Negative.    Genitourinary: Negative.    Musculoskeletal: Negative.    Skin: Negative.    Neurological: Negative.    Psychiatric/Behavioral: Negative.        Physical Exam   Constitutional: He is oriented to person, place, and time. He appears well-developed and well-nourished.   Pleasant, cooperative no distress, blood pressure 130/75   HENT:   Head: Normocephalic and atraumatic.   Eyes: Conjunctivae are normal. No scleral icterus.   Neck: Normal range of motion. Neck supple.   Cardiovascular: Normal rate, regular rhythm and normal heart sounds.   Pulmonary/Chest: Effort normal and breath sounds normal. No respiratory distress. He has no wheezes. He has no rales.   Musculoskeletal: Normal range of motion. He exhibits no edema.   Neurological: He is alert and oriented to person, place, and time.   Skin: Skin is warm and dry.   Psychiatric: He has a normal mood and affect. His behavior is normal.   Nursing note and vitals reviewed.      ASSESSMENT overall the patient seems stable.  #1-hypertension, controlled  #2-hyperlipidemia, asymptomatic  #3-hyperglycemia,  asymptomatic  #4-coronary artery disease with no recent chest pains  #5-obesity with unfortunately some weight gain  #6-insomnia, controlled on medication     Problem List Items Addressed This Visit        Cardiovascular and Mediastinum    Hyperlipidemia    Relevant Orders    Comprehensive Metabolic Panel    Lipid Panel With LDL / HDL Ratio    Hypertension    Relevant Orders    CBC & Differential    PSA DIAGNOSTIC    TSH    Coronary artery disease - Primary    Relevant Orders    CBC & Differential    TSH       Digestive    Adiposity    Relevant Orders    PSA DIAGNOSTIC    GERD (gastroesophageal reflux disease)       Other    Other insomnia    Relevant Medications    zolpidem (AMBIEN) 10 MG tablet    Other Relevant Orders    TSH    Hyperglycemia    Relevant Orders    Comprehensive Metabolic Panel    Hemoglobin A1c      Other Visit Diagnoses     Screening PSA (prostate specific antigen)        Feeling of incomplete bladder emptying         Relevant Orders    PSA DIAGNOSTIC          PLAN the patient received a Pneumovax 23 immunization today.  I have ordered laboratory studies to be drawn today and refilled his opiate him.  Presuming his laboratory studies are stable, I will recheck him in 6 months with a CMP, lipid panel, hemoglobin A1c    There are no Patient Instructions on file for this visit.  Return in about 6 months (around 11/15/2020) for with labs.

## 2020-05-16 LAB
ALBUMIN SERPL-MCNC: 4.9 G/DL (ref 3.5–5.2)
ALBUMIN/GLOB SERPL: 2.2 G/DL
ALP SERPL-CCNC: 125 U/L (ref 39–117)
ALT SERPL-CCNC: 24 U/L (ref 1–41)
AST SERPL-CCNC: 18 U/L (ref 1–40)
BASOPHILS # BLD AUTO: 0.05 10*3/MM3 (ref 0–0.2)
BASOPHILS NFR BLD AUTO: 0.4 % (ref 0–1.5)
BILIRUB SERPL-MCNC: 1 MG/DL (ref 0.2–1.2)
BUN SERPL-MCNC: 18 MG/DL (ref 8–23)
BUN/CREAT SERPL: 21.7 (ref 7–25)
CALCIUM SERPL-MCNC: 9.7 MG/DL (ref 8.6–10.5)
CHLORIDE SERPL-SCNC: 98 MMOL/L (ref 98–107)
CHOLEST SERPL-MCNC: 149 MG/DL (ref 0–200)
CO2 SERPL-SCNC: 29.7 MMOL/L (ref 22–29)
CREAT SERPL-MCNC: 0.83 MG/DL (ref 0.76–1.27)
EOSINOPHIL # BLD AUTO: 0.34 10*3/MM3 (ref 0–0.4)
EOSINOPHIL NFR BLD AUTO: 2.9 % (ref 0.3–6.2)
ERYTHROCYTE [DISTWIDTH] IN BLOOD BY AUTOMATED COUNT: 12.9 % (ref 12.3–15.4)
GLOBULIN SER CALC-MCNC: 2.2 GM/DL
GLUCOSE SERPL-MCNC: 100 MG/DL (ref 65–99)
HBA1C MFR BLD: 6 % (ref 4.8–5.6)
HCT VFR BLD AUTO: 41.7 % (ref 37.5–51)
HDLC SERPL-MCNC: 53 MG/DL (ref 40–60)
HGB BLD-MCNC: 14 G/DL (ref 13–17.7)
IMM GRANULOCYTES # BLD AUTO: 0.04 10*3/MM3 (ref 0–0.05)
IMM GRANULOCYTES NFR BLD AUTO: 0.3 % (ref 0–0.5)
LDLC SERPL CALC-MCNC: 76 MG/DL (ref 0–100)
LDLC/HDLC SERPL: 1.43 {RATIO}
LYMPHOCYTES # BLD AUTO: 1.77 10*3/MM3 (ref 0.7–3.1)
LYMPHOCYTES NFR BLD AUTO: 15.3 % (ref 19.6–45.3)
MCH RBC QN AUTO: 30.2 PG (ref 26.6–33)
MCHC RBC AUTO-ENTMCNC: 33.6 G/DL (ref 31.5–35.7)
MCV RBC AUTO: 90.1 FL (ref 79–97)
MONOCYTES # BLD AUTO: 1.14 10*3/MM3 (ref 0.1–0.9)
MONOCYTES NFR BLD AUTO: 9.9 % (ref 5–12)
NEUTROPHILS # BLD AUTO: 8.2 10*3/MM3 (ref 1.7–7)
NEUTROPHILS NFR BLD AUTO: 71.2 % (ref 42.7–76)
NRBC BLD AUTO-RTO: 0 /100 WBC (ref 0–0.2)
PLATELET # BLD AUTO: 253 10*3/MM3 (ref 140–450)
POTASSIUM SERPL-SCNC: 4.9 MMOL/L (ref 3.5–5.2)
PROT SERPL-MCNC: 7.1 G/DL (ref 6–8.5)
PSA SERPL-MCNC: 0.81 NG/ML (ref 0–4)
RBC # BLD AUTO: 4.63 10*6/MM3 (ref 4.14–5.8)
SODIUM SERPL-SCNC: 138 MMOL/L (ref 136–145)
TRIGL SERPL-MCNC: 101 MG/DL (ref 0–150)
TSH SERPL DL<=0.005 MIU/L-ACNC: 2.86 UIU/ML (ref 0.27–4.2)
VLDLC SERPL CALC-MCNC: 20.2 MG/DL
WBC # BLD AUTO: 11.54 10*3/MM3 (ref 3.4–10.8)

## 2020-06-18 ENCOUNTER — TELEPHONE (OUTPATIENT)
Dept: FAMILY MEDICINE CLINIC | Facility: CLINIC | Age: 65
End: 2020-06-18

## 2020-06-18 RX ORDER — ATORVASTATIN CALCIUM 20 MG/1
20 TABLET, FILM COATED ORAL DAILY
Qty: 90 TABLET | Refills: 1 | Status: SHIPPED | OUTPATIENT
Start: 2020-06-18 | End: 2021-01-05

## 2020-06-18 NOTE — TELEPHONE ENCOUNTER
Caller: Antony Tatum    Relationship: Self    Best call back number: [phone]    Medication needed:   Requested Prescriptions     Pending Prescriptions Disp Refills   • atorvastatin (LIPITOR) 20 MG tablet 90 tablet 0     Sig: Take 1 tablet by mouth Daily.       When do you need the refill by: 6/21/20    What details did the patient provide when requesting the medication: THE PATIENT STATED THAT HE CURRENTLY HAS 3 TABLETS LEFT OF THIS MEDICATION.    Does the patient have less than a 3 day supply:  [] Yes  [x] No    What is the patient's preferred pharmacy:    [facility]/pharmacy #[id number] [place], [place] - [address] [facility]. AT [facility] [phone] [facility] [phone]

## 2021-01-05 RX ORDER — ATORVASTATIN CALCIUM 20 MG/1
TABLET, FILM COATED ORAL
Qty: 30 TABLET | Refills: 1 | Status: SHIPPED | OUTPATIENT
Start: 2021-01-05 | End: 2021-01-14 | Stop reason: SDUPTHER

## 2021-01-14 NOTE — TELEPHONE ENCOUNTER
Pt scheduled an appt for 1/27, he will be out of [employer] by then. Can you ok a refill to get him through until his appointment?

## 2021-01-15 RX ORDER — ATORVASTATIN CALCIUM 20 MG/1
20 TABLET, FILM COATED ORAL DAILY
Qty: 30 TABLET | Refills: 0 | Status: SHIPPED | OUTPATIENT
Start: 2021-01-15 | End: 2021-01-27 | Stop reason: SDUPTHER

## 2021-01-18 DIAGNOSIS — E78.5 HYPERLIPIDEMIA, UNSPECIFIED HYPERLIPIDEMIA TYPE: ICD-10-CM

## 2021-01-18 DIAGNOSIS — R73.9 HYPERGLYCEMIA: Primary | ICD-10-CM

## 2021-01-20 LAB
ALBUMIN SERPL-MCNC: 4.8 G/DL (ref 3.5–5.2)
ALBUMIN/GLOB SERPL: 2.3 G/DL
ALP SERPL-CCNC: 108 U/L (ref 39–117)
ALT SERPL-CCNC: 17 U/L (ref 1–41)
AST SERPL-CCNC: 16 U/L (ref 1–40)
BILIRUB SERPL-MCNC: 0.6 MG/DL (ref 0–1.2)
BUN SERPL-MCNC: 20 MG/DL (ref 8–23)
BUN/CREAT SERPL: 24.1 (ref 7–25)
CALCIUM SERPL-MCNC: 9.9 MG/DL (ref 8.6–10.5)
CHLORIDE SERPL-SCNC: 98 MMOL/L (ref 98–107)
CHOLEST SERPL-MCNC: 152 MG/DL (ref 0–200)
CO2 SERPL-SCNC: 30.6 MMOL/L (ref 22–29)
CREAT SERPL-MCNC: 0.83 MG/DL (ref 0.76–1.27)
GLOBULIN SER CALC-MCNC: 2.1 GM/DL
GLUCOSE SERPL-MCNC: 93 MG/DL (ref 65–99)
HBA1C MFR BLD: 6 % (ref 4.8–5.6)
HDLC SERPL-MCNC: 49 MG/DL (ref 40–60)
LDLC SERPL CALC-MCNC: 78 MG/DL (ref 0–100)
LDLC/HDLC SERPL: 1.51 {RATIO}
POTASSIUM SERPL-SCNC: 4.4 MMOL/L (ref 3.5–5.2)
PROT SERPL-MCNC: 6.9 G/DL (ref 6–8.5)
SODIUM SERPL-SCNC: 139 MMOL/L (ref 136–145)
TRIGL SERPL-MCNC: 146 MG/DL (ref 0–150)
VLDLC SERPL CALC-MCNC: 25 MG/DL (ref 5–40)

## 2021-01-27 ENCOUNTER — OFFICE VISIT (OUTPATIENT)
Dept: FAMILY MEDICINE CLINIC | Facility: CLINIC | Age: 66
End: 2021-01-27

## 2021-01-27 VITALS
RESPIRATION RATE: 16 BRPM | WEIGHT: 266.4 LBS | SYSTOLIC BLOOD PRESSURE: 122 MMHG | TEMPERATURE: 97.5 F | OXYGEN SATURATION: 98 % | BODY MASS INDEX: 40.38 KG/M2 | HEART RATE: 67 BPM | HEIGHT: 68 IN | DIASTOLIC BLOOD PRESSURE: 82 MMHG

## 2021-01-27 DIAGNOSIS — E78.5 HYPERLIPIDEMIA, UNSPECIFIED HYPERLIPIDEMIA TYPE: ICD-10-CM

## 2021-01-27 DIAGNOSIS — I10 ESSENTIAL HYPERTENSION: ICD-10-CM

## 2021-01-27 DIAGNOSIS — I25.10 CORONARY ARTERY DISEASE INVOLVING NATIVE HEART WITHOUT ANGINA PECTORIS, UNSPECIFIED VESSEL OR LESION TYPE: Primary | ICD-10-CM

## 2021-01-27 DIAGNOSIS — R73.9 HYPERGLYCEMIA: ICD-10-CM

## 2021-01-27 DIAGNOSIS — E66.01 CLASS 3 SEVERE OBESITY DUE TO EXCESS CALORIES WITHOUT SERIOUS COMORBIDITY WITH BODY MASS INDEX (BMI) OF 40.0 TO 44.9 IN ADULT (HCC): ICD-10-CM

## 2021-01-27 DIAGNOSIS — K21.9 GASTROESOPHAGEAL REFLUX DISEASE, UNSPECIFIED WHETHER ESOPHAGITIS PRESENT: ICD-10-CM

## 2021-01-27 PROCEDURE — 99214 OFFICE O/P EST MOD 30 MIN: CPT | Performed by: INTERNAL MEDICINE

## 2021-01-27 RX ORDER — ATORVASTATIN CALCIUM 20 MG/1
20 TABLET, FILM COATED ORAL DAILY
Qty: 90 TABLET | Refills: 3 | Status: SHIPPED | OUTPATIENT
Start: 2021-01-27 | End: 2022-03-17 | Stop reason: SDUPTHER

## 2021-01-27 RX ORDER — LISINOPRIL 5 MG/1
5 TABLET ORAL DAILY
Qty: 90 TABLET | Refills: 3 | Status: SHIPPED | OUTPATIENT
Start: 2021-01-27 | End: 2021-11-29

## 2021-01-27 NOTE — PROGRESS NOTES
Louise Tatum is a 65 y.o. male. Patient is here today for follow-up on his hypertension, hyperlipidemia, coronary artery disease, hyperglycemia, obesity and GERD.  Patient's generally been stable and has no acute complaints and has had no chest pain, shortness of breath, edema or myalgias  Chief Complaint   Patient presents with   • Hypertension     HLD, HYPERGLYCEMIA- FOLLOW UP LABS          Vitals:    01/27/21 0951   BP: 122/82   Pulse: 67   Resp: 16   Temp: 97.5 °F (36.4 °C)   SpO2: 98%     Body mass index is 40.52 kg/m².  The following portions of the patient's history were reviewed and updated as appropriate: allergies, current medications, past family history, past medical history, past social history, past surgical history and problem list.    Past Medical History:   Diagnosis Date   • Anxiety    • Coronary artery disease    • Depression    • Diverticulosis    • Erosive gastritis    • Family hx of colon cancer    • H/O CT scan of abdomen 01/29/2014    ddd, tics, tortuosity w/aortoiliac atherosclerosis, HH, cardiac enlargement   • Heart attack (CMS/HCC)    • Hepatitis     HEP C   • Hiatal hernia    • History of CT scan of abdomen    • Hyperlipidemia    • Hypertension    • Obese    • Thoracic aortic aneurysm without rupture (CMS/HCC)       No Known Allergies   Social History     Socioeconomic History   • Marital status:      Spouse name: Not on file   • Number of children: Not on file   • Years of education: Not on file   • Highest education level: Not on file   Occupational History   • 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   Tobacco Use   • Smoking status: Never Smoker   • Smokeless tobacco: Never Used   Substance and Sexual Activity   • Alcohol use: Yes     Alcohol/week: 2.0 standard drinks     Types: 2 Shots of liquor per week     Comment: weekly   • Drug use: No   • Sexual activity: Defer        Current Outpatient Medications:   •  aspirin 81 MG tablet, Take by mouth., Disp: ,  Rfl:   •  atorvastatin (LIPITOR) 20 MG tablet, Take 1 tablet by mouth Daily., Disp: 90 tablet, Rfl: 3  •  lisinopril (PRINIVIL,ZESTRIL) 5 MG tablet, Take 1 tablet by mouth Daily., Disp: 90 tablet, Rfl: 3  •  pantoprazole (PROTONIX) 40 MG EC tablet, Take 1 tablet by mouth 2 (Two) Times a Day., Disp: 180 tablet, Rfl: 3  •  sucralfate (CARAFATE) 1 g tablet, Take 1 tablet by mouth 4 (Four) Times a Day Before Meals & at Bedtime As Needed (abdominal pain)., Disp: 120 tablet, Rfl: 3  •  zolpidem (AMBIEN) 10 MG tablet, Take 1 tablet by mouth At Night As Needed for Sleep., Disp: 30 tablet, Rfl: 2     Objective     History of Present Illness     Review of Systems   Constitutional: Negative.    HENT: Negative.    Respiratory: Negative.    Cardiovascular: Negative.    Gastrointestinal: Negative.    Genitourinary: Negative.    Musculoskeletal: Negative.    Skin: Negative.    Neurological: Negative.    Hematological: Negative.    Psychiatric/Behavioral: Negative.        Physical Exam  Vitals signs (136/80) and nursing note reviewed.   Constitutional:       General: He is not in acute distress.     Appearance: Normal appearance. He is obese. He is not ill-appearing.   HENT:      Head: Normocephalic and atraumatic.   Eyes:      General: No scleral icterus.     Conjunctiva/sclera: Conjunctivae normal.   Neck:      Musculoskeletal: Normal range of motion and neck supple.   Cardiovascular:      Rate and Rhythm: Normal rate and regular rhythm.      Heart sounds: Normal heart sounds.   Pulmonary:      Effort: Pulmonary effort is normal. No respiratory distress.      Breath sounds: Normal breath sounds. No wheezing or rales.   Musculoskeletal: Normal range of motion.   Skin:     General: Skin is warm and dry.   Neurological:      General: No focal deficit present.      Mental Status: He is alert and oriented to person, place, and time.   Psychiatric:         Mood and Affect: Mood normal.         Behavior: Behavior normal.          ASSESSMENT CMP is completely normal.  Sugar was 93.  Hemoglobin A1c is stable at 6.0.  Lipid panel has total cholesterol 152, HDL 49, LDL 78  #1-hypertension, controlled  #2-hyperlipidemia, reasonable control on medication  #3-hyperglycemia with normal sugar and stable acceptable hemoglobin A1c  #4-GERD, stable on medications  #5-obesity with no significant weight loss     Problems Addressed this Visit        Cardiac and Vasculature    Hyperlipidemia    Relevant Medications    atorvastatin (LIPITOR) 20 MG tablet    Hypertension    Relevant Medications    lisinopril (PRINIVIL,ZESTRIL) 5 MG tablet    Coronary artery disease - Primary       Endocrine and Metabolic    Adiposity    Hyperglycemia       Gastrointestinal Abdominal     GERD (gastroesophageal reflux disease)      Diagnoses       Codes Comments    Coronary artery disease involving native heart without angina pectoris, unspecified vessel or lesion type    -  Primary ICD-10-CM: I25.10  ICD-9-CM: 414.01     Essential hypertension     ICD-10-CM: I10  ICD-9-CM: 401.9     Hyperlipidemia, unspecified hyperlipidemia type     ICD-10-CM: E78.5  ICD-9-CM: 272.4     Hyperglycemia     ICD-10-CM: R73.9  ICD-9-CM: 790.29     Class 3 severe obesity due to excess calories without serious comorbidity with body mass index (BMI) of 40.0 to 44.9 in adult (CMS/[facility])     ICD-10-CM: E66.01, Z68.41  ICD-9-CM: 278.01, V85.41     Gastroesophageal reflux disease, unspecified whether esophagitis present     ICD-10-CM: K21.9  ICD-9-CM: 530.81           PLAN the patient will continue current medicines as now and I will plan on rechecking him in 6 months with a CBC, CMP, lipid panel, PSA, TSH and hemoglobin A1c    There are no Patient Instructions on file for this visit.  Return in about 6 months (around 7/27/2021) for with labs.

## 2021-02-03 ENCOUNTER — TELEPHONE (OUTPATIENT)
Dept: GASTROENTEROLOGY | Facility: CLINIC | Age: 66
End: 2021-02-03

## 2021-02-03 DIAGNOSIS — K21.9 GASTROESOPHAGEAL REFLUX DISEASE: ICD-10-CM

## 2021-02-03 RX ORDER — PANTOPRAZOLE SODIUM 40 MG/1
40 TABLET, DELAYED RELEASE ORAL 2 TIMES DAILY
Qty: 180 TABLET | Refills: 3 | Status: SHIPPED | OUTPATIENT
Start: 2021-02-03 | End: 2021-03-26 | Stop reason: SDUPTHER

## 2021-02-03 NOTE — TELEPHONE ENCOUNTER
----- Message from Donovan Serrano sent at 2/3/2021  8:36 AM EST -----  Regarding: med  Contact: [phone]  Pt is needing refill, has appt 2/9 . He also has a new pharmacy listed below.        pantoprazole (PROTONIX) 40 MG EC tablet           [facility]  [address] [facility]  [phone]

## 2021-03-22 ENCOUNTER — BULK ORDERING (OUTPATIENT)
Dept: CASE MANAGEMENT | Facility: OTHER | Age: 66
End: 2021-03-22

## 2021-03-22 DIAGNOSIS — Z23 IMMUNIZATION DUE: ICD-10-CM

## 2021-03-26 ENCOUNTER — OFFICE VISIT (OUTPATIENT)
Dept: GASTROENTEROLOGY | Facility: CLINIC | Age: 66
End: 2021-03-26

## 2021-03-26 VITALS — WEIGHT: 265 LBS | BODY MASS INDEX: 40.16 KG/M2 | HEIGHT: 68 IN | TEMPERATURE: 97.7 F

## 2021-03-26 DIAGNOSIS — K21.9 GASTROESOPHAGEAL REFLUX DISEASE WITHOUT ESOPHAGITIS: ICD-10-CM

## 2021-03-26 DIAGNOSIS — Z87.19 HISTORY OF ACUTE GASTRITIS: ICD-10-CM

## 2021-03-26 DIAGNOSIS — Z79.1 NSAID LONG-TERM USE: ICD-10-CM

## 2021-03-26 DIAGNOSIS — R10.13 EPIGASTRIC PAIN: Primary | ICD-10-CM

## 2021-03-26 DIAGNOSIS — K21.9 GASTROESOPHAGEAL REFLUX DISEASE: ICD-10-CM

## 2021-03-26 PROCEDURE — 99214 OFFICE O/P EST MOD 30 MIN: CPT | Performed by: NURSE PRACTITIONER

## 2021-03-26 RX ORDER — PANTOPRAZOLE SODIUM 40 MG/1
40 TABLET, DELAYED RELEASE ORAL 2 TIMES DAILY
Qty: 180 TABLET | Refills: 3 | Status: SHIPPED | OUTPATIENT
Start: 2021-03-26 | End: 2021-11-18

## 2021-03-26 NOTE — PROGRESS NOTES
Chief Complaint   Patient presents with   • Heartburn   • Nausea       Antony Tatum is a  65 y.o. male here for a follow up visit for GERD.    HPI  65-year-old male presents today for follow-up visit for upper abdominal pain and GERD.  He is a patient of Dr. Nolen.  He was last seen in the office by me on 4/2019.  He has a history of GERD/gastritis and admits last several weeks his reflux has been worse.  It is even been keeping him up at night.  He tells me he is having upper abdominal pain worse after he eats.  He does take 3 ibuprofen a day for his chronic knee pain.  He is getting ready to get a knee replacement in a couple of weeks.  He does take Protonix 40 mg every morning but even though it was prescribed last visit for him to take twice a day for some reason he is only doing it once a day.  He is taking the Carafate 3-4 times a day but he tells me he is just swallowing the pills whole and not dissolving them.  He denies any dysphagia, nausea and vomiting, diarrhea, constipation, rectal bleeding or melena.  He does admit his stool is black but he is also taking Pepto-Bismol every day.  His last EGD was done in 2016.  At that time he did have gastritis.  Last colonoscopy was in 2016 as well.  It was normal per patient report.  Past Medical History:   Diagnosis Date   • Anxiety    • Coronary artery disease    • Depression    • Diverticulosis    • Erosive gastritis    • Family hx of colon cancer    • H/O CT scan of abdomen 01/29/2014    ddd, tics, tortuosity w/aortoiliac atherosclerosis, HH, cardiac enlargement   • Heart attack (CMS/HCC)    • Hepatitis     HEP C   • Hiatal hernia    • History of CT scan of abdomen    • Hyperlipidemia    • Hypertension    • Obese    • Thoracic aortic aneurysm without rupture (CMS/HCC)        Past Surgical History:   Procedure Laterality Date   • CERVICAL DISCECTOMY ANTERIOR      anterior spinal, osteophytectomy cerv interspace   • CERVICAL DISCECTOMY ANTERIOR  12/07/2015     spinal; C5-C6 and C6-C7 anterior cervical discectomy and arthrodesis; Jon Lua   • CHOLECYSTECTOMY     • COLONOSCOPY  06/10/2016    himanshu Sterling M.D.   • CORONARY ANGIOPLASTY WITH STENT PLACEMENT     • CORONARY ANGIOPLASTY WITH STENT PLACEMENT N/A     10yrs ago with Dr Ruff at [facility]; debatable at whether he had an MI that admission   • ENDOSCOPY N/A 12/28/2016    Medium sized HH, gastritis.     • GALLBLADDER SURGERY     • UPPER GASTROINTESTINAL ENDOSCOPY  02/12/2014    LA Grade A reflux esophagitis, HH, erosive gtastritis, bx       Scheduled Meds:    Continuous Infusions:No current facility-administered medications for this visit.      PRN Meds:.    No Known Allergies    Social History     Socioeconomic History   • Marital status:      Spouse name: Not on file   • Number of children: Not on file   • Years of education: Not on file   • Highest education level: Not on file   Tobacco Use   • Smoking status: Never Smoker   • Smokeless tobacco: Never Used   Substance and Sexual Activity   • Alcohol use: Yes     Alcohol/week: 2.0 standard drinks     Types: 2 Shots of liquor per week     Comment: weekly   • Drug use: No   • Sexual activity: Defer       Family History   Problem Relation Age of Onset   • Colon cancer Mother    • Diabetes Mother    • Hypertension Mother    • Breast cancer Sister        Review of Systems   Constitutional: Positive for appetite change and fatigue. Negative for chills, diaphoresis, fever and unexpected weight change.   HENT: Negative for nosebleeds, postnasal drip, sore throat, trouble swallowing and voice change.    Respiratory: Negative for cough, choking, chest tightness, shortness of breath and wheezing.    Cardiovascular: Negative for chest pain, palpitations and leg swelling.   Gastrointestinal: Positive for abdominal distention and abdominal pain. Negative for anal bleeding, blood in stool, constipation, diarrhea, nausea, rectal pain and vomiting.   Endocrine:  Negative for polydipsia, polyphagia and polyuria.   Musculoskeletal: Negative for gait problem.   Skin: Negative for rash and wound.   Allergic/Immunologic: Negative for food allergies.   Neurological: Negative for dizziness, speech difficulty and light-headedness.   Psychiatric/Behavioral: Negative for confusion, self-injury, sleep disturbance and suicidal ideas.       Vitals:    03/26/21 1028   Temp: 97.7 °F (36.5 °C)       Physical Exam  Constitutional:       General: He is not in acute distress.     Appearance: He is well-developed. He is not ill-appearing.   HENT:      Head: Normocephalic.   Eyes:      Pupils: Pupils are equal, round, and reactive to light.   Cardiovascular:      Rate and Rhythm: Normal rate and regular rhythm.      Heart sounds: Normal heart sounds.   Pulmonary:      Effort: Pulmonary effort is normal.      Breath sounds: Normal breath sounds.   Abdominal:      General: Bowel sounds are normal. There is distension.      Palpations: Abdomen is soft. There is no mass.      Tenderness: There is abdominal tenderness. There is no guarding or rebound.      Hernia: No hernia is present.       Musculoskeletal:         General: Normal range of motion.   Skin:     General: Skin is warm and dry.   Neurological:      Mental Status: He is alert and oriented to person, place, and time.   Psychiatric:         Speech: Speech normal.         Behavior: Behavior normal.         Judgment: Judgment normal.         No radiology results for the last 7 days     Assessment and plan     1. Epigastric pain  - Case Request; Standing  - Case Request    2. Gastroesophageal reflux disease without esophagitis  - Case Request; Standing  - Case Request    3. History of acute gastritis  - Case Request; Standing  - Case Request    4. NSAID long-term use  - Case Request; Standing  - Case Request    Given his history and current symptoms recommend EGD with Dr. Nolen for further evaluation.  Patient is agreeable to the scope.  For  now I want him to take the Protonix 40 mg twice a day before meals.  Continue the Carafate 3-4 times a day but he needs to dissolve it in water.  I think it will work better for him.  He really needs to cut out the NSAIDs right now.  I do worry that he has given his cell for potential ulcer.  Patient to switch to Tylenol arthritis or talk to his orthopedic about an alternative.  Patient to call the office next week with an update.  Patient to follow-up with me after his scope.  Patient wanted to make sure it was not his heart so he will be following up with his heart doctor next week about the same symptoms as well.  Patient is agreeable to the plan.

## 2021-03-29 ENCOUNTER — TELEPHONE (OUTPATIENT)
Dept: GASTROENTEROLOGY | Facility: CLINIC | Age: 66
End: 2021-03-29

## 2021-04-01 ENCOUNTER — TELEPHONE (OUTPATIENT)
Dept: GASTROENTEROLOGY | Facility: CLINIC | Age: 66
End: 2021-04-01

## 2021-04-01 NOTE — TELEPHONE ENCOUNTER
----- Message from Donovan Chacon sent at 4/1/2021 12:23 PM EDT -----  Regarding: Scope  Contact: [phone]  PT states Having scope in may will delay his knee surgery and he needs that done immediately, please call PT to further discuss

## 2021-04-01 NOTE — TELEPHONE ENCOUNTER
You would have to send a message to the provider that we will be doing the scope to see if they can move him up on their EGD scheduled.  He should be okay to do his knee surgery as long as he feels stable.  Thanks

## 2021-04-01 NOTE — TELEPHONE ENCOUNTER
Called pt and pt states that his egd is scheduled for 05/26.  Pt is scheduled for a partial knee surgery 04/13.  He is asking if he can get the egd moved up.  Also pt is asking if it is safe for him to have surgery on the 13th. Pt denies seeing any blood in his stool and reports that pantoprazole has helped .  Advised will send message to Tanvi BAUTISTA.

## 2021-04-02 ENCOUNTER — TELEPHONE (OUTPATIENT)
Dept: FAMILY MEDICINE CLINIC | Facility: CLINIC | Age: 66
End: 2021-04-02

## 2021-04-02 NOTE — TELEPHONE ENCOUNTER
Caller: Antony Tatum    Relationship: Self    Best call back number: [phone]    What medication are you requesting: UNKNOWN    What are your current symptoms: JOCK ITCH RASH RUNNY     How long have you been experiencing symptoms:  4-5 DAYS    Have you had these symptoms before:    [x] Yes  [] No    Have you been treated for these symptoms before:   [x] Yes  [] No    If a prescription is needed, what is your preferred pharmacy and phone number: [facility]/PHARMACY #[id number] [place], [place] - [address] AT [facility] [phone] [facility] [phone] FX     Additional notes: THE PATIENT STATES THAT HE HAS A RED BURNING RUNNY RASH IN HIS GROIN AREA. THE PATIENT STATES THAT HE HAS EXPERIENCED THIS WHEN HE WAS YOUNGER AND PLAYED BASEBALL. THE PATIENT STATES THAT THIS HAS BEEN GOING ON FOR 4-5 DAYS AND HAS TRIED TO SELF TREAT. PLEASE ADVISE.

## 2021-04-05 NOTE — TELEPHONE ENCOUNTER
There is an opening on April 19 at [facility], [place], operated by [facility]-please schedule him on this day if possible-let me know if there is an issue

## 2021-04-30 ENCOUNTER — TELEPHONE (OUTPATIENT)
Dept: FAMILY MEDICINE CLINIC | Facility: CLINIC | Age: 66
End: 2021-04-30

## 2021-04-30 NOTE — TELEPHONE ENCOUNTER
PATIENT STATES:THAT HE WOULD LIKE TO HAVE A referral TO A gastroenterology PLEASE ADVISE     PATIENT CAN BE REACHED ON: [phone]

## 2021-05-04 RX ORDER — SUCRALFATE 1 G/1
1 TABLET ORAL
Qty: 120 TABLET | Refills: 3 | Status: SHIPPED | OUTPATIENT
Start: 2021-05-04 | End: 2022-01-06

## 2021-05-05 ENCOUNTER — TELEPHONE (OUTPATIENT)
Dept: GASTROENTEROLOGY | Facility: CLINIC | Age: 66
End: 2021-05-05

## 2021-05-05 NOTE — TELEPHONE ENCOUNTER
----- Message from Donovan Chacon sent at 5/5/2021  8:28 AM EDT -----  Regarding: Cancel Scope  Contact: [phone]  PT would like to cancel Scope

## 2021-05-05 NOTE — TELEPHONE ENCOUNTER
returned pt call to cancel/reschedule no answer lm on  pt to call back to reschedule put in depot with elizabeth

## 2021-05-18 ENCOUNTER — OFFICE VISIT (OUTPATIENT)
Dept: FAMILY MEDICINE CLINIC | Facility: CLINIC | Age: 66
End: 2021-05-18

## 2021-05-18 VITALS
HEIGHT: 68 IN | DIASTOLIC BLOOD PRESSURE: 72 MMHG | TEMPERATURE: 98 F | SYSTOLIC BLOOD PRESSURE: 110 MMHG | WEIGHT: 242.2 LBS | HEART RATE: 82 BPM | OXYGEN SATURATION: 100 % | BODY MASS INDEX: 36.71 KG/M2 | RESPIRATION RATE: 18 BRPM

## 2021-05-18 DIAGNOSIS — K21.9 GASTROESOPHAGEAL REFLUX DISEASE WITHOUT ESOPHAGITIS: Primary | ICD-10-CM

## 2021-05-18 DIAGNOSIS — R10.13 EPIGASTRIC PAIN: ICD-10-CM

## 2021-05-18 LAB
ALBUMIN SERPL-MCNC: 4.7 G/DL (ref 3.5–5.2)
ALBUMIN/GLOB SERPL: 2.5 G/DL
ALP SERPL-CCNC: 154 U/L (ref 39–117)
ALT SERPL-CCNC: 15 U/L (ref 1–41)
AMYLASE SERPL-CCNC: 45 U/L (ref 28–100)
AST SERPL-CCNC: 15 U/L (ref 1–40)
BASOPHILS # BLD AUTO: 0.08 10*3/MM3 (ref 0–0.2)
BASOPHILS NFR BLD AUTO: 1 % (ref 0–1.5)
BILIRUB SERPL-MCNC: 0.6 MG/DL (ref 0–1.2)
BUN SERPL-MCNC: 12 MG/DL (ref 8–23)
BUN/CREAT SERPL: 16 (ref 7–25)
CALCIUM SERPL-MCNC: 10.1 MG/DL (ref 8.6–10.5)
CHLORIDE SERPL-SCNC: 101 MMOL/L (ref 98–107)
CO2 SERPL-SCNC: 28.4 MMOL/L (ref 22–29)
CREAT SERPL-MCNC: 0.75 MG/DL (ref 0.76–1.27)
EOSINOPHIL # BLD AUTO: 0.34 10*3/MM3 (ref 0–0.4)
EOSINOPHIL NFR BLD AUTO: 4.2 % (ref 0.3–6.2)
ERYTHROCYTE [DISTWIDTH] IN BLOOD BY AUTOMATED COUNT: 12.8 % (ref 12.3–15.4)
GLOBULIN SER CALC-MCNC: 1.9 GM/DL
GLUCOSE SERPL-MCNC: 95 MG/DL (ref 65–99)
HCT VFR BLD AUTO: 40.1 % (ref 37.5–51)
HGB BLD-MCNC: 13 G/DL (ref 13–17.7)
IMM GRANULOCYTES # BLD AUTO: 0.03 10*3/MM3 (ref 0–0.05)
IMM GRANULOCYTES NFR BLD AUTO: 0.4 % (ref 0–0.5)
LIPASE SERPL-CCNC: 20 U/L (ref 13–60)
LYMPHOCYTES # BLD AUTO: 1.59 10*3/MM3 (ref 0.7–3.1)
LYMPHOCYTES NFR BLD AUTO: 19.6 % (ref 19.6–45.3)
MCH RBC QN AUTO: 29.4 PG (ref 26.6–33)
MCHC RBC AUTO-ENTMCNC: 32.4 G/DL (ref 31.5–35.7)
MCV RBC AUTO: 90.7 FL (ref 79–97)
MONOCYTES # BLD AUTO: 0.73 10*3/MM3 (ref 0.1–0.9)
MONOCYTES NFR BLD AUTO: 9 % (ref 5–12)
NEUTROPHILS # BLD AUTO: 5.33 10*3/MM3 (ref 1.7–7)
NEUTROPHILS NFR BLD AUTO: 65.8 % (ref 42.7–76)
NRBC BLD AUTO-RTO: 0 /100 WBC (ref 0–0.2)
PLATELET # BLD AUTO: 370 10*3/MM3 (ref 140–450)
POTASSIUM SERPL-SCNC: 4.4 MMOL/L (ref 3.5–5.2)
PROT SERPL-MCNC: 6.6 G/DL (ref 6–8.5)
RBC # BLD AUTO: 4.42 10*6/MM3 (ref 4.14–5.8)
SODIUM SERPL-SCNC: 141 MMOL/L (ref 136–145)
WBC # BLD AUTO: 8.1 10*3/MM3 (ref 3.4–10.8)

## 2021-05-18 PROCEDURE — 99214 OFFICE O/P EST MOD 30 MIN: CPT | Performed by: INTERNAL MEDICINE

## 2021-05-18 RX ORDER — HYDROCODONE BITARTRATE AND ACETAMINOPHEN 7.5; 325 MG/1; MG/1
TABLET ORAL
COMMUNITY
Start: 2021-04-28 | End: 2022-03-17

## 2021-05-18 RX ORDER — ZOLPIDEM TARTRATE 5 MG/1
1 TABLET ORAL
COMMUNITY
End: 2021-08-25

## 2021-05-18 NOTE — PROGRESS NOTES
Subjective   Antony Tatum is a 65 y.o. male. Patient is here today for hospital follow-up from admission at [facility] for abdominal pain.  Patient had left total knee replacement in April.  Then he developed abdominal pains in the epigastric and generalized abdominal area.  He does have a history of pancreatitis and has had a cholecystectomy.  He was found to have blood in the stool and had EGD and colonoscopy by Dr. Watkins with essentially diverticulosis of the colon as the only finding.  He was discharged on Carafate and pantoprazole but has not been taking those at full dose.  He returns today with continuing epigastric and abdominal complaints.  He has some nausea.  Not really vomiting has not had any significant bowel changes.  His appetite's been poor and he has lost about 20 pounds according to the patient.  Chief Complaint   Patient presents with   • Abdominal Pain     PT HERE FOR HOSPITAL FOLLOW UP FOR ABD PAIN AND KNEE REPLACEMENT       (Not on file)-  Risk for Readmission (LACE) No data recorded         Vitals:    05/18/21 1102   BP: 110/72   Pulse: 82   Resp: 18   Temp: 98 °F (36.7 °C)   SpO2: 100%     The following portions of the patient's history were reviewed and updated as appropriate: allergies, current medications, past family history, past medical history, past social history, past surgical history and problem list.    Past Medical History:   Diagnosis Date   • Anxiety    • Coronary artery disease    • Depression    • Diverticulosis    • Erosive gastritis    • Family hx of colon cancer    • H/O CT scan of abdomen 01/29/2014    ddd, tics, tortuosity w/aortoiliac atherosclerosis, HH, cardiac enlargement   • Heart attack (CMS/HCC)    • Hepatitis     HEP C   • Hiatal hernia    • History of CT scan of abdomen    • Hyperlipidemia    • Hypertension    • Obese    • Thoracic aortic aneurysm without rupture (CMS/HCC)       No Known Allergies   Social History     Socioeconomic History   • Marital  status:      Spouse name: Not on file   • Number of children: Not on file   • Years of education: Not on file   • Highest education level: Not on file   Tobacco Use   • Smoking status: Never Smoker   • Smokeless tobacco: Never Used   Substance and Sexual Activity   • Alcohol use: Yes     Alcohol/week: 2.0 standard drinks     Types: 2 Shots of liquor per week     Comment: weekly   • Drug use: No   • Sexual activity: Defer        Current Outpatient Medications:   •  aspirin 81 MG tablet, Take by mouth., Disp: , Rfl:   •  atorvastatin (LIPITOR) 20 MG tablet, Take 1 tablet by mouth Daily., Disp: 90 tablet, Rfl: 3  •  HYDROcodone-acetaminophen (NORCO) 7.5-325 MG per tablet, , Disp: , Rfl:   •  lisinopril (PRINIVIL,ZESTRIL) 5 MG tablet, Take 1 tablet by mouth Daily., Disp: 90 tablet, Rfl: 3  •  pantoprazole (PROTONIX) 40 MG EC tablet, Take 1 tablet by mouth 2 (Two) Times a Day., Disp: 180 tablet, Rfl: 3  •  sucralfate (CARAFATE) 1 g tablet, TAKE 1 TABLET BY MOUTH 4 (FOUR) TIMES A DAY BEFORE MEALS & AT BEDTIME AS NEEDED (ABDOMINAL PAIN)., Disp: 120 tablet, Rfl: 3  •  zolpidem (AMBIEN) 5 MG tablet, Take 1 tablet by mouth., Disp: , Rfl:      Objective     History of Present Illness     Review of Systems   Constitutional: Positive for unexpected weight change.        20 pound light weight loss according to the patient because of abdominal discomfort   HENT: Negative.    Respiratory: Negative.    Cardiovascular: Negative.    Gastrointestinal: Positive for abdominal pain.   Genitourinary: Negative.    Musculoskeletal:        Left knee pain from recent total knee replacement   Skin: Negative.    Neurological: Negative.    Hematological: Negative.    Psychiatric/Behavioral: Negative.        Physical Exam  Vitals and nursing note reviewed.   Constitutional:       General: He is not in acute distress.     Appearance: Normal appearance. He is not ill-appearing.   HENT:      Head: Normocephalic and atraumatic.   Eyes:       General: No scleral icterus.     Conjunctiva/sclera: Conjunctivae normal.   Cardiovascular:      Rate and Rhythm: Normal rate and regular rhythm.      Heart sounds: Normal heart sounds.   Pulmonary:      Effort: Pulmonary effort is normal. No respiratory distress.      Breath sounds: Normal breath sounds. No wheezing or rales.   Abdominal:      General: Abdomen is flat. There is no distension.      Palpations: Abdomen is soft. There is no mass.      Tenderness: There is no abdominal tenderness. There is no right CVA tenderness, left CVA tenderness, guarding or rebound.   Musculoskeletal:      Cervical back: Normal range of motion and neck supple.      Comments: Healing left knee incision from total knee replacement without signs of infection   Skin:     General: Skin is warm and dry.      Findings: No erythema.   Neurological:      General: No focal deficit present.      Mental Status: He is alert and oriented to person, place, and time.   Psychiatric:         Mood and Affect: Mood normal.         Behavior: Behavior normal.         ASSESSMENT I reviewed the hospital records including his laboratory studies and CT scan report  #1-continuing abdominal pain with anorexia and weight loss  #2-hypertension controlled  #3-GERD  #4-recent left total knee replacement       Problems Addressed this Visit        Gastrointestinal Abdominal     Epigastric pain    Relevant Orders    CBC & Differential    Comprehensive Metabolic Panel    Amylase    Lipase    Ambulatory Referral to Gastroenterology    Gastroesophageal reflux disease without esophagitis - Primary    Relevant Orders    CBC & Differential    Comprehensive Metabolic Panel    Amylase    Lipase    Ambulatory Referral to Gastroenterology      Diagnoses       Codes Comments    Gastroesophageal reflux disease without esophagitis    -  Primary ICD-10-CM: K21.9  ICD-9-CM: 530.81     Epigastric pain     ICD-10-CM: R10.13  ICD-9-CM: 789.06           Current outpatient and  discharge medications have been reconciled for the patient.  Reviewed by: Som Justice MD      PLAN the patient prefers Dr. Hair if she can see him but may need to get back with Dr. Watkins since he is not improving.  I advised the patient to take the pantoprazole and Carafate as directed regularly for the next 2 weeks.  I have ordered a CBC, CMP, amylase and lipase to be drawn and would like to recheck him next week    There are no Patient Instructions on file for this visit.  Return in about 1 week (around 5/25/2021).

## 2021-05-21 ENCOUNTER — TELEPHONE (OUTPATIENT)
Dept: FAMILY MEDICINE CLINIC | Facility: CLINIC | Age: 66
End: 2021-05-21

## 2021-05-21 NOTE — TELEPHONE ENCOUNTER
Caller: Sindy Tatum    Relationship: Emergency Contact    Best call back number: [phone]    Caller requesting test results: YES    What test was performed: BLOODWORK    When was the test performed: 5/18/21    Where was the test performed:OFFICE    Additional notes:

## 2021-05-27 ENCOUNTER — OFFICE VISIT (OUTPATIENT)
Dept: FAMILY MEDICINE CLINIC | Facility: CLINIC | Age: 66
End: 2021-05-27

## 2021-05-27 VITALS
WEIGHT: 238 LBS | TEMPERATURE: 97.3 F | DIASTOLIC BLOOD PRESSURE: 74 MMHG | HEIGHT: 68 IN | BODY MASS INDEX: 36.07 KG/M2 | RESPIRATION RATE: 18 BRPM | HEART RATE: 65 BPM | OXYGEN SATURATION: 97 % | SYSTOLIC BLOOD PRESSURE: 120 MMHG

## 2021-05-27 DIAGNOSIS — R10.13 EPIGASTRIC PAIN: Primary | ICD-10-CM

## 2021-05-27 DIAGNOSIS — K21.9 GASTROESOPHAGEAL REFLUX DISEASE WITHOUT ESOPHAGITIS: ICD-10-CM

## 2021-05-27 PROCEDURE — 99213 OFFICE O/P EST LOW 20 MIN: CPT | Performed by: INTERNAL MEDICINE

## 2021-05-27 NOTE — PROGRESS NOTES
Subjective   Antony Tatum is a 65 y.o. male. Patient is here today for follow-up on laboratory studies.  He continues with abdominal discomfort and early satiety.  He has had no marked improvement with Carafate and pantoprazole.  He has not worked out and is continued with Dr. Watkins and saw him a few days ago.  He has been scheduled for an MRI of the abdomen on June 12.  He gets some abdominal pain after eating but has had no nausea vomiting and bowels have been moving reasonably well.  Chief Complaint   Patient presents with   • Hypertension     FOLLW UP, 1 WEEK          Vitals:    05/27/21 1303   BP: 120/74   Pulse: 65   Resp: 18   Temp: 97.3 °F (36.3 °C)   SpO2: 97%     Body mass index is 36.2 kg/m².  The following portions of the patient's history were reviewed and updated as appropriate: allergies, current medications, past family history, past medical history, past social history, past surgical history and problem list.    Past Medical History:   Diagnosis Date   • Anxiety    • Coronary artery disease    • Depression    • Diverticulosis    • Erosive gastritis    • Family hx of colon cancer    • H/O CT scan of abdomen 01/29/2014    ddd, tics, tortuosity w/aortoiliac atherosclerosis, HH, cardiac enlargement   • Heart attack (CMS/HCC)    • Hepatitis     HEP C   • Hiatal hernia    • History of CT scan of abdomen    • Hyperlipidemia    • Hypertension    • Obese    • Thoracic aortic aneurysm without rupture (CMS/HCC)       No Known Allergies   Social History     Socioeconomic History   • Marital status:      Spouse name: Not on file   • Number of children: Not on file   • Years of education: Not on file   • Highest education level: Not on file   Tobacco Use   • Smoking status: Never Smoker   • Smokeless tobacco: Never Used   Substance and Sexual Activity   • Alcohol use: Yes     Alcohol/week: 2.0 standard drinks     Types: 2 Shots of liquor per week     Comment: weekly   • Drug use: No   • Sexual  activity: Defer        Current Outpatient Medications:   •  aspirin 81 MG tablet, Take by mouth., Disp: , Rfl:   •  atorvastatin (LIPITOR) 20 MG tablet, Take 1 tablet by mouth Daily., Disp: 90 tablet, Rfl: 3  •  HYDROcodone-acetaminophen (NORCO) 7.5-325 MG per tablet, , Disp: , Rfl:   •  lisinopril (PRINIVIL,ZESTRIL) 5 MG tablet, Take 1 tablet by mouth Daily., Disp: 90 tablet, Rfl: 3  •  pantoprazole (PROTONIX) 40 MG EC tablet, Take 1 tablet by mouth 2 (Two) Times a Day., Disp: 180 tablet, Rfl: 3  •  sucralfate (CARAFATE) 1 g tablet, TAKE 1 TABLET BY MOUTH 4 (FOUR) TIMES A DAY BEFORE MEALS & AT BEDTIME AS NEEDED (ABDOMINAL PAIN)., Disp: 120 tablet, Rfl: 3  •  zolpidem (AMBIEN) 5 MG tablet, Take 1 tablet by mouth., Disp: , Rfl:      Objective     History of Present Illness     Review of Systems   Constitutional: Negative.    HENT: Negative.    Respiratory: Negative.    Cardiovascular: Negative.    Gastrointestinal: Positive for abdominal distention and abdominal pain.   Genitourinary: Negative.    Musculoskeletal: Negative.    Skin: Negative.    Neurological: Negative.    Hematological: Negative.    Psychiatric/Behavioral: Negative.        Physical Exam  Vitals and nursing note reviewed.   Constitutional:       General: He is not in acute distress.     Appearance: Normal appearance. He is not ill-appearing.   Eyes:      General: No scleral icterus.     Conjunctiva/sclera: Conjunctivae normal.   Cardiovascular:      Rate and Rhythm: Normal rate and regular rhythm.      Heart sounds: Normal heart sounds.   Pulmonary:      Effort: Pulmonary effort is normal. No respiratory distress.      Breath sounds: Normal breath sounds. No wheezing or rales.   Musculoskeletal:         General: Tenderness present. Normal range of motion.      Cervical back: Normal range of motion and neck supple.      Comments: Healed left knee incision from recent surgery   Skin:     General: Skin is warm and dry.   Neurological:      General: No  focal deficit present.      Mental Status: He is alert and oriented to person, place, and time.   Psychiatric:         Mood and Affect: Mood normal.         Behavior: Behavior normal.         ASSESSMENT CBC had a normal white cell count of 8100, normal RBC, hemoglobin and hematocrit which have improved and are fine.  CMP was completely normal aside from alkaline phosphatase is 154, most probably related to his recent knee surgery.  Amylase was quite normal at 45 and lipase is also quite normal at 20.  #1-continuing complaints of early satiety and upper abdominal pain, uncertain etiology, but no help with Carafate and pantoprazole     Problems Addressed this Visit        Gastrointestinal Abdominal     Epigastric pain - Primary    Gastroesophageal reflux disease without esophagitis      Diagnoses       Codes Comments    Epigastric pain    -  Primary ICD-10-CM: R10.13  ICD-9-CM: 789.06     Gastroesophageal reflux disease without esophagitis     ICD-10-CM: K21.9  ICD-9-CM: 530.81           PLAN the patient has an upcoming MRI scan of the abdomen through Dr. Watkins.  He is already scheduled for follow-up with me in July with laboratory studies and will keep that appointment.    There are no Patient Instructions on file for this visit.  No follow-ups on file.

## 2021-06-07 ENCOUNTER — TELEPHONE (OUTPATIENT)
Dept: FAMILY MEDICINE CLINIC | Facility: CLINIC | Age: 66
End: 2021-06-07

## 2021-06-07 NOTE — TELEPHONE ENCOUNTER
PATIENT CALLED IN REQUESTING THE RESULTS FROM THE MRI HE HAD DONE.    BEST CALL BACK # [phone]

## 2021-06-08 NOTE — TELEPHONE ENCOUNTER
I DO NOT SEE A RECENT MRI OR IMAGING REPORT IN PATIENTS CHART. I CALLED AND LEFT HIM A VOICEMAIL ASKING FOR A RETURN CALL.

## 2021-06-09 NOTE — TELEPHONE ENCOUNTER
HUB TO READ:     CALLED AND LEFT MESSAGE FOR PT ADVISING THAT PER DR. PANIAGUA, MRI SHOWS HERNIA WHICH PATIENT IS ALREADY HAVING ADDRESSED, AND OTHERWISE DIDN'T SHOW ANYTHING THAT WOULD INDICATE WHERE THE PAIN IS COMING FROM. I ASKED PATIENT TO CALL BACK IF HE HAD ANY QUESTIONS.

## 2021-06-14 ENCOUNTER — TELEPHONE (OUTPATIENT)
Dept: FAMILY MEDICINE CLINIC | Facility: CLINIC | Age: 66
End: 2021-06-14

## 2021-06-14 NOTE — TELEPHONE ENCOUNTER
PT CALLED AND WOULD NOT EXPLAIN WHAT WAS GOING ON. WOULD LIKE A CALL FROM THE MA OR .    PT# [phone]

## 2021-06-16 ENCOUNTER — TELEPHONE (OUTPATIENT)
Dept: GASTROENTEROLOGY | Facility: CLINIC | Age: 66
End: 2021-06-16

## 2021-06-16 NOTE — TELEPHONE ENCOUNTER
Spoke with pt he stated that he needs an office visit with dr whitman only pt refused to see APRN scheduled on nov 18 at 1015 am put pt on wait list/ returned aisha's call with Dr Robertson she is aware

## 2021-06-16 NOTE — TELEPHONE ENCOUNTER
I CALLED AND S/W PT AND ADVISED WHAT DR. PANIAGUA SAID. PT SAID HE HAD AN APPT WITH A SPECIALIST TODAY AND SHOWED IT TO THEM AND THEY GAVE HIM THE NAME OF A DERMATOLOGIST HE IS GOING TO CALL.

## 2021-07-01 DIAGNOSIS — G47.09 OTHER INSOMNIA: ICD-10-CM

## 2021-07-06 NOTE — TELEPHONE ENCOUNTER
PATIENT IS CALLING TO CHECK ON STATUS OF MEDICATION. PHARMACY STATED THIS NEEDS A PRIOR AUTHORIZATION FROM DR. PANIAGUA.

## 2021-07-08 RX ORDER — ZOLPIDEM TARTRATE 10 MG/1
10 TABLET ORAL NIGHTLY PRN
Qty: 30 TABLET | Refills: 1 | Status: SHIPPED | OUTPATIENT
Start: 2021-07-08 | End: 2021-09-24

## 2021-07-16 DIAGNOSIS — E78.5 HYPERLIPIDEMIA, UNSPECIFIED HYPERLIPIDEMIA TYPE: ICD-10-CM

## 2021-07-16 DIAGNOSIS — R73.9 HYPERGLYCEMIA: ICD-10-CM

## 2021-07-16 DIAGNOSIS — Z12.5 SCREENING PSA (PROSTATE SPECIFIC ANTIGEN): ICD-10-CM

## 2021-08-25 ENCOUNTER — OFFICE VISIT (OUTPATIENT)
Dept: FAMILY MEDICINE CLINIC | Facility: CLINIC | Age: 66
End: 2021-08-25

## 2021-08-25 VITALS
RESPIRATION RATE: 18 BRPM | SYSTOLIC BLOOD PRESSURE: 124 MMHG | HEIGHT: 68 IN | DIASTOLIC BLOOD PRESSURE: 72 MMHG | BODY MASS INDEX: 35.01 KG/M2 | WEIGHT: 231 LBS | HEART RATE: 65 BPM | OXYGEN SATURATION: 99 % | TEMPERATURE: 97.5 F

## 2021-08-25 DIAGNOSIS — I10 ESSENTIAL HYPERTENSION: ICD-10-CM

## 2021-08-25 DIAGNOSIS — R10.13 EPIGASTRIC PAIN: ICD-10-CM

## 2021-08-25 DIAGNOSIS — G47.09 OTHER INSOMNIA: ICD-10-CM

## 2021-08-25 DIAGNOSIS — R73.9 HYPERGLYCEMIA: ICD-10-CM

## 2021-08-25 DIAGNOSIS — K21.9 GASTROESOPHAGEAL REFLUX DISEASE WITHOUT ESOPHAGITIS: ICD-10-CM

## 2021-08-25 DIAGNOSIS — E78.5 HYPERLIPIDEMIA, UNSPECIFIED HYPERLIPIDEMIA TYPE: ICD-10-CM

## 2021-08-25 DIAGNOSIS — E66.09 CLASS 2 OBESITY DUE TO EXCESS CALORIES WITHOUT SERIOUS COMORBIDITY WITH BODY MASS INDEX (BMI) OF 35.0 TO 35.9 IN ADULT: ICD-10-CM

## 2021-08-25 DIAGNOSIS — I25.10 CORONARY ARTERY DISEASE INVOLVING NATIVE HEART WITHOUT ANGINA PECTORIS, UNSPECIFIED VESSEL OR LESION TYPE: Primary | ICD-10-CM

## 2021-08-25 DIAGNOSIS — Z96.652 HISTORY OF TOTAL KNEE ARTHROPLASTY, LEFT: ICD-10-CM

## 2021-08-25 PROCEDURE — 99214 OFFICE O/P EST MOD 30 MIN: CPT | Performed by: INTERNAL MEDICINE

## 2021-08-25 NOTE — PROGRESS NOTES
Subjective   Antony Tatum is a 65 y.o. male. Patient is here today for follow-up on his coronary artery disease, hypertension, hyperlipidemia, hyperglycemia, obesity, GERD, insomnia and history of left total knee replacement.  Patient still getting physical therapy for his knee but is improving.  His abdominal pain is intermittent but overall probably improved.  Insomnia is controlled on medication  Chief Complaint   Patient presents with   • Hyperlipidemia     LAB FOLLOW UP    • Hypertension          Vitals:    08/25/21 1413   BP: 124/72   Pulse: 65   Resp: 18   Temp: 97.5 °F (36.4 °C)   SpO2: 99%     Body mass index is 35.13 kg/m².  The following portions of the patient's history were reviewed and updated as appropriate: allergies, current medications, past family history, past medical history, past social history, past surgical history and problem list.    Past Medical History:   Diagnosis Date   • Anxiety    • Coronary artery disease    • Depression    • Diverticulosis    • Erosive gastritis    • Family hx of colon cancer    • H/O CT scan of abdomen 01/29/2014    ddd, tics, tortuosity w/aortoiliac atherosclerosis, HH, cardiac enlargement   • Heart attack (CMS/HCC)    • Hepatitis     HEP C   • Hiatal hernia    • History of CT scan of abdomen    • Hyperlipidemia    • Hypertension    • Obese    • Thoracic aortic aneurysm without rupture (CMS/HCC)       No Known Allergies   Social History     Socioeconomic History   • Marital status:      Spouse name: Not on file   • Number of children: Not on file   • Years of education: Not on file   • Highest education level: Not on file   Tobacco Use   • Smoking status: Never Smoker   • Smokeless tobacco: Never Used   Substance and Sexual Activity   • Alcohol use: Yes     Alcohol/week: 2.0 standard drinks     Types: 2 Shots of liquor per week     Comment: weekly   • Drug use: No   • Sexual activity: Defer        Current Outpatient Medications:   •  aspirin 81 MG  tablet, Take by mouth., Disp: , Rfl:   •  atorvastatin (LIPITOR) 20 MG tablet, Take 1 tablet by mouth Daily., Disp: 90 tablet, Rfl: 3  •  HYDROcodone-acetaminophen (NORCO) 7.5-325 MG per tablet, , Disp: , Rfl:   •  lisinopril (PRINIVIL,ZESTRIL) 5 MG tablet, Take 1 tablet by mouth Daily., Disp: 90 tablet, Rfl: 3  •  pantoprazole (PROTONIX) 40 MG EC tablet, Take 1 tablet by mouth 2 (Two) Times a Day., Disp: 180 tablet, Rfl: 3  •  sucralfate (CARAFATE) 1 g tablet, TAKE 1 TABLET BY MOUTH 4 (FOUR) TIMES A DAY BEFORE MEALS & AT BEDTIME AS NEEDED (ABDOMINAL PAIN)., Disp: 120 tablet, Rfl: 3  •  zolpidem (AMBIEN) 10 MG tablet, TAKE 1 TABLET BY MOUTH AT NIGHT AS NEEDED FOR SLEEP., Disp: 30 tablet, Rfl: 1     Objective     History of Present Illness     Review of Systems   Constitutional: Negative.    HENT: Negative.    Respiratory: Negative.    Cardiovascular: Negative.    Gastrointestinal: Positive for abdominal pain.   Genitourinary: Negative.    Musculoskeletal: Negative.    Skin: Negative.    Neurological: Negative.    Hematological: Negative.    Psychiatric/Behavioral: Negative.        Physical Exam  Vitals and nursing note reviewed.   Constitutional:       General: He is not in acute distress.     Appearance: Normal appearance. He is obese. He is not ill-appearing.   HENT:      Head: Normocephalic and atraumatic.   Eyes:      General: No scleral icterus.     Conjunctiva/sclera: Conjunctivae normal.   Cardiovascular:      Rate and Rhythm: Normal rate and regular rhythm.      Heart sounds: Normal heart sounds.   Pulmonary:      Effort: Pulmonary effort is normal. No respiratory distress.      Breath sounds: Normal breath sounds. No wheezing or rales.   Abdominal:      General: Abdomen is flat.      Palpations: Abdomen is soft.   Musculoskeletal:         General: Normal range of motion.      Cervical back: Normal range of motion and neck supple.      Comments: Healed incision overlying the left knee   Skin:     General:  Skin is warm and dry.   Neurological:      General: No focal deficit present.      Mental Status: He is alert and oriented to person, place, and time.   Psychiatric:         Mood and Affect: Mood normal.         Behavior: Behavior normal.         ASSESSMENT CBC is now normal.  CMP was normal aside from alkaline phosphatase of 141.  Lipid panel is stable with total cholesterol 138, HDL 46, LDL 79.  Hemoglobin A1c is improved to 5.7.  PSA remains low normal and TSH was normal.  #1-history of coronary artery disease, asymptomatic  #2-hypertension, controlled on medication  #3-hyperlipidemia, fair control on medication, patient refuses higher dose of statin  #4-obesity with significant weight loss  #5-intermittent abdominal pain, uncertain etiology  #6-insomnia, stable on medication  #7-continuing recovery from left total knee replacement     Problems Addressed this Visit        Cardiac and Vasculature    Hyperlipidemia    Hypertension    Coronary artery disease - Primary       Endocrine and Metabolic    Obese    Hyperglycemia       Musculoskeletal and Injuries    History of total knee arthroplasty, left       Sleep    Other insomnia      Diagnoses       Codes Comments    Coronary artery disease involving native heart without angina pectoris, unspecified vessel or lesion type    -  Primary ICD-10-CM: I25.10  ICD-9-CM: 414.01     Essential hypertension     ICD-10-CM: I10  ICD-9-CM: 401.9     Hyperlipidemia, unspecified hyperlipidemia type     ICD-10-CM: E78.5  ICD-9-CM: 272.4     Hyperglycemia     ICD-10-CM: R73.9  ICD-9-CM: 790.29     Class 2 obesity due to excess calories without serious comorbidity with body mass index (BMI) of 35.0 to 35.9 in adult     ICD-10-CM: E66.09, Z68.35  ICD-9-CM: 278.00, V85.35     History of total knee arthroplasty, left     ICD-10-CM: Z96.652  ICD-9-CM: V43.65     Other insomnia     ICD-10-CM: G47.09  ICD-9-CM: 780.52           PLAN I recommended the flu shot in October and the shingles  immunizations for the patient.  He refuses a higher dose of the atorvastatin.  He will continue current medicines as now and I plan on rechecking him in 6 months with a wellness visit and a CMP, lipid panel, hemoglobin A1c    There are no Patient Instructions on file for this visit.  Return in about 6 months (around 2/25/2022) for with labs.

## 2021-09-24 DIAGNOSIS — G47.09 OTHER INSOMNIA: ICD-10-CM

## 2021-09-24 RX ORDER — ZOLPIDEM TARTRATE 10 MG/1
10 TABLET ORAL NIGHTLY PRN
Qty: 30 TABLET | Refills: 4 | Status: SHIPPED | OUTPATIENT
Start: 2021-09-24 | End: 2022-10-26 | Stop reason: SDUPTHER

## 2021-11-18 ENCOUNTER — TELEPHONE (OUTPATIENT)
Dept: GASTROENTEROLOGY | Facility: CLINIC | Age: 66
End: 2021-11-18

## 2021-11-18 ENCOUNTER — OFFICE VISIT (OUTPATIENT)
Dept: GASTROENTEROLOGY | Facility: CLINIC | Age: 66
End: 2021-11-18

## 2021-11-18 VITALS — TEMPERATURE: 96.9 F | BODY MASS INDEX: 35.31 KG/M2 | HEIGHT: 68 IN | WEIGHT: 233 LBS

## 2021-11-18 DIAGNOSIS — K21.9 GASTROESOPHAGEAL REFLUX DISEASE WITHOUT ESOPHAGITIS: ICD-10-CM

## 2021-11-18 DIAGNOSIS — R10.13 EPIGASTRIC PAIN: Primary | ICD-10-CM

## 2021-11-18 PROCEDURE — 99214 OFFICE O/P EST MOD 30 MIN: CPT | Performed by: INTERNAL MEDICINE

## 2021-11-18 RX ORDER — OMEPRAZOLE 40 MG/1
40 CAPSULE, DELAYED RELEASE ORAL
Qty: 60 CAPSULE | Refills: 3 | Status: SHIPPED | OUTPATIENT
Start: 2021-11-18 | End: 2022-01-06

## 2021-11-18 NOTE — PROGRESS NOTES
Subjective   Chief Complaint   Patient presents with   • Heartburn   • Constipation       Antony Tatum is a  65 y.o. male here for a follow up visit for GERD and constipation.     He has had significant issues with jeniffer pain dating back to just prior to his knee surgery in the spring.  Reports that he was having epigastric pain in the mid abdomen.  It was unrelenting.  He tried heartburn medication once a day he was taking Carafate without relief.  He had an overnight admission after his surgery that was unexpected due to issues with anesthesia and had significant abdominal pain thereafter.  He underwent EGD and colonoscopy performed by Dr. Watkins that were unremarkable.  He had an MRCP that was unremarkable.  H. pylori CLOtest was negative.  He had a CT of the abdomen and pelvis that failed to reveal the etiology of his symptoms.  Pancreatic enzymes and LFTs have been normal.  The pain has improved.  He notices when he eats a large meal is definitely worse.  It is worse when he drinks alcohol, eats citrus, beef or fried or fatty foods.  Carafate does not really seem to help and he is no longer taking this.  He has struggled episodically with constipation.  He tries to eat a high-fiber diet and his wife gives him fiber bars.  He has not seen any blood in stool.  He was heme positive prior to his colonoscopy.  He takes Percocet a half a tablet about 3 nights a week to help him sleep.  He is really try to back off of this because he felt like it was making it worse.    He has had significant issues in the past with acid reflux which been difficult to control previously.  He actually underwent a gastric emptying test in 2016 due to refractory acid reflux at that time.  He has lost 30 pounds since the spring.  HPI  Past Medical History:   Diagnosis Date   • Anxiety    • Coronary artery disease    • Depression    • Diverticulosis    • Erosive gastritis    • Family hx of colon cancer    • H/O CT scan of abdomen  01/29/2014    ddd, tics, tortuosity w/aortoiliac atherosclerosis, HH, cardiac enlargement   • Heart attack (HCC)    • Hepatitis     HEP C   • Hiatal hernia    • History of CT scan of abdomen    • Hyperlipidemia    • Hypertension    • Obese    • Thoracic aortic aneurysm without rupture (HCC)      Past Surgical History:   Procedure Laterality Date   • CERVICAL DISCECTOMY ANTERIOR      anterior spinal, osteophytectomy cerv interspace   • CERVICAL DISCECTOMY ANTERIOR  12/07/2015    spinal; C5-C6 and C6-C7 anterior cervical discectomy and arthrodesis; Jno Lua   • CHOLECYSTECTOMY     • COLONOSCOPY  06/10/2016    himanshu Sterling M.D.   • CORONARY ANGIOPLASTY WITH STENT PLACEMENT     • CORONARY ANGIOPLASTY WITH STENT PLACEMENT N/A     10yrs ago with Dr Ruff at [facility]; debatable at whether he had an MI that admission   • ENDOSCOPY N/A 12/28/2016    Medium sized HH, gastritis.     • GALLBLADDER SURGERY     • UPPER GASTROINTESTINAL ENDOSCOPY  02/12/2014    LA Grade A reflux esophagitis, HH, erosive gtastritis, bx       Current Outpatient Medications:   •  aspirin 81 MG tablet, Take by mouth., Disp: , Rfl:   •  atorvastatin (LIPITOR) 20 MG tablet, Take 1 tablet by mouth Daily., Disp: 90 tablet, Rfl: 3  •  HYDROcodone-acetaminophen (NORCO) 7.5-325 MG per tablet, , Disp: , Rfl:   •  lisinopril (PRINIVIL,ZESTRIL) 5 MG tablet, Take 1 tablet by mouth Daily., Disp: 90 tablet, Rfl: 3  •  zolpidem (AMBIEN) 10 MG tablet, TAKE 1 TABLET BY MOUTH AT NIGHT AS NEEDED FOR SLEEP., Disp: 30 tablet, Rfl: 4  •  omeprazole (priLOSEC) 40 MG capsule, Take 1 capsule by mouth 2 (Two) Times a Day Before Meals., Disp: 60 capsule, Rfl: 3  •  sucralfate (CARAFATE) 1 g tablet, TAKE 1 TABLET BY MOUTH 4 (FOUR) TIMES A DAY BEFORE MEALS & AT BEDTIME AS NEEDED (ABDOMINAL PAIN)., Disp: 120 tablet, Rfl: 3  PRN Meds:.  No Known Allergies  Social History     Socioeconomic History   • Marital status:    Tobacco Use   • Smoking status: Never  Smoker   • Smokeless tobacco: Never Used   Substance and Sexual Activity   • Alcohol use: Yes     Alcohol/week: 2.0 standard drinks     Types: 2 Shots of liquor per week     Comment: weekly   • Drug use: No   • Sexual activity: Defer     Family History   Problem Relation Age of Onset   • Colon cancer Mother    • Diabetes Mother    • Hypertension Mother    • Breast cancer Sister      Review of Systems   Constitutional: Positive for unexpected weight change. Negative for appetite change.   Gastrointestinal: Positive for abdominal pain and constipation. Negative for blood in stool, nausea and vomiting.     Vitals:    11/18/21 1027   Temp: 96.9 °F (36.1 °C)         11/18/21  1027   Weight: 106 kg (233 lb)       Objective   Physical Exam  Constitutional:       Appearance: He is well-developed.   HENT:      Head: Normocephalic and atraumatic.   Eyes:      General: No scleral icterus.  Abdominal:      General: There is no distension.      Palpations: Abdomen is soft. There is no mass.      Tenderness: There is no abdominal tenderness.   Skin:     General: Skin is warm and dry.   Neurological:      Mental Status: He is alert.       No radiology results for the last 7 days    Assessment/Plan   Diagnoses and all orders for this visit:    Epigastric pain  -     NM gastric emptying; Future    Gastroesophageal reflux disease without esophagitis    Other orders  -     omeprazole (priLOSEC) 40 MG capsule; Take 1 capsule by mouth 2 (Two) Times a Day Before Meals.      Plan:  · Exhaustive negative work-up to date, extensive records reviewed, including EGD, colonoscopy, CT, MRCP.  He also had a cardiac stress test.  Symptoms are better overall but he continues to have discomfort.  Will give him a trial of an alternate PPI.  He has been on pantoprazole once daily with persistent issues.  Will try Prilosec.  May need a PA.  In the meantime we will were waiting for insurance approval of given him some samples of Dexilant which she can  use once a day.  We discussed diet modification at length including small meals, avoidance of trigger foods which he has identified as dairy, alcohol, fried foods and beef.  · He would benefit from daily fiber supplement to keep his bowels moving and to prevent constipation from worsening of symptoms      > 35 minutes spent participating in patient care including record review, administrative tasks, patient counseling and exam

## 2021-11-18 NOTE — TELEPHONE ENCOUNTER
----- Message from Susi Nolen MD sent at 11/18/2021 12:52 PM EST -----  I have tentatively scheduled him for an appointment on January 6-please make sure that this time is okay with him.  Please also let him know that I think he needs to take a fiber supplement daily.  Would recommend Benefiber.  This would replace his fiber bar and would allow us to more consistently and reliably help his constipation.

## 2021-11-19 ENCOUNTER — TELEPHONE (OUTPATIENT)
Dept: GASTROENTEROLOGY | Facility: CLINIC | Age: 66
End: 2021-11-19

## 2021-11-19 NOTE — TELEPHONE ENCOUNTER
----- Message from Antony Tatum sent at 11/19/2021  2:07 PM EST -----  Regarding: two new prescriptions  Dr. Nolen, I left your office the other day with a months worth of Dexilant (30 pills). I recently picked up from my pharmacy a months worth of Omeprazole (60 pills). Did you want me to take the Dexilant first and when it ran out then to switch to the Omeprazole, or do you want me to take them both together.  Thanks, Antony Tatum

## 2021-11-23 ENCOUNTER — TELEPHONE (OUTPATIENT)
Dept: GASTROENTEROLOGY | Facility: CLINIC | Age: 66
End: 2021-11-23

## 2021-11-23 NOTE — TELEPHONE ENCOUNTER
Overdue alert received for GES.     Call to pt.  Advise may contact [facility] to arrange.  If excessive wait, may wish to complete at [facility].  States would like to have done at [facility].  Advise will send order, and then call [place] [facility] @ [phone] to arrange.  Verb understanding.     GES order faxed to [phone] - confirmation received.  See media tab.

## 2021-11-29 RX ORDER — LISINOPRIL 5 MG/1
TABLET ORAL
Qty: 90 TABLET | Refills: 3 | Status: SHIPPED | OUTPATIENT
Start: 2021-11-29 | End: 2022-04-25 | Stop reason: SDUPTHER

## 2021-12-15 ENCOUNTER — TELEPHONE (OUTPATIENT)
Dept: GASTROENTEROLOGY | Facility: CLINIC | Age: 66
End: 2021-12-15

## 2021-12-15 NOTE — TELEPHONE ENCOUNTER
"----- Message from Antony Tatum sent at 12/14/2021  1:50 PM EST -----  Regarding: scheduling a test  Dr. Nolen requested that I get a \"stomach emptying\" test. For several weeks, I've been trying to get that scheduled with no luck. I believe I spoke to Sindy and she gave me a phone number at [facility] to call. I've not been able to reach anybody and left messages for them to call me ...so far, no luck getting this procedure scheduled. Your help would be appreciated.  Antony Tatum  [phone]  "

## 2021-12-15 NOTE — TELEPHONE ENCOUNTER
Call to [facility] @ [phone] - on hold for extended time, and then offer to leave VM.     Call to pt.  Advise attempted contact [facility] as well without success.  May contact [facility] to arrange.  Verb understanding.

## 2022-01-06 ENCOUNTER — OFFICE VISIT (OUTPATIENT)
Dept: GASTROENTEROLOGY | Facility: CLINIC | Age: 67
End: 2022-01-06

## 2022-01-06 VITALS — BODY MASS INDEX: 35.61 KG/M2 | WEIGHT: 235 LBS | HEIGHT: 68 IN

## 2022-01-06 DIAGNOSIS — K21.9 GASTROESOPHAGEAL REFLUX DISEASE WITHOUT ESOPHAGITIS: Primary | ICD-10-CM

## 2022-01-06 DIAGNOSIS — R10.13 EPIGASTRIC PAIN: ICD-10-CM

## 2022-01-06 PROCEDURE — 99442 PR PHYS/QHP TELEPHONE EVALUATION 11-20 MIN: CPT | Performed by: INTERNAL MEDICINE

## 2022-01-06 RX ORDER — DEXLANSOPRAZOLE 60 MG/1
60 CAPSULE, DELAYED RELEASE ORAL DAILY
Qty: 30 CAPSULE | Refills: 5 | Status: SHIPPED | OUTPATIENT
Start: 2022-01-06 | End: 2022-02-05

## 2022-01-06 RX ORDER — TRAMADOL HYDROCHLORIDE 50 MG/1
1 TABLET ORAL EVERY 8 HOURS PRN
COMMUNITY
Start: 2021-12-27 | End: 2022-10-26

## 2022-01-06 NOTE — PROGRESS NOTES
Patient has consented to proceed with a telehealth (telephone) visit in lieu of an office visit given the coronavirus epidemic.    This encounter was provided via real-time audio/video technology.    Subjective     History of present illness:    66 y.o. male for f/u of heartburn, epigastric pain.      He has had significant issues with abdominal pain dating back to just prior to his knee surgery in the spring.  Reports that he was having epigastric pain in the mid abdomen.  It was unrelenting.  He tried heartburn medication once a day he was taking Carafate without relief.  He had an overnight admission after his surgery that was unexpected due to issues with anesthesia and had significant abdominal pain thereafter.  He underwent EGD and colonoscopy performed by Dr. Watknis that were unremarkable.  He had an MRCP that was unremarkable.  H. pylori CLOtest was negative.  He had a CT of the abdomen and pelvis that failed to reveal the etiology of his symptoms.  Pancreatic enzymes and LFTs have been normal.  The pain has improved.  He notices when he eats a large meal is definitely worse.  It is worse when he drinks alcohol, eats citrus, beef or fried or fatty foods.  Carafate does not really seem to help and he is no longer taking this.  He has struggled episodically with constipation.  He tries to eat a high-fiber diet and his wife gives him fiber bars.  He has not seen any blood in stool.  He was heme positive prior to his colonoscopy.  He takes Percocet a half a tablet about 3 nights a week to help him sleep.  He is really try to back off of this because he felt like it was making it worse.    He has had significant issues in the past with acid reflux which been difficult to control previously.  He actually underwent a gastric emptying test in 2016 due to refractory acid reflux at that time.  He has lost 30 pounds since the spring.    Constipation has resolved - he did try fiber.  It seems to be sporadic and  related to his pain meds.  He felt like the dexilant worked really well.he tried samples.   The omeprazole has not worked any better than pantoprazole.  He still has some stomach issues in the morning. Happens when he wakes up after eating.  Not every morning.  He is eating and watching his diet.  Beef still seems to bother him as do fried foods.  He takes pepcid complete as needed for heartburn with good relief.  No pain with exertion.    Past Medical History:  Past Medical History:   Diagnosis Date   • Anxiety    • Coronary artery disease    • Depression    • Diverticulosis    • Erosive gastritis    • Family hx of colon cancer    • H/O CT scan of abdomen 01/29/2014    ddd, tics, tortuosity w/aortoiliac atherosclerosis, HH, cardiac enlargement   • Heart attack (HCC)    • Hepatitis     HEP C   • Hiatal hernia    • History of CT scan of abdomen    • Hyperlipidemia    • Hypertension    • Obese    • Thoracic aortic aneurysm without rupture (HCC)      Past Surgical History:  Past Surgical History:   Procedure Laterality Date   • CERVICAL DISCECTOMY ANTERIOR      anterior spinal, osteophytectomy cerv interspace   • CERVICAL DISCECTOMY ANTERIOR  12/07/2015    spinal; C5-C6 and C6-C7 anterior cervical discectomy and arthrodesis; Jon Lua   • CHOLECYSTECTOMY     • COLONOSCOPY  06/10/2016    himanshu Sterling M.D.   • CORONARY ANGIOPLASTY WITH STENT PLACEMENT     • CORONARY ANGIOPLASTY WITH STENT PLACEMENT N/A     10yrs ago with Dr Ruff at [facility]; debatable at whether he had an MI that admission   • ENDOSCOPY N/A 12/28/2016    Medium sized HH, gastritis.     • GALLBLADDER SURGERY     • UPPER GASTROINTESTINAL ENDOSCOPY  02/12/2014    LA Grade A reflux esophagitis, HH, erosive gtastritis, bx      Social History:   Social History     Tobacco Use   • Smoking status: Never Smoker   • Smokeless tobacco: Never Used   Substance Use Topics   • Alcohol use: Yes     Alcohol/week: 2.0 standard drinks     Types: 2 Shots of  liquor per week     Comment: weekly      Family History:  Family History   Problem Relation Age of Onset   • Colon cancer Mother    • Diabetes Mother    • Hypertension Mother    • Breast cancer Sister        Home Meds:    Prior to Admission medications    Medication Sig Start Date End Date Taking? Authorizing Provider   aspirin 81 MG tablet Take by mouth. 7/9/15  Yes Peter Bell MD   atorvastatin (LIPITOR) 20 MG tablet Take 1 tablet by mouth Daily. 1/27/21  Yes Som Justice MD   HYDROcodone-acetaminophen (NORCO) 7.5-325 MG per tablet  4/28/21  Yes Peter Bell MD   lisinopril (PRINIVIL,ZESTRIL) 5 MG tablet TAKE 1 TABLET BY MOUTH EVERY DAY 11/29/21  Yes Som Justice MD   omeprazole (priLOSEC) 40 MG capsule Take 1 capsule by mouth 2 (Two) Times a Day Before Meals. 11/18/21  Yes Susi Nolne MD   sucralfate (CARAFATE) 1 g tablet TAKE 1 TABLET BY MOUTH 4 (FOUR) TIMES A DAY BEFORE MEALS & AT BEDTIME AS NEEDED (ABDOMINAL PAIN). 5/4/21  Yes Tanvi Tellez APRN   traMADol (ULTRAM) 50 MG tablet Take 1 tablet by mouth Every 8 (Eight) Hours As Needed. 12/27/21  Yes Peter Bell MD   zolpidem (AMBIEN) 10 MG tablet TAKE 1 TABLET BY MOUTH AT NIGHT AS NEEDED FOR SLEEP. 9/24/21  Yes Som Justice MD     Allergies:  No Known Allergies  Review of Systems  Pertinent items are noted in HPI, all other systems reviewed and negative     Objective         Assessment/Plan   Patient Active Problem List   Diagnosis   • Carpal tunnel syndrome   • Displacement of intervertebral disc of cervical region   • Cervical radiculopathy   • Spinal stenosis of cervical region   • Chronic neck pain   • Degeneration of intervertebral disc of cervical region   • Hyperlipidemia   • Hypertension   • Obese   • Coronary artery disease   • GERD (gastroesophageal reflux disease)   • Testosterone deficiency in male   • Family history of colon cancer   • Breast tenderness in male   • Dermatitis   •  Thoracic aortic aneurysm without rupture (HCC)   • Chronic right shoulder pain   • Skin lesion   • Other insomnia   • Hyperglycemia   • Epigastric pain   • Gastroesophageal reflux disease without esophagitis   • History of acute gastritis   • NSAID long-term use   • History of total knee arthroplasty, left       Assessment:  1. GERD, refractory  2. Constipation - episodic, related to pain meds    Plan:  · He has failed both omeprazole and pantoprazole - bhe tried dexilant samples and this was much better - will send rx to pharmacy.  He would prefer pantoprazole if dexilant denied  · Continue pepcid prn for breakthrough  · GES is scheduled - will f/u results    Time of call was 11 minutes      Susi Nolen MD

## 2022-02-07 ENCOUNTER — HOSPITAL ENCOUNTER (OUTPATIENT)
Dept: NUCLEAR MEDICINE | Facility: HOSPITAL | Age: 67
Discharge: HOME | End: 2022-02-07

## 2022-02-07 DIAGNOSIS — R10.13 EPIGASTRIC PAIN: ICD-10-CM

## 2022-02-07 PROCEDURE — 78264 GASTRIC EMPTYING IMG STUDY: CPT

## 2022-02-07 PROCEDURE — A9541 TC99M SULFUR COLLOID: HCPCS | Performed by: INTERNAL MEDICINE

## 2022-02-07 PROCEDURE — 0 TECHNETIUM SULFUR COLLOID: Performed by: INTERNAL MEDICINE

## 2022-02-07 RX ADMIN — TECHNETIUM TC 99M SULFUR COLLOID 1 DOSE: KIT at 07:14

## 2022-02-25 DIAGNOSIS — R73.9 HYPERGLYCEMIA: ICD-10-CM

## 2022-02-25 DIAGNOSIS — E78.5 HYPERLIPIDEMIA, UNSPECIFIED HYPERLIPIDEMIA TYPE: Primary | ICD-10-CM

## 2022-03-03 LAB
ALBUMIN SERPL-MCNC: 4.7 G/DL (ref 3.8–4.8)
ALBUMIN/GLOB SERPL: 2.1 {RATIO} (ref 1.2–2.2)
ALP SERPL-CCNC: 123 IU/L (ref 44–121)
ALT SERPL-CCNC: 20 IU/L (ref 0–44)
AST SERPL-CCNC: 21 IU/L (ref 0–40)
BILIRUB SERPL-MCNC: 0.6 MG/DL (ref 0–1.2)
BUN SERPL-MCNC: 19 MG/DL (ref 8–27)
BUN/CREAT SERPL: 27 (ref 10–24)
CALCIUM SERPL-MCNC: 9.2 MG/DL (ref 8.6–10.2)
CHLORIDE SERPL-SCNC: 100 MMOL/L (ref 96–106)
CHOLEST SERPL-MCNC: 148 MG/DL (ref 100–199)
CO2 SERPL-SCNC: 25 MMOL/L (ref 20–29)
CREAT SERPL-MCNC: 0.71 MG/DL (ref 0.76–1.27)
EGFR GENE MUT ANL BLD/T: 100 ML/MIN/1.73
GLOBULIN SER CALC-MCNC: 2.2 G/DL (ref 1.5–4.5)
GLUCOSE SERPL-MCNC: 80 MG/DL (ref 65–99)
HBA1C MFR BLD: 6 % (ref 4.8–5.6)
HDLC SERPL-MCNC: 54 MG/DL
LDLC SERPL CALC-MCNC: 74 MG/DL (ref 0–99)
LDLC/HDLC SERPL: 1.4 RATIO (ref 0–3.6)
POTASSIUM SERPL-SCNC: 4.4 MMOL/L (ref 3.5–5.2)
PROT SERPL-MCNC: 6.9 G/DL (ref 6–8.5)
SODIUM SERPL-SCNC: 140 MMOL/L (ref 134–144)
TRIGL SERPL-MCNC: 113 MG/DL (ref 0–149)
VLDLC SERPL CALC-MCNC: 20 MG/DL (ref 5–40)

## 2022-03-17 ENCOUNTER — OFFICE VISIT (OUTPATIENT)
Dept: FAMILY MEDICINE CLINIC | Facility: CLINIC | Age: 67
End: 2022-03-17

## 2022-03-17 VITALS
SYSTOLIC BLOOD PRESSURE: 120 MMHG | HEART RATE: 66 BPM | BODY MASS INDEX: 37.35 KG/M2 | HEIGHT: 68 IN | WEIGHT: 246.4 LBS | DIASTOLIC BLOOD PRESSURE: 72 MMHG | TEMPERATURE: 97.8 F | RESPIRATION RATE: 18 BRPM | OXYGEN SATURATION: 94 %

## 2022-03-17 DIAGNOSIS — K21.9 GASTROESOPHAGEAL REFLUX DISEASE WITHOUT ESOPHAGITIS: ICD-10-CM

## 2022-03-17 DIAGNOSIS — G47.09 OTHER INSOMNIA: ICD-10-CM

## 2022-03-17 DIAGNOSIS — I10 PRIMARY HYPERTENSION: ICD-10-CM

## 2022-03-17 DIAGNOSIS — Z12.5 SCREENING PSA (PROSTATE SPECIFIC ANTIGEN): ICD-10-CM

## 2022-03-17 DIAGNOSIS — R73.9 HYPERGLYCEMIA: ICD-10-CM

## 2022-03-17 DIAGNOSIS — E66.09 CLASS 2 OBESITY DUE TO EXCESS CALORIES WITHOUT SERIOUS COMORBIDITY WITH BODY MASS INDEX (BMI) OF 37.0 TO 37.9 IN ADULT: ICD-10-CM

## 2022-03-17 DIAGNOSIS — I25.10 CORONARY ARTERY DISEASE INVOLVING NATIVE HEART WITHOUT ANGINA PECTORIS, UNSPECIFIED VESSEL OR LESION TYPE: Primary | ICD-10-CM

## 2022-03-17 DIAGNOSIS — E78.5 HYPERLIPIDEMIA, UNSPECIFIED HYPERLIPIDEMIA TYPE: ICD-10-CM

## 2022-03-17 PROCEDURE — 1159F MED LIST DOCD IN RCRD: CPT | Performed by: INTERNAL MEDICINE

## 2022-03-17 PROCEDURE — 1170F FXNL STATUS ASSESSED: CPT | Performed by: INTERNAL MEDICINE

## 2022-03-17 PROCEDURE — G0438 PPPS, INITIAL VISIT: HCPCS | Performed by: INTERNAL MEDICINE

## 2022-03-17 PROCEDURE — 99214 OFFICE O/P EST MOD 30 MIN: CPT | Performed by: INTERNAL MEDICINE

## 2022-03-17 RX ORDER — PANTOPRAZOLE SODIUM 40 MG/1
40 TABLET, DELAYED RELEASE ORAL 2 TIMES DAILY
COMMUNITY
End: 2022-07-05 | Stop reason: SDUPTHER

## 2022-03-17 RX ORDER — ATORVASTATIN CALCIUM 20 MG/1
20 TABLET, FILM COATED ORAL DAILY
Qty: 90 TABLET | Refills: 3 | Status: SHIPPED | OUTPATIENT
Start: 2022-03-17 | End: 2022-10-26 | Stop reason: SDUPTHER

## 2022-03-17 NOTE — PROGRESS NOTES
The ABCs of the Annual Wellness Visit  Initial Medicare Wellness Visit    Chief Complaint   Patient presents with   • Medicare Wellness-Initial Visit     PT HERE FOR AWV AND F/U LABS     Subjective   History of Present Illness:  Antony Tatum is a 66 y.o. male who presents for an Initial Medicare Wellness Visit.  He is also here for follow-up on his coronary artery disease, hypertension, hyperlipidemia, hyperglycemia, obesity and GERD and abdominal upset.  He also has some occasional insomnia.  He is generally been stable and has no acute complaints and is followed by gastroenterology.    The following portions of the patient's history were reviewed and   updated as appropriate: allergies, current medications, past family history, past medical history, past social history, past surgical history and problem list.     Compared to one year ago, the patient feels his physical   health is better.    Compared to one year ago, the patient feels his mental   health is the same.    Recent Hospitalizations:  He was not admitted to the hospital during the last year.       Current Medical Providers:  Patient Care Team:  Som Justice MD as PCP - General (Internal Medicine)  Luis Ruff MD as Consulting Physician (Cardiology)  Shiloh Judd MD as Consulting Physician (Hand Surgery)    Outpatient Medications Prior to Visit   Medication Sig Dispense Refill   • aspirin 81 MG tablet Take by mouth.     • atorvastatin (LIPITOR) 20 MG tablet Take 1 tablet by mouth Daily. 90 tablet 3   • HYDROcodone-acetaminophen (NORCO) 7.5-325 MG per tablet      • lisinopril (PRINIVIL,ZESTRIL) 5 MG tablet TAKE 1 TABLET BY MOUTH EVERY DAY 90 tablet 3   • pantoprazole (PROTONIX) 40 MG EC tablet Take 40 mg by mouth Daily.     • traMADol (ULTRAM) 50 MG tablet Take 1 tablet by mouth Every 8 (Eight) Hours As Needed.     • zolpidem (AMBIEN) 10 MG tablet TAKE 1 TABLET BY MOUTH AT NIGHT AS NEEDED FOR SLEEP. 30 tablet 4     No  "facility-administered medications prior to visit.       Opioid medication/s are on active medication list.  and I have evaluated his active treatment plan and pain score trends (see table).  There were no vitals filed for this visit.  I have reviewed the chart for potential of high risk medication and harmful drug interactions in the elderly.            Aspirin is on active medication list. Aspirin use is indicated based on review of current medical condition/s. Pros and cons of this therapy have been discussed today. Benefits of this medication outweigh potential harm.  Patient has been encouraged to continue taking this medication.  .      Patient Active Problem List   Diagnosis   • Carpal tunnel syndrome   • Displacement of intervertebral disc of cervical region   • Cervical radiculopathy   • Spinal stenosis of cervical region   • Chronic neck pain   • Degeneration of intervertebral disc of cervical region   • Hyperlipidemia   • Hypertension   • Obese   • Coronary artery disease   • GERD (gastroesophageal reflux disease)   • Testosterone deficiency in male   • Family history of colon cancer   • Breast tenderness in male   • Dermatitis   • Thoracic aortic aneurysm without rupture (HCC)   • Chronic right shoulder pain   • Skin lesion   • Other insomnia   • Hyperglycemia   • Epigastric pain   • Gastroesophageal reflux disease without esophagitis   • History of acute gastritis   • NSAID long-term use   • History of total knee arthroplasty, left     Advance Care Planning  Advance Directive is not on file.  ACP discussion was held with the patient during this visit. Patient has an advance directive (not in EMR), copy requested.    Review of Systems   All other systems reviewed and are negative.        Objective       Vitals:    03/17/22 1258   BP: 120/72   Pulse: 66   Resp: 18   Temp: 97.8 °F (36.6 °C)   TempSrc: Oral   SpO2: 94%   Weight: 112 kg (246 lb 6.4 oz)   Height: 172.7 cm (67.99\")     BMI Readings from Last 1 " Encounters:   03/17/22 37.47 kg/m²   BMI is above normal parameters. Recommendations include: exercise counseling    Does the patient have evidence of cognitive impairment? No    Physical Exam  Vitals and nursing note reviewed.   Constitutional:       General: He is not in acute distress.     Appearance: Normal appearance. He is obese. He is not ill-appearing.   Cardiovascular:      Rate and Rhythm: Normal rate and regular rhythm.      Heart sounds: Normal heart sounds.   Pulmonary:      Effort: Pulmonary effort is normal. No respiratory distress.      Breath sounds: Normal breath sounds. No wheezing or rales.   Musculoskeletal:         General: Normal range of motion.   Skin:     General: Skin is warm and dry.   Neurological:      General: No focal deficit present.      Mental Status: He is alert and oriented to person, place, and time.   Psychiatric:         Mood and Affect: Mood normal.         Behavior: Behavior normal.       Lab Results   Component Value Date    CHLPL 148 03/02/2022    TRIG 113 03/02/2022    HDL 54 03/02/2022    LDL 74 03/02/2022    VLDL 20 03/02/2022    HGBA1C 6.0 (H) 03/02/2022          HEALTH RISK ASSESSMENT    Smoking Status:  Social History     Tobacco Use   Smoking Status Never Smoker   Smokeless Tobacco Never Used     Alcohol Consumption:  Social History     Substance and Sexual Activity   Alcohol Use Yes   • Alcohol/week: 2.0 standard drinks   • Types: 2 Shots of liquor per week    Comment: weekly     Fall Risk Screen:    XAVIER Fall Risk Assessment was completed, and patient is at LOW risk for falls.Assessment completed on:3/17/2022    Depression Screen:   PHQ-2/PHQ-9 Depression Screening 3/17/2022   Retired Total Score -   Little Interest or Pleasure in Doing Things 0-->not at all   Feeling Down, Depressed or Hopeless 0-->not at all   PHQ-9: Brief Depression Severity Measure Score 0       Health Habits and Functional and Cognitive Screening:  Functional & Cognitive Status 3/17/2022    Do you have difficulty preparing food and eating? No   Do you have difficulty bathing yourself, getting dressed or grooming yourself? No   Do you have difficulty using the toilet? No   Do you have difficulty moving around from place to place? No   Do you have trouble with steps or getting out of a bed or a chair? Yes   Current Diet Well Balanced Diet   Dental Exam Up to date   Eye Exam Up to date   Exercise (times per week) 3 times per week   Current Exercises Include Other   Do you need help using the phone?  No   Are you deaf or do you have serious difficulty hearing?  No   Do you need help with transportation? No   Do you need help shopping? No   Do you need help preparing meals?  No   Do you need help with housework?  No   Do you need help with laundry? No   Do you need help taking your medications? No   Do you need help managing money? No   Do you ever drive or ride in a car without wearing a seat belt? No   Have you felt unusual stress, anger or loneliness in the last month? No   Who do you live with? Spouse   If you need help, do you have trouble finding someone available to you? No   Have you been bothered in the last four weeks by sexual problems? No   Do you have difficulty concentrating, remembering or making decisions? No       Age-appropriate Screening Schedule:  Refer to the list below for future screening recommendations based on patient's age, sex and/or medical conditions. Orders for these recommended tests are listed in the plan section. The patient has been provided with a written plan.    Health Maintenance   Topic Date Due   • ZOSTER VACCINE (2 of 2) 10/25/2017   • TDAP/TD VACCINES (1 - Tdap) 01/02/2023 (Originally 12/9/1974)   • LIPID PANEL  03/02/2023   • INFLUENZA VACCINE  Completed            Assessment/Plan CMP was normal aside from alkaline phosphatase of 123 and hemoglobin A1c was a bit higher but acceptable at 6.0.  Lipid panel had total cholesterol 148, HDL 54 and LDL 74  #1-coronary  artery disease, asymptomatic  #2-hypertension, well controlled  #3-hyperlipidemia, controlled on medication  #4-hyperglycemia with normal sugar and stable acceptable hemoglobin A1c, diet controlled  #5-GERD, stable on medications    CMS Preventative Services Quick Reference  Risk Factors Identified During Encounter  Immunizations Discussed/Encouraged (specific Immunizations; Shingrix  Obesity/Overweight   The above risks/problems have been discussed with the patient.  Follow up actions/plans if indicated are seen below in the Assessment/Plan Section.  Pertinent information has been shared with the patient in the After Visit Summary.    There are no diagnoses linked to this encounter.    Follow Up: The patient will continue current medicines as now.  I recommended he continue to try and lose weight over the summer.  Also recommended he get the shingles immunizations.  I plan on rechecking him in 6 months with laboratory studies    No follow-ups on file.     An After Visit Summary and PPPS were made available to the patient.

## 2022-04-25 ENCOUNTER — TELEPHONE (OUTPATIENT)
Dept: FAMILY MEDICINE CLINIC | Facility: CLINIC | Age: 67
End: 2022-04-25

## 2022-04-25 RX ORDER — LISINOPRIL 5 MG/1
5 TABLET ORAL DAILY
Qty: 90 TABLET | Refills: 3 | Status: SHIPPED | OUTPATIENT
Start: 2022-04-25 | End: 2022-10-26 | Stop reason: SDUPTHER

## 2022-04-25 NOTE — TELEPHONE ENCOUNTER
Caller: Deepti, Antony CAMPOS    Relationship: Self    Best call back number: [phone]       Requested Prescriptions:   Requested Prescriptions     Pending Prescriptions Disp Refills   • lisinopril (PRINIVIL,ZESTRIL) 5 MG tablet 90 tablet 3     Sig: Take 1 tablet by mouth Daily.        Pharmacy where request should be sent: [facility]/PHARMACY #[id number] [place], [place] - [address] [facility]. AT [facility] [phone] [facility] [phone] FX     Additional details provided by patient: PATIENT HAS 4 TABLETS LEFT     Does the patient have less than a 3 day supply:  [] Yes  [x] No    Donovan Wei Rep   04/25/22 11:20 EDT

## 2022-07-05 ENCOUNTER — TELEPHONE (OUTPATIENT)
Dept: GASTROENTEROLOGY | Facility: CLINIC | Age: 67
End: 2022-07-05

## 2022-07-05 RX ORDER — PANTOPRAZOLE SODIUM 40 MG/1
40 TABLET, DELAYED RELEASE ORAL 2 TIMES DAILY
Qty: 180 TABLET | Refills: 1 | Status: SHIPPED | OUTPATIENT
Start: 2022-07-05 | End: 2022-12-14

## 2022-07-05 NOTE — TELEPHONE ENCOUNTER
Caller: Antony Tatum    Relationship: Self    Best call back number: [phone]    Requested Prescriptions:   Requested Prescriptions      No prescriptions requested or ordered in this encounter    Pantoprazole / Protonix, 40 MG / 180 pills. If it hasn't been ok'd or called in to my Pharmacy ([facility]/ [phone])          Additional details provided by patient: PT ONLY HAS ONE DAY LEFT     Does the patient have less than a 3 day supply:  [] Yes  [x] No    Donovan TINEO Rep   07/05/22 16:02 EDT

## 2022-07-12 RX ORDER — SILDENAFIL 100 MG/1
100 TABLET, FILM COATED ORAL DAILY PRN
Qty: 10 TABLET | Refills: 5 | Status: SHIPPED | OUTPATIENT
Start: 2022-07-12 | End: 2022-07-18 | Stop reason: SDUPTHER

## 2022-07-15 NOTE — TELEPHONE ENCOUNTER
PT CAME IN TO REQUEST sildenafil (Viagra) 100 MG tablet [61009] BE SENT OVER TO THE [facility] PHARMACY LISTED IN HIS CHART FOR INSURANCE PURPOSES.

## 2022-07-18 RX ORDER — SILDENAFIL 100 MG/1
100 TABLET, FILM COATED ORAL DAILY PRN
Qty: 10 TABLET | Refills: 1 | Status: SHIPPED | OUTPATIENT
Start: 2022-07-18 | End: 2023-02-01

## 2022-07-18 NOTE — TELEPHONE ENCOUNTER
Caller: Deepti, Antony W    Relationship: Self    Best call back number: [phone]     Requested Prescriptions:   Requested Prescriptions     Pending Prescriptions Disp Refills   • sildenafil (Viagra) 100 MG tablet 10 tablet 5     Sig: Take 1 tablet by mouth Daily As Needed for Erectile Dysfunction.        Pharmacy where request should be sent: [facility] [address] [place] [address] [id number] [place] [address] [phone] [facility] [phone] FX     Additional details provided by patient: COMPLETELY OUT, WANTS IT SENT TO [facility]    Does the patient have less than a 3 day supply:  [x] Yes  [] No    Donovan HUGHES Rep   07/18/22 13:57 EDT

## 2022-09-06 ENCOUNTER — TELEPHONE (OUTPATIENT)
Dept: GASTROENTEROLOGY | Facility: CLINIC | Age: 67
End: 2022-09-06

## 2022-09-06 NOTE — TELEPHONE ENCOUNTER
----- Message from Antony Tatum sent at 9/4/2022  2:08 PM EDT -----  Regarding: Stomach isues  Susi,  as recommended, I've been taking pantoprazole to help keep my stomach pain under control...2 pills daily, one in the morning and one before  bed have been doing a pretty good job at keeping the pain at bay, that is until lately. Where I was getting a good 12 hours of relief from each pill, they only seem to be lasting about 6 hours now. Can I take 3 pills (4 if necessary) per day, or do we not want to go down that road? Please advise and thank you...Antony Tatum.

## 2022-09-07 NOTE — TELEPHONE ENCOUNTER
2 pills daily is the maximum dose.  Please make sure he is taking this prior to eating so that he can get the maximum effect.  Would also make sure that he is following a GERD diet as this is really important when symptoms are difficult to control.  Could add Carafate temporarily as needed to help with symptoms-please let me know if he wishes to proceed with this

## 2022-09-08 NOTE — TELEPHONE ENCOUNTER
Call to pt.  Advise per DR Nolen note.  Verb understanding.  Declines carafate - states this does not work for him.  States takes pantoprazole mid morning after breakfast/coffee and 2nd dose at bedtime.  Reiterate Dr Nolen's instructions to take prior to eating - ie: before breakfast and dinner.  States will try this.     States uncertain of GERD diet. Home address verified - info mailed.

## 2022-10-26 ENCOUNTER — OFFICE VISIT (OUTPATIENT)
Dept: FAMILY MEDICINE CLINIC | Facility: CLINIC | Age: 67
End: 2022-10-26

## 2022-10-26 VITALS
TEMPERATURE: 97.1 F | HEART RATE: 82 BPM | BODY MASS INDEX: 39.71 KG/M2 | HEIGHT: 68 IN | OXYGEN SATURATION: 94 % | WEIGHT: 262 LBS | DIASTOLIC BLOOD PRESSURE: 80 MMHG | SYSTOLIC BLOOD PRESSURE: 138 MMHG

## 2022-10-26 DIAGNOSIS — E66.09 CLASS 2 OBESITY DUE TO EXCESS CALORIES WITHOUT SERIOUS COMORBIDITY WITH BODY MASS INDEX (BMI) OF 39.0 TO 39.9 IN ADULT: ICD-10-CM

## 2022-10-26 DIAGNOSIS — I25.10 CORONARY ARTERY DISEASE INVOLVING NATIVE HEART WITHOUT ANGINA PECTORIS, UNSPECIFIED VESSEL OR LESION TYPE: Primary | ICD-10-CM

## 2022-10-26 DIAGNOSIS — E78.5 HYPERLIPIDEMIA, UNSPECIFIED HYPERLIPIDEMIA TYPE: ICD-10-CM

## 2022-10-26 DIAGNOSIS — K21.9 GASTROESOPHAGEAL REFLUX DISEASE WITHOUT ESOPHAGITIS: ICD-10-CM

## 2022-10-26 DIAGNOSIS — Z12.5 SCREENING PSA (PROSTATE SPECIFIC ANTIGEN): ICD-10-CM

## 2022-10-26 DIAGNOSIS — R73.9 HYPERGLYCEMIA: ICD-10-CM

## 2022-10-26 DIAGNOSIS — G47.09 OTHER INSOMNIA: ICD-10-CM

## 2022-10-26 DIAGNOSIS — I10 PRIMARY HYPERTENSION: ICD-10-CM

## 2022-10-26 DIAGNOSIS — Z23 IMMUNIZATION DUE: ICD-10-CM

## 2022-10-26 PROCEDURE — 90662 IIV NO PRSV INCREASED AG IM: CPT | Performed by: INTERNAL MEDICINE

## 2022-10-26 PROCEDURE — 99214 OFFICE O/P EST MOD 30 MIN: CPT | Performed by: INTERNAL MEDICINE

## 2022-10-26 PROCEDURE — G0008 ADMIN INFLUENZA VIRUS VAC: HCPCS | Performed by: INTERNAL MEDICINE

## 2022-10-26 RX ORDER — LISINOPRIL 10 MG/1
10 TABLET ORAL DAILY
Qty: 90 TABLET | Refills: 3 | Status: SHIPPED | OUTPATIENT
Start: 2022-10-26

## 2022-10-26 RX ORDER — ATORVASTATIN CALCIUM 40 MG/1
40 TABLET, FILM COATED ORAL DAILY
Qty: 90 TABLET | Refills: 3 | Status: SHIPPED | OUTPATIENT
Start: 2022-10-26

## 2022-10-26 RX ORDER — ZOLPIDEM TARTRATE 10 MG/1
10 TABLET ORAL NIGHTLY PRN
Qty: 30 TABLET | Refills: 4 | Status: SHIPPED | OUTPATIENT
Start: 2022-10-26

## 2022-10-26 NOTE — PROGRESS NOTES
Subjective   Antony Tatum is a 66 y.o. male. Patient is here today for follow-up on his hypertension, hyperlipidemia, hyperglycemia, obesity and coronary artery disease.  Patient's generally been stable and has no acute complaints.  He unfortunately has gained quite a bit of weight.    Chief Complaint   Patient presents with   • follow up to labs     6 months          Vitals:    10/26/22 0813   BP: 138/80   Pulse: 82   Temp: 97.1 °F (36.2 °C)   SpO2: 94%     Body mass index is 39.85 kg/m².  The following portions of the patient's history were reviewed and updated as appropriate: allergies, current medications, past family history, past medical history, past social history, past surgical history and problem list.    Past Medical History:   Diagnosis Date   • Anxiety    • Coronary artery disease    • Depression    • Diverticulosis    • Erosive gastritis    • Family hx of colon cancer    • H/O CT scan of abdomen 01/29/2014    ddd, tics, tortuosity w/aortoiliac atherosclerosis, HH, cardiac enlargement   • Heart attack (HCC)    • Hepatitis     HEP C   • Hiatal hernia    • History of CT scan of abdomen    • Hyperlipidemia    • Hypertension    • Obese    • Thoracic aortic aneurysm without rupture       No Known Allergies   Social History     Socioeconomic History   • Marital status:    Tobacco Use   • Smoking status: Never   • Smokeless tobacco: Never   Substance and Sexual Activity   • Alcohol use: Yes     Alcohol/week: 2.0 standard drinks     Types: 2 Shots of liquor per week     Comment: weekly   • Drug use: No   • Sexual activity: Defer        Current Outpatient Medications:   •  aspirin 81 MG tablet, Take by mouth., Disp: , Rfl:   •  atorvastatin (LIPITOR) 40 MG tablet, Take 1 tablet by mouth Daily., Disp: 90 tablet, Rfl: 3  •  lisinopril (PRINIVIL,ZESTRIL) 10 MG tablet, Take 1 tablet by mouth Daily., Disp: 90 tablet, Rfl: 3  •  pantoprazole (PROTONIX) 40 MG EC tablet, Take 1 tablet by mouth 2 (Two) Times a  Day., Disp: 180 tablet, Rfl: 1  •  sildenafil (Viagra) 100 MG tablet, Take 1 tablet by mouth Daily As Needed for Erectile Dysfunction., Disp: 10 tablet, Rfl: 1  •  zolpidem (AMBIEN) 10 MG tablet, Take 1 tablet by mouth At Night As Needed for Sleep., Disp: 30 tablet, Rfl: 4     Objective     History of Present Illness     Review of Systems    Physical Exam  Vitals and nursing note reviewed.   Constitutional:       General: He is not in acute distress.     Appearance: Normal appearance. He is obese. He is not ill-appearing.   HENT:      Head: Normocephalic and atraumatic.   Cardiovascular:      Rate and Rhythm: Normal rate and regular rhythm.      Heart sounds: Normal heart sounds.   Pulmonary:      Effort: Pulmonary effort is normal. No respiratory distress.      Breath sounds: Normal breath sounds. No wheezing or rales.   Musculoskeletal:         General: Normal range of motion.   Skin:     General: Skin is warm and dry.   Neurological:      General: No focal deficit present.      Mental Status: He is alert and oriented to person, place, and time.   Psychiatric:         Mood and Affect: Mood normal.         Behavior: Behavior normal.         ASSESSMENT CBC is essentially normal.  CMP has a sugar of 101 and alkaline phosphatase is 122 and otherwise was essentially normal and hemoglobin A1c was stable at 5.9.  PSA remains stable and low normal and lipid panel has total cholesterol 157, HDL 54 and LDL 81.  #1-hypertension, borderline  #2-hyperlipidemia, a bit higher than recommended for heart patients  #3-history of coronary artery disease, asymptomatic  #4-obesity with significant weight gain  #5-hyperglycemia with stable acceptable hemoglobin A1c, diet controlled       Problems Addressed this Visit        Cardiac and Vasculature    Hyperlipidemia    Relevant Medications    atorvastatin (LIPITOR) 40 MG tablet    Hypertension    Relevant Medications    lisinopril (PRINIVIL,ZESTRIL) 10 MG tablet    Coronary artery  disease - Primary       Endocrine and Metabolic    Obese    Hyperglycemia       Gastrointestinal Abdominal     Gastroesophageal reflux disease without esophagitis       Genitourinary and Reproductive     Screening PSA (prostate specific antigen)       Sleep    Other insomnia    Relevant Medications    zolpidem (AMBIEN) 10 MG tablet   Diagnoses       Codes Comments    Coronary artery disease involving native heart without angina pectoris, unspecified vessel or lesion type    -  Primary ICD-10-CM: I25.10  ICD-9-CM: 414.01     Hyperlipidemia, unspecified hyperlipidemia type     ICD-10-CM: E78.5  ICD-9-CM: 272.4     Primary hypertension     ICD-10-CM: I10  ICD-9-CM: 401.9     Hyperglycemia     ICD-10-CM: R73.9  ICD-9-CM: 790.29     Class 2 obesity due to excess calories without serious comorbidity with body mass index (BMI) of 39.0 to 39.9 in adult     ICD-10-CM: E66.09, Z68.39  ICD-9-CM: 278.00, V85.39     Gastroesophageal reflux disease without esophagitis     ICD-10-CM: K21.9  ICD-9-CM: 530.81     Screening PSA (prostate specific antigen)     ICD-10-CM: Z12.5  ICD-9-CM: V76.44     Other insomnia     ICD-10-CM: G47.09  ICD-9-CM: 780.52           PLAN I am going to increase the patient's lisinopril to 10 mg daily and increase his atorvastatin to 40 mg daily.  He will continue other medicines as now and I would like to recheck him in 6 months with laboratory studies    There are no Patient Instructions on file for this visit.  Return in about 6 months (around 4/26/2023) for with labs.

## 2022-12-12 ENCOUNTER — OFFICE VISIT (OUTPATIENT)
Dept: FAMILY MEDICINE CLINIC | Facility: CLINIC | Age: 67
End: 2022-12-12

## 2022-12-12 VITALS
HEART RATE: 79 BPM | BODY MASS INDEX: 39.85 KG/M2 | TEMPERATURE: 98.5 F | SYSTOLIC BLOOD PRESSURE: 124 MMHG | OXYGEN SATURATION: 98 % | DIASTOLIC BLOOD PRESSURE: 74 MMHG | HEIGHT: 68 IN

## 2022-12-12 DIAGNOSIS — I10 PRIMARY HYPERTENSION: Primary | ICD-10-CM

## 2022-12-12 DIAGNOSIS — B96.89 ACUTE BACTERIAL BRONCHITIS: ICD-10-CM

## 2022-12-12 DIAGNOSIS — J20.8 ACUTE BACTERIAL BRONCHITIS: ICD-10-CM

## 2022-12-12 PROCEDURE — 99213 OFFICE O/P EST LOW 20 MIN: CPT | Performed by: INTERNAL MEDICINE

## 2022-12-12 RX ORDER — AMOXICILLIN AND CLAVULANATE POTASSIUM 875; 125 MG/1; MG/1
1 TABLET, FILM COATED ORAL 2 TIMES DAILY
Qty: 14 TABLET | Refills: 0 | Status: SHIPPED | OUTPATIENT
Start: 2022-12-12 | End: 2023-01-30

## 2022-12-12 RX ORDER — HYDROCODONE BITARTRATE AND HOMATROPINE METHYLBROMIDE ORAL SOLUTION 5; 1.5 MG/5ML; MG/5ML
5 LIQUID ORAL EVERY 6 HOURS PRN
Qty: 180 ML | Refills: 0 | Status: SHIPPED | OUTPATIENT
Start: 2022-12-12 | End: 2023-01-30

## 2022-12-12 NOTE — PROGRESS NOTES
Subjective   Antony Tatum is a 67 y.o. male. Patient is here today for   Chief Complaint   Patient presents with   • Headache   • URI   • Cough     Sx started a wk ago           Vitals:    12/12/22 0936   BP: 124/74   Pulse: 79   Temp: 98.5 °F (36.9 °C)   SpO2: 98%     Body mass index is 39.85 kg/m².      Past Medical History:   Diagnosis Date   • Anxiety    • Coronary artery disease    • Depression    • Diverticulosis    • Erosive gastritis    • Family hx of colon cancer    • H/O CT scan of abdomen 01/29/2014    ddd, tics, tortuosity w/aortoiliac atherosclerosis, HH, cardiac enlargement   • Heart attack (HCC)    • Hepatitis     HEP C   • Hiatal hernia    • History of CT scan of abdomen    • Hyperlipidemia    • Hypertension    • Obese    • Thoracic aortic aneurysm without rupture       No Known Allergies   Social History     Socioeconomic History   • Marital status:    Tobacco Use   • Smoking status: Never   • Smokeless tobacco: Never   Substance and Sexual Activity   • Alcohol use: Yes     Alcohol/week: 2.0 standard drinks     Types: 2 Shots of liquor per week     Comment: weekly   • Drug use: No   • Sexual activity: Defer        Current Outpatient Medications:   •  aspirin 81 MG tablet, Take by mouth., Disp: , Rfl:   •  atorvastatin (LIPITOR) 40 MG tablet, Take 1 tablet by mouth Daily., Disp: 90 tablet, Rfl: 3  •  lisinopril (PRINIVIL,ZESTRIL) 10 MG tablet, Take 1 tablet by mouth Daily., Disp: 90 tablet, Rfl: 3  •  pantoprazole (PROTONIX) 40 MG EC tablet, Take 1 tablet by mouth 2 (Two) Times a Day., Disp: 180 tablet, Rfl: 1  •  sildenafil (Viagra) 100 MG tablet, Take 1 tablet by mouth Daily As Needed for Erectile Dysfunction., Disp: 10 tablet, Rfl: 1  •  zolpidem (AMBIEN) 10 MG tablet, Take 1 tablet by mouth At Night As Needed for Sleep., Disp: 30 tablet, Rfl: 4  •  amoxicillin-clavulanate (Augmentin) 875-125 MG per tablet, Take 1 tablet by mouth 2 (Two) Times a Day., Disp: 14 tablet, Rfl: 0  •   HYDROcodone Bit-Homatrop MBr (HYCODAN) 5-1.5 MG/5ML solution, Take 5 mL by mouth Every 6 (Six) Hours As Needed for Cough., Disp: 180 mL, Rfl: 0     Objective     History of Present Illness  This patient has been sick with a productive cough for about a week.  He tested negative for COVID and flu at home.    He denies dyspnea.  He notes cough which is occasionally productive.    Earlier on he had fevers and chills but he no longer has this.    He notes significant sinus pressure and postnasal drainage.       Review of Systems   Constitutional: Negative.    HENT: Positive for postnasal drip and sinus pressure.    Respiratory: Positive for cough. Negative for choking and shortness of breath.    Cardiovascular: Negative.    Musculoskeletal: Negative.    Psychiatric/Behavioral: Negative.        Physical Exam  Vitals and nursing note reviewed.   Constitutional:       General: He is not in acute distress.     Appearance: Normal appearance. He is obese. He is not ill-appearing, toxic-appearing or diaphoretic.      Comments: Pleasant, neatly groomed, in no distress.    Weight 262 pounds.   Cardiovascular:      Rate and Rhythm: Regular rhythm.      Heart sounds: Normal heart sounds. No murmur heard.    No gallop.   Pulmonary:      Effort: No respiratory distress.      Breath sounds: Normal breath sounds. No wheezing or rales.   Neurological:      Mental Status: He is alert and oriented to person, place, and time.   Psychiatric:         Mood and Affect: Mood normal.         Behavior: Behavior normal.         Thought Content: Thought content normal.           Problems Addressed this Visit        Cardiac and Vasculature    Hypertension - Primary       Pulmonary and Pneumonias    Acute bacterial bronchitis    Relevant Medications    HYDROcodone Bit-Homatrop MBr (HYCODAN) 5-1.5 MG/5ML solution    amoxicillin-clavulanate (Augmentin) 875-125 MG per tablet   Diagnoses       Codes Comments    Primary hypertension    -  Primary  ICD-10-CM: I10  ICD-9-CM: 401.9     Acute bacterial bronchitis     ICD-10-CM: J20.8, B96.89  ICD-9-CM: 466.0, 041.9             PLAN  He tested negative for flu a and B and COVID in our office today.    He requested a prescription for hydrocodone cough syrup which I provided for him.    I sent out a prescription for Augmentin 875 1 p.o. twice daily.    I asked him to let me know if he should take a turn for the worse or fail to improve.  No follow-ups on file.

## 2022-12-14 RX ORDER — PANTOPRAZOLE SODIUM 40 MG/1
TABLET, DELAYED RELEASE ORAL
Qty: 180 TABLET | Refills: 0 | Status: SHIPPED | OUTPATIENT
Start: 2022-12-14 | End: 2023-04-04

## 2022-12-19 NOTE — TELEPHONE ENCOUNTER
Message sent to pt thru mychart advising pt that his medicaion had been refilled and he is due for a f/u.

## 2023-01-03 DIAGNOSIS — R05.2 SUBACUTE COUGH: Primary | ICD-10-CM

## 2023-01-10 ENCOUNTER — HOSPITAL ENCOUNTER (OUTPATIENT)
Dept: GENERAL RADIOLOGY | Facility: HOSPITAL | Age: 68
Discharge: HOME | End: 2023-01-10
Admitting: INTERNAL MEDICINE
Payer: MEDICARE

## 2023-01-10 DIAGNOSIS — R05.2 SUBACUTE COUGH: ICD-10-CM

## 2023-01-10 PROCEDURE — 71046 X-RAY EXAM CHEST 2 VIEWS: CPT

## 2023-01-25 DIAGNOSIS — R91.1 PULMONARY NODULE: Primary | ICD-10-CM

## 2023-01-30 ENCOUNTER — OFFICE VISIT (OUTPATIENT)
Dept: GASTROENTEROLOGY | Facility: CLINIC | Age: 68
End: 2023-01-30
Payer: MEDICARE

## 2023-01-30 VITALS
TEMPERATURE: 96.3 F | HEIGHT: 68 IN | WEIGHT: 256.4 LBS | OXYGEN SATURATION: 92 % | SYSTOLIC BLOOD PRESSURE: 157 MMHG | HEART RATE: 56 BPM | DIASTOLIC BLOOD PRESSURE: 81 MMHG | BODY MASS INDEX: 38.86 KG/M2

## 2023-01-30 DIAGNOSIS — K21.9 GASTROESOPHAGEAL REFLUX DISEASE WITHOUT ESOPHAGITIS: Primary | ICD-10-CM

## 2023-01-30 DIAGNOSIS — K57.90 DIVERTICULOSIS: ICD-10-CM

## 2023-01-30 DIAGNOSIS — R10.13 EPIGASTRIC PAIN: ICD-10-CM

## 2023-01-30 PROCEDURE — 99214 OFFICE O/P EST MOD 30 MIN: CPT | Performed by: INTERNAL MEDICINE

## 2023-01-30 RX ORDER — METOCLOPRAMIDE 10 MG/1
10 TABLET ORAL
Qty: 25 TABLET | Refills: 0 | Status: SHIPPED | OUTPATIENT
Start: 2023-01-30

## 2023-01-30 NOTE — PROGRESS NOTES
Subjective   Chief Complaint   Patient presents with   • Med Refill   • Abdominal Pain       Antony Tatum is a  67 y.o. male here for a follow up visit for abdominal pain, GERD.  He was last seen in January 2022.    Previous work-up for abdominal pain includes EGD and colonoscopy performed by Dr. Watkins that were unremarkable.  He had an MRCP that was unremarkable.  H. pylori CLOtest was negative.  He had a CT of the abdomen and pelvis that failed to reveal the etiology of his symptoms.  GES 2/2022 with 4% remaining at 4 hours    He continues to have episodic abdominal pain as well as heartburn symptoms.  It often wakes him from sleep.  He will take Pepcid.  His weight is stable.  He has made adjustments to his diet and has cut out triggers that seem to make it worse including citrus, dairy, beef.  He was still indulge sometimes however knows that he will be symptomatic afterwards.  He takes pantoprazole twice daily.  Bowel movements are normal.  He denies constipation.  He does have chronic back pain and occasionally takes an old Lortab which gives him relief but he does not use this regularly.  HPI  Past Medical History:   Diagnosis Date   • Anxiety    • Coronary artery disease    • Depression    • Diverticulosis    • Erosive gastritis    • Family hx of colon cancer    • H/O CT scan of abdomen 01/29/2014    ddd, tics, tortuosity w/aortoiliac atherosclerosis, HH, cardiac enlargement   • Heart attack (HCC)    • Hepatitis     HEP C   • Hiatal hernia    • History of CT scan of abdomen    • Hyperlipidemia    • Hypertension    • Obese    • Thoracic aortic aneurysm without rupture      Past Surgical History:   Procedure Laterality Date   • CERVICAL DISCECTOMY ANTERIOR      anterior spinal, osteophytectomy cerv interspace   • CERVICAL DISCECTOMY ANTERIOR  12/07/2015    spinal; C5-C6 and C6-C7 anterior cervical discectomy and arthrodesis; Jon Lua   • CHOLECYSTECTOMY     • COLONOSCOPY  06/10/2016    normal  Hallie MATHIS   • CORONARY ANGIOPLASTY WITH STENT PLACEMENT     • CORONARY ANGIOPLASTY WITH STENT PLACEMENT N/A     10yrs ago with Dr Ruff at [facility]; debatable at whether he had an MI that admission   • ENDOSCOPY N/A 12/28/2016    Medium sized HH, gastritis.     • GALLBLADDER SURGERY     • UPPER GASTROINTESTINAL ENDOSCOPY  02/12/2014    LA Grade A reflux esophagitis, HH, erosive gtastritis, bx       Current Outpatient Medications:   •  aspirin 81 MG tablet, Take by mouth., Disp: , Rfl:   •  atorvastatin (LIPITOR) 40 MG tablet, Take 1 tablet by mouth Daily., Disp: 90 tablet, Rfl: 3  •  lisinopril (PRINIVIL,ZESTRIL) 10 MG tablet, Take 1 tablet by mouth Daily., Disp: 90 tablet, Rfl: 3  •  pantoprazole (PROTONIX) 40 MG EC tablet, TAKE 1 TABLET BY MOUTH TWICE A DAY, Disp: 180 tablet, Rfl: 0  •  sildenafil (Viagra) 100 MG tablet, Take 1 tablet by mouth Daily As Needed for Erectile Dysfunction., Disp: 10 tablet, Rfl: 1  •  zolpidem (AMBIEN) 10 MG tablet, Take 1 tablet by mouth At Night As Needed for Sleep., Disp: 30 tablet, Rfl: 4  •  metoclopramide (REGLAN) 10 MG tablet, Take 1 tablet by mouth 4 (Four) Times a Day Before Meals & at Bedtime As Needed (epigastric pain, heartburn)., Disp: 25 tablet, Rfl: 0  PRN Meds:.  No Known Allergies  Social History     Socioeconomic History   • Marital status:    Tobacco Use   • Smoking status: Never   • Smokeless tobacco: Never   Substance and Sexual Activity   • Alcohol use: Yes     Alcohol/week: 2.0 standard drinks     Types: 2 Shots of liquor per week     Comment: weekly   • Drug use: No   • Sexual activity: Defer     Family History   Problem Relation Age of Onset   • Colon cancer Mother    • Diabetes Mother    • Hypertension Mother    • Breast cancer Sister      Review of Systems   Constitutional: Negative for appetite change and unexpected weight change.   Gastrointestinal: Positive for abdominal pain. Negative for constipation, nausea and vomiting.     Vitals:     01/30/23 0933   BP: 157/81   Pulse: 56   Temp: 96.3 °F (35.7 °C)   SpO2: 92%         01/30/23 0933   Weight: 116 kg (256 lb 6.4 oz)       Objective   Physical Exam  Constitutional:       Appearance: Normal appearance. He is well-developed.   HENT:      Head: Normocephalic and atraumatic.   Eyes:      General: No scleral icterus.     Conjunctiva/sclera: Conjunctivae normal.   Pulmonary:      Effort: Pulmonary effort is normal.   Abdominal:      General: There is no distension.      Palpations: Abdomen is soft.   Musculoskeletal:      Cervical back: Normal range of motion and neck supple.   Skin:     General: Skin is warm and dry.   Neurological:      Mental Status: He is alert.   Psychiatric:         Mood and Affect: Mood normal.         Behavior: Behavior normal.       No radiology results for the last 7 days    Assessment & Plan   Diagnoses and all orders for this visit:    Gastroesophageal reflux disease without esophagitis    Epigastric pain    Diverticulosis    Other orders  -     metoclopramide (REGLAN) 10 MG tablet; Take 1 tablet by mouth 4 (Four) Times a Day Before Meals & at Bedtime As Needed (epigastric pain, heartburn).      Plan:  · Start fiber supplementation daily  · Continue pantoprazole twice daily-long-term risk of PPIs discussed with patient.  Recent creatinine reviewed and is normal.  · Continue diet lifestyle modification to reduce symptoms  · GES was normal but his symptoms are certainly suggestive of delayed gastric emptying-recommend trial of as needed Reglan to see if this will give him any relief when he is highly symptomatic.  We discussed the risks of Reglan which I do not think will be an issue given very limited expected use.

## 2023-02-01 RX ORDER — SILDENAFIL 100 MG/1
TABLET, FILM COATED ORAL
Qty: 10 TABLET | Refills: 0 | Status: SHIPPED | OUTPATIENT
Start: 2023-02-01

## 2023-02-03 ENCOUNTER — TELEPHONE (OUTPATIENT)
Dept: GASTROENTEROLOGY | Facility: CLINIC | Age: 68
End: 2023-02-03
Payer: MEDICARE

## 2023-02-03 NOTE — TELEPHONE ENCOUNTER
I understand - the listed side effects are scary but they are very uncommon - if seen, usually in people who take this regularly.  It can be taken 4X/d but for you, we had discussed only when needed.  I don't have any other great ideas at this point

## 2023-02-03 NOTE — TELEPHONE ENCOUNTER
----- Message from Antony Tatum sent at 2/3/2023  1:56 PM EST -----  Regarding: Metoclopramide  Contact: [phone]  Dr. Nolen, I just picked up my prescription of the above medicine you prescribed for me. However, after reading about the possible side effects...tardive dystonia, parkinsonism, akathisia, neuroleptic malignant syndrome, depression,  possible tongue swelling, trouble swallowing, trouble breathing, rash, hives, mouth sores, thoughts about suicide, etc., I think I'll pass.  Plus, the instructions are to take 4 times a day? I don't think that's what we talked about. Anyway, if you've got something else that might help with my stomach pain that won't kill me, I'm all in...but not this stuff. Thanks, Bandar

## 2023-02-06 ENCOUNTER — TELEPHONE (OUTPATIENT)
Dept: GASTROENTEROLOGY | Facility: CLINIC | Age: 68
End: 2023-02-06
Payer: MEDICARE

## 2023-02-06 NOTE — TELEPHONE ENCOUNTER
----- Message from Antony Tatum sent at 2/3/2023  1:56 PM EST -----  Regarding: Metoclopramide  Contact: [phone]  Dr. Nolen, I just picked up my prescription of the above medicine you prescribed for me. However, after reading about the possible side effects...tardive dystonia, parkinsonism, akathisia, neuroleptic malignant syndrome, depression,  possible tongue swelling, trouble swallowing, trouble breathing, rash, hives, mouth sores, thoughts about suicide, etc., I think I'll pass.  Plus, the instructions are to take 4 times a day? I don't think that's what we talked about. Anyway, if you've got something else that might help with my stomach pain that won't kill me, I'm all in...but not this stuff. Thanks, Bandar

## 2023-02-06 NOTE — TELEPHONE ENCOUNTER
"Call to pt.  Advise per DR Nolen note.  Verb understanding.     States \"I might give it a shot - or may not, I'll think about it\".   "

## 2023-03-20 ENCOUNTER — HOSPITAL ENCOUNTER (OUTPATIENT)
Dept: CT IMAGING | Facility: HOSPITAL | Age: 68
Discharge: HOME | End: 2023-03-20
Admitting: INTERNAL MEDICINE
Payer: MEDICARE

## 2023-03-20 ENCOUNTER — OFFICE VISIT (OUTPATIENT)
Dept: FAMILY MEDICINE CLINIC | Facility: CLINIC | Age: 68
End: 2023-03-20
Payer: MEDICARE

## 2023-03-20 VITALS
OXYGEN SATURATION: 95 % | RESPIRATION RATE: 17 BRPM | BODY MASS INDEX: 38.99 KG/M2 | HEIGHT: 68 IN | DIASTOLIC BLOOD PRESSURE: 78 MMHG | HEART RATE: 63 BPM | SYSTOLIC BLOOD PRESSURE: 118 MMHG | TEMPERATURE: 96 F

## 2023-03-20 DIAGNOSIS — R05.9 COUGH, UNSPECIFIED TYPE: Primary | ICD-10-CM

## 2023-03-20 DIAGNOSIS — I71.21 ANEURYSM OF ASCENDING AORTA WITHOUT RUPTURE: ICD-10-CM

## 2023-03-20 DIAGNOSIS — R91.1 PULMONARY NODULE: ICD-10-CM

## 2023-03-20 PROBLEM — I71.20 THORACIC AORTIC ANEURYSM WITHOUT RUPTURE (HCC): Status: RESOLVED | Noted: 2018-06-20 | Resolved: 2023-03-20

## 2023-03-20 LAB
CREAT BLDA-MCNC: 0.9 MG/DL (ref 0.6–1.3)
EXPIRATION DATE: NORMAL
FLUAV AG UPPER RESP QL IA.RAPID: NOT DETECTED
FLUBV AG UPPER RESP QL IA.RAPID: NOT DETECTED
INTERNAL CONTROL: NORMAL
Lab: NORMAL
SARS-COV-2 AG UPPER RESP QL IA.RAPID: NOT DETECTED

## 2023-03-20 PROCEDURE — 71260 CT THORAX DX C+: CPT

## 2023-03-20 PROCEDURE — 25510000001 IOPAMIDOL 61 % SOLUTION: Performed by: INTERNAL MEDICINE

## 2023-03-20 PROCEDURE — 82565 ASSAY OF CREATININE: CPT

## 2023-03-20 RX ADMIN — IOPAMIDOL 75 ML: 612 INJECTION, SOLUTION INTRAVENOUS at 07:25

## 2023-03-20 NOTE — PROGRESS NOTES
Ja Teran MD  Internal Medicine  [phone] (office)             03/20/2023    Patient Information  Antony Tatum                                                                                          [id number] [id number]  1955        Chief Complaint   Patient presents with   • Cough     Sxs for one week   • URI          History of Present Illness:    Antony Tatum is a 67 y.o. male who presents to the office for evaluation of cough for approximately one week. Patient feels well otherwise and denies fever, chills, night sweats, postnasal drip, sinus congestion, dyspnea. He reports taking lisinopril for years now.     Patient is a nonsmoker. He has pulmonary nodules that are being monitored and underwent chest CT today. He also has history of CAD and TAA. He denies knowing about TAA though. Patient is not taking beta blocker, but thinks he was previously prescribed this by cardiologist.         Past Medical History:   Diagnosis Date   • Anxiety    • Coronary artery disease    • Depression    • Diverticulosis    • Erosive gastritis    • Family hx of colon cancer    • H/O CT scan of abdomen 01/29/2014    ddd, tics, tortuosity w/aortoiliac atherosclerosis, HH, cardiac enlargement   • Heart attack (HCC)    • Hepatitis     HEP C   • Hiatal hernia    • History of CT scan of abdomen    • Hyperlipidemia    • Hypertension    • Obese    • Thoracic aortic aneurysm without rupture          Past Surgical History:   Procedure Laterality Date   • CERVICAL DISCECTOMY ANTERIOR      anterior spinal, osteophytectomy cerv interspace   • CERVICAL DISCECTOMY ANTERIOR  12/07/2015    spinal; C5-C6 and C6-C7 anterior cervical discectomy and arthrodesis; Jon Lua   • CHOLECYSTECTOMY     • COLONOSCOPY  06/10/2016    himanshu Sterling M.D.   • CORONARY ANGIOPLASTY WITH STENT PLACEMENT     • CORONARY ANGIOPLASTY WITH STENT PLACEMENT N/A     10yrs ago with Dr Ruff at [facility]; debatable at  "whether he had an MI that admission   • ENDOSCOPY N/A 12/28/2016    Medium sized HH, gastritis.     • GALLBLADDER SURGERY     • UPPER GASTROINTESTINAL ENDOSCOPY  02/12/2014    LA Grade A reflux esophagitis, HH, erosive gtastritis, bx         No Known Allergies        Current Outpatient Medications:   •  aspirin 81 MG tablet, Take by mouth., Disp: , Rfl:   •  atorvastatin (LIPITOR) 40 MG tablet, Take 1 tablet by mouth Daily., Disp: 90 tablet, Rfl: 3  •  lisinopril (PRINIVIL,ZESTRIL) 10 MG tablet, Take 1 tablet by mouth Daily., Disp: 90 tablet, Rfl: 3  •  metoclopramide (REGLAN) 10 MG tablet, Take 1 tablet by mouth 4 (Four) Times a Day Before Meals & at Bedtime As Needed (epigastric pain, heartburn)., Disp: 25 tablet, Rfl: 0  •  pantoprazole (PROTONIX) 40 MG EC tablet, TAKE 1 TABLET BY MOUTH TWICE A DAY, Disp: 180 tablet, Rfl: 0  •  sildenafil (VIAGRA) 100 MG tablet, TAKE 1 TABLET BY MOUTH ONCE DAILY AS NEEDED FOR ERECTILE DYSFUNCTION, Disp: 10 tablet, Rfl: 0  •  zolpidem (AMBIEN) 10 MG tablet, Take 1 tablet by mouth At Night As Needed for Sleep., Disp: 30 tablet, Rfl: 4  No current facility-administered medications for this visit.      Family History   Problem Relation Age of Onset   • Colon cancer Mother    • Diabetes Mother    • Hypertension Mother    • Breast cancer Sister          Social History     Socioeconomic History   • Marital status:    Tobacco Use   • Smoking status: Never   • Smokeless tobacco: Never   Substance and Sexual Activity   • Alcohol use: Yes     Alcohol/week: 2.0 standard drinks     Types: 2 Shots of liquor per week     Comment: weekly   • Drug use: No   • Sexual activity: Defer         Vitals:    03/20/23 1004   BP: 118/78   BP Location: Left arm   Patient Position: Sitting   Cuff Size: Adult   Pulse: 63   Resp: 17   Temp: 96 °F (35.6 °C)   TempSrc: Temporal   SpO2: 95%   Height: 172.7 cm (67.99\")        Body mass index is 38.99 kg/m².      Physical Exam  Constitutional:       General: " He is not in acute distress.     Appearance: Normal appearance. He is obese. He is not ill-appearing.   HENT:      Head: Normocephalic.   Cardiovascular:      Rate and Rhythm: Normal rate and regular rhythm.      Heart sounds: Murmur heard.      Comments: III/VI systolic murmur heard best at RUSB.   Pulmonary:      Effort: Pulmonary effort is normal. No respiratory distress.      Breath sounds: Normal breath sounds. No stridor. No wheezing, rhonchi or rales.   Neurological:      Mental Status: He is alert and oriented to person, place, and time.   Psychiatric:         Mood and Affect: Mood normal.         Behavior: Behavior normal.            Lab/other results:      Assessment/Plan:    Diagnoses and all orders for this visit:    1. Cough, unspecified type (Primary)  Comments:  Patient afebrile, stable, well appearing, and has normal lung examination and negative rapid COVID19 test. Monitor for now, consider D/C lisinopril if persists.  Orders:  -     POCT SARS-CoV-2 Antigen INGE + Flu    2. Aneurysm of ascending aorta without rupture  Assessment & Plan:  Ascending aorta aneurysm to 4.7cm on last CT (9/2019). Patient unaware of this. He had repeat CT with contrast today which will be helpful in monitoring disease. I advised him that a beta blocker may be more beneficial to controlling this (vs. ACE-I). He wishes to discuss further with cardiologist before switching medication. He declined handout about TAA.              Procedures

## 2023-03-20 NOTE — ASSESSMENT & PLAN NOTE
Ascending aorta aneurysm to 4.7cm on last CT (9/2019). Patient unaware of this. He had repeat CT with contrast today which will be helpful in monitoring disease. I advised him that a beta blocker may be more beneficial to controlling this (vs. ACE-I). He wishes to discuss further with cardiologist before switching medication. He declined handout about TAA.

## 2023-03-22 ENCOUNTER — TELEPHONE (OUTPATIENT)
Dept: FAMILY MEDICINE CLINIC | Facility: CLINIC | Age: 68
End: 2023-03-22
Payer: MEDICARE

## 2023-03-22 NOTE — TELEPHONE ENCOUNTER
----- Message from Sonia Monae MA sent at 3/22/2023  8:01 AM EDT -----  Regarding: FW: visited Monday  Contact: [phone]        ----- Message -----  From: Sonia Monae MA  Sent: 3/22/2023   6:37 AM EDT  To: Sonia Monae MA  Subject: FW: visited Monday                                 ----- Message -----  From: Antony Tatum  Sent: 3/21/2023   9:52 PM EDT  To: [facility] Main Clinical Pool  Subject: visited Monday                                   Dr. Teran, I came in Monday, was experiencing chest congestion. Seems to have gotten worse. moved into my head...constant headaches, runny nose and still have the congestion.  I'm having a very hard time coughing up the phlegm.  Still taking the mucinex and guerrero seltzer cold, but not doing much good.  I need to get in front of this thing. Is there anything you can prescribe, like a Z pack or something?

## 2023-04-04 ENCOUNTER — TELEPHONE (OUTPATIENT)
Dept: GASTROENTEROLOGY | Facility: CLINIC | Age: 68
End: 2023-04-04
Payer: MEDICARE

## 2023-04-04 RX ORDER — PANTOPRAZOLE SODIUM 40 MG/1
TABLET, DELAYED RELEASE ORAL
Qty: 180 TABLET | Refills: 0 | Status: SHIPPED | OUTPATIENT
Start: 2023-04-04

## 2023-04-04 NOTE — TELEPHONE ENCOUNTER
My chart message to pt;  Dr Nolen is out of the office this week,  I have forward your message to her and we will reach out once we have a response.     You can also check with your PCP in the meantime while we wait on Dr Nolen's response.  Thanks  Nancy LINDO

## 2023-04-04 NOTE — TELEPHONE ENCOUNTER
----- Message from Antony Tatum sent at 4/1/2023  9:26 PM EDT -----  Regarding: coughing  Contact: [phone]  Dr. Nolen, I got back from [place] a couple of weeks ago and apparently caught a virus while I was up there. I feel like I've got phlegm on my lungs and I've been coughing almost non stop trying to clear it. I went to my family doctor (saw his asst. Dr. Teran) and he diagnosed it as a viral infection.  My wife wanted me to reach out to you because it seems worse after I eat, the coughing I mean. I'm still taking the pantaprozole twice a day. Could this be GERD related? Do you have any ideas? I'm taking mucinex and cough suppressants, but nothing seems to help. Any light you could shed on this to get me some relief would be greatly appreciated.

## 2023-04-06 RX ORDER — HYDROCODONE BITARTRATE AND HOMATROPINE METHYLBROMIDE ORAL SOLUTION 5; 1.5 MG/5ML; MG/5ML
5 LIQUID ORAL EVERY 6 HOURS PRN
Qty: 180 ML | Refills: 0 | Status: SHIPPED | OUTPATIENT
Start: 2023-04-06 | End: 2023-04-10 | Stop reason: SDUPTHER

## 2023-04-06 NOTE — TELEPHONE ENCOUNTER
Pt saw Dr. Teran on 3/20/2023 for cough. Pt saw you on 12/12/2022 for cough as well. Pt is asking to have this cough medication sent in. Are you willing to send in medication for pt?

## 2023-04-10 DIAGNOSIS — R05.9 COUGH, UNSPECIFIED TYPE: Primary | ICD-10-CM

## 2023-04-10 RX ORDER — HYDROCODONE BITARTRATE AND HOMATROPINE METHYLBROMIDE ORAL SOLUTION 5; 1.5 MG/5ML; MG/5ML
5 LIQUID ORAL EVERY 6 HOURS PRN
Qty: 180 ML | Refills: 0 | Status: SHIPPED | OUTPATIENT
Start: 2023-04-10 | End: 2023-04-18 | Stop reason: SDUPTHER

## 2023-04-10 NOTE — TELEPHONE ENCOUNTER
What you are describing is a bronchospastic cough that is often in the remnant of an infection.  It is easily triggered and so eating or anything that touches or irritates the area can trigger the cough.  It should get better with time.  Unfortunately changing acid suppression medications is unlikely to provide any significant relief.  Would use cough suppressants, Mucinex and lozenges to soothe the area and prevent dryness which can also be irritating

## 2023-04-12 ENCOUNTER — TELEPHONE (OUTPATIENT)
Dept: GASTROENTEROLOGY | Facility: CLINIC | Age: 68
End: 2023-04-12
Payer: MEDICARE

## 2023-04-12 NOTE — TELEPHONE ENCOUNTER
VM to pt with request to contact office - or update if ok to leave VM.     Ok for HUB to read Dr Nolen's message.

## 2023-04-12 NOTE — TELEPHONE ENCOUNTER
"----- Message from Antony Tatum sent at 4/11/2023  6:10 PM EDT -----  Regarding: stomach  Contact: [phone]  Dr. Nolen,  My wife bought some probiotics + prebiotics to help me regain some gut health.  Brand name \"Fortify\", it has 10 probiotic strains in it...30 pills. Will it hurt me to take these? If not, I would like to take them for the 30 days recommended.   "

## 2023-04-18 DIAGNOSIS — R05.9 COUGH, UNSPECIFIED TYPE: ICD-10-CM

## 2023-04-18 RX ORDER — HYDROCODONE BITARTRATE AND HOMATROPINE METHYLBROMIDE ORAL SOLUTION 5; 1.5 MG/5ML; MG/5ML
5 LIQUID ORAL EVERY 6 HOURS PRN
Qty: 180 ML | Refills: 0 | Status: SHIPPED | OUTPATIENT
Start: 2023-04-18

## 2023-04-18 RX ORDER — FLUTICASONE PROPIONATE 50 MCG
SPRAY, SUSPENSION (ML) NASAL
Qty: 16 ML | Refills: 0 | Status: SHIPPED | OUTPATIENT
Start: 2023-04-18

## 2023-04-18 NOTE — TELEPHONE ENCOUNTER
Rx Refill Note  Requested Prescriptions     Pending Prescriptions Disp Refills   • fluticasone (FLONASE) 50 MCG/ACT nasal spray [Pharmacy Med Name: FLUTICASONE PROP 50 MCG SPRAY] 16 mL      Sig: SPRAY 2 SPRAYS INTO THE NOSTRIL AS DIRECTED BY PROVIDER DAILY.      Last office visit with prescribing clinician: 3/20/2023   Last telemedicine visit with prescribing clinician: 5/3/2023   Next office visit with prescribing clinician: Visit date not found                         Would you like a call back once the refill request has been completed: [] Yes [] No    If the office needs to give you a call back, can they leave a voicemail: [] Yes [] No    Sonia Monae MA  04/18/23, 06:39 EDT

## 2023-05-10 ENCOUNTER — OFFICE VISIT (OUTPATIENT)
Dept: FAMILY MEDICINE CLINIC | Facility: CLINIC | Age: 68
End: 2023-05-10
Payer: MEDICARE

## 2023-05-10 VITALS
HEIGHT: 68 IN | OXYGEN SATURATION: 96 % | TEMPERATURE: 95 F | BODY MASS INDEX: 37.89 KG/M2 | SYSTOLIC BLOOD PRESSURE: 122 MMHG | HEART RATE: 56 BPM | DIASTOLIC BLOOD PRESSURE: 78 MMHG | WEIGHT: 250 LBS | RESPIRATION RATE: 16 BRPM

## 2023-05-10 DIAGNOSIS — R91.1 PULMONARY NODULE: ICD-10-CM

## 2023-05-10 DIAGNOSIS — I10 PRIMARY HYPERTENSION: Primary | ICD-10-CM

## 2023-05-10 DIAGNOSIS — I25.10 CORONARY ARTERY DISEASE INVOLVING NATIVE HEART WITHOUT ANGINA PECTORIS, UNSPECIFIED VESSEL OR LESION TYPE: ICD-10-CM

## 2023-05-10 DIAGNOSIS — R73.9 HYPERGLYCEMIA: ICD-10-CM

## 2023-05-10 DIAGNOSIS — E78.5 HYPERLIPIDEMIA, UNSPECIFIED HYPERLIPIDEMIA TYPE: ICD-10-CM

## 2023-05-10 DIAGNOSIS — G47.09 OTHER INSOMNIA: ICD-10-CM

## 2023-05-10 DIAGNOSIS — E66.09 CLASS 2 OBESITY DUE TO EXCESS CALORIES WITHOUT SERIOUS COMORBIDITY WITH BODY MASS INDEX (BMI) OF 38.0 TO 38.9 IN ADULT: ICD-10-CM

## 2023-05-10 NOTE — PROGRESS NOTES
Subjective   Antony Tatum is a 67 y.o. male. Patient is here today for follow-up on his hypertension, hyperlipidemia, obesity, hyperglycemia and coronary artery disease.  He is generally stable.  He had a CT scan of the chest in March which showed a pulmonary nodule that was a bit bigger than prior and should have a repeat CT in late June or July.  Chief Complaint   Patient presents with   • Hypertension          Vitals:    05/10/23 0758   BP: 122/78   Pulse: 56   Resp: 16   Temp: 95 °F (35 °C)   SpO2: 96%     Body mass index is 38.02 kg/m².  The following portions of the patient's history were reviewed and updated as appropriate: allergies, current medications, past family history, past medical history, past social history, past surgical history and problem list.    Past Medical History:   Diagnosis Date   • Anxiety    • Coronary artery disease    • Depression    • Diverticulosis    • Erosive gastritis    • Family hx of colon cancer    • H/O CT scan of abdomen 01/29/2014    ddd, tics, tortuosity w/aortoiliac atherosclerosis, HH, cardiac enlargement   • Heart attack    • Hepatitis     HEP C   • Hiatal hernia    • History of CT scan of abdomen    • Hyperlipidemia    • Hypertension    • Obese    • Thoracic aortic aneurysm without rupture       No Known Allergies   Social History     Socioeconomic History   • Marital status:    Tobacco Use   • Smoking status: Never   • Smokeless tobacco: Never   Substance and Sexual Activity   • Alcohol use: Yes     Alcohol/week: 2.0 standard drinks     Types: 2 Shots of liquor per week     Comment: weekly   • Drug use: No   • Sexual activity: Defer        Current Outpatient Medications:   •  aspirin 81 MG tablet, Take by mouth., Disp: , Rfl:   •  pantoprazole (PROTONIX) 40 MG EC tablet, TAKE 1 TABLET BY MOUTH TWICE A DAY, Disp: 180 tablet, Rfl: 0  •  zolpidem (AMBIEN) 10 MG tablet, Take 1 tablet by mouth At Night As Needed for Sleep., Disp: 30 tablet, Rfl: 4  •  atorvastatin  (LIPITOR) 40 MG tablet, Take 1 tablet by mouth Daily., Disp: 90 tablet, Rfl: 3  •  lisinopril (PRINIVIL,ZESTRIL) 10 MG tablet, Take 1 tablet by mouth Daily., Disp: 90 tablet, Rfl: 3     Objective     History of Present Illness     Review of Systems    Physical Exam  Vitals and nursing note reviewed.   Constitutional:       General: He is not in acute distress.     Appearance: Normal appearance. He is obese. He is not ill-appearing.   HENT:      Head: Normocephalic and atraumatic.   Cardiovascular:      Rate and Rhythm: Normal rate and regular rhythm.      Heart sounds: Murmur heard.      Comments: 1/6 to 2/6 systolic murmur at the upper sternal border  Pulmonary:      Effort: Pulmonary effort is normal. No respiratory distress.      Breath sounds: Normal breath sounds. No wheezing or rales.   Musculoskeletal:         General: Normal range of motion.   Skin:     General: Skin is warm and dry.   Neurological:      General: No focal deficit present.      Mental Status: He is alert and oriented to person, place, and time.   Psychiatric:         Mood and Affect: Mood normal.         Behavior: Behavior normal.         Assessment    ASSESSMENT CMP had a sugar of 95 and other values were normal and hemoglobin A1c is pretty stable at 6.1.  Lipid panel a total cholesterol 136, HDL 51, LDL 67.  TSH was normal and urine microalbumin remains low.  CT scan of the chest in March showed an increase in size of a centrally calcified right lower lobe nodule and follow-up CT was recommended in 3 to 6 months.  #1-hypertension controlled on medication  #2-hyperlipidemia reasonable control on medication but the patient is resistant to adding Zetia  #3-coronary artery disease, asymptomatic  #4-obesity with slight weight loss  #5-history of hyperglycemia with normal sugar and stable hemoglobin A1c, diet controlled  #6-partially calcified right pulmonary nodule    Problems Addressed this Visit        Cardiac and Vasculature    Hyperlipidemia     Hypertension - Primary    Coronary artery disease       Endocrine and Metabolic    Obese    Hyperglycemia       Sleep    Other insomnia   Other Visit Diagnoses     Pulmonary nodule        Relevant Orders    CT Chest With Contrast      Diagnoses       Codes Comments    Primary hypertension    -  Primary ICD-10-CM: I10  ICD-9-CM: 401.9     Hyperlipidemia, unspecified hyperlipidemia type     ICD-10-CM: E78.5  ICD-9-CM: 272.4     Coronary artery disease involving native heart without angina pectoris, unspecified vessel or lesion type     ICD-10-CM: I25.10  ICD-9-CM: 414.01     Class 2 obesity due to excess calories without serious comorbidity with body mass index (BMI) of 38.0 to 38.9 in adult     ICD-10-CM: E66.09, Z68.38  ICD-9-CM: 278.00, V85.38     Hyperglycemia     ICD-10-CM: R73.9  ICD-9-CM: 790.29     Other insomnia     ICD-10-CM: G47.09  ICD-9-CM: 780.52     Pulmonary nodule     ICD-10-CM: R91.1  ICD-9-CM: 793.11           PLAN I recommended the patient gets a COVID-19 booster and he is also due for a Tdap immunization.  I have ordered a CT scan of the chest with contrast for July.  He will continue current medicines as now but declines to start Zetia.  I would like to recheck him in 6 months with a wellness visit and a CBC, CMP, hemoglobin A1c, lipid panel, PSA    There are no Patient Instructions on file for this visit.  Return in about 6 months (around 11/10/2023) for with labs.

## 2023-05-22 RX ORDER — SILDENAFIL 100 MG/1
TABLET, FILM COATED ORAL
Qty: 10 TABLET | Refills: 0 | OUTPATIENT
Start: 2023-05-22

## 2023-05-23 ENCOUNTER — TELEPHONE (OUTPATIENT)
Dept: FAMILY MEDICINE CLINIC | Facility: CLINIC | Age: 68
End: 2023-05-23
Payer: MEDICARE

## 2023-05-23 RX ORDER — SILDENAFIL 100 MG/1
100 TABLET, FILM COATED ORAL DAILY PRN
Qty: 10 TABLET | Refills: 4 | Status: SHIPPED | OUTPATIENT
Start: 2023-05-23

## 2023-05-23 NOTE — TELEPHONE ENCOUNTER
Rx Refill Note  Requested Prescriptions      No prescriptions requested or ordered in this encounter      Last office visit with prescribing clinician: 5/10/2023   Last telemedicine visit with prescribing clinician: Visit date not found   Next office visit with prescribing clinician: 11/22/2023                         Would you like a call back once the refill request has been completed: [] Yes [] No    If the office needs to give you a call back, can they leave a voicemail: [] Yes [] No    Sonia Monae MA  05/23/23, 10:02 EDT

## 2023-05-23 NOTE — TELEPHONE ENCOUNTER
Caller: Antony Tatum    Relationship: Self    Best call back number: [phone]    What was the call regarding: PATIENT IS REQUESTING A REFILL OF SILDENAFIL (NOT SHOWN ON MED LIST). PATIENT STATED HE HAS LESS THAN A THREE DAY SUPPLY LEFT. PLEASE ADVISE.    PHARMACY:    [facility] [address] [place] [address] [facility] [phone] [facility] [phone] FX      Do you require a callback: YES

## 2023-09-18 ENCOUNTER — PATIENT MESSAGE (OUTPATIENT)
Dept: FAMILY MEDICINE CLINIC | Facility: CLINIC | Age: 68
End: 2023-09-18
Payer: MEDICARE

## 2023-09-18 NOTE — TELEPHONE ENCOUNTER
From: Antony Tatum  To: Som Justice  Sent: 9/18/2023 6:52 AM EDT  Subject: chest congestion    Kamari,  Good morning. I've had some congestion for about a week. I thought it might have been some of this late summer pollen, but it's not going away and becoming a bit of an annoyance. I've got people staying with me later in the week and would like to get something to help clear this up. Currently coughing up phlegm and taking Musinix. Thanks.

## 2023-09-19 ENCOUNTER — OFFICE VISIT (OUTPATIENT)
Dept: FAMILY MEDICINE CLINIC | Facility: CLINIC | Age: 68
End: 2023-09-19
Payer: MEDICARE

## 2023-09-19 VITALS
DIASTOLIC BLOOD PRESSURE: 82 MMHG | TEMPERATURE: 97.8 F | HEIGHT: 68 IN | HEART RATE: 59 BPM | BODY MASS INDEX: 38.34 KG/M2 | RESPIRATION RATE: 18 BRPM | OXYGEN SATURATION: 95 % | WEIGHT: 253 LBS | SYSTOLIC BLOOD PRESSURE: 130 MMHG

## 2023-09-19 DIAGNOSIS — R05.1 ACUTE COUGH: Primary | ICD-10-CM

## 2023-09-19 RX ORDER — HYDROCODONE BITARTRATE AND HOMATROPINE METHYLBROMIDE ORAL SOLUTION 5; 1.5 MG/5ML; MG/5ML
5 LIQUID ORAL EVERY 6 HOURS PRN
Qty: 120 ML | Refills: 0 | Status: SHIPPED | OUTPATIENT
Start: 2023-09-19 | End: 2023-09-20 | Stop reason: SDUPTHER

## 2023-09-19 NOTE — PROGRESS NOTES
"Chief Complaint  Cough (COUGH SINCE LAST THURSDAY )    Subjective        Antony Tatum presents to [facility]  History of Present Illness  Patient presented to the clinic today with complaints of cough that started since Thursday last week after doing some yard work outside.  Cough occurs every few hours and has  progressively worsened which sometimes awakens him from sleep at night.  It is mostly dry but sometimes productive of small yellowish sputum especially in the early morning.  No hemoptysis.  Denies fever/chills, chest pain, SOB, nasal congestion, rhinorrhea, sore throat or postnasal drip.  No history of sick contact with someone with chronic cough.  No history of smoking.    Objective   Vital Signs:  /82 (BP Location: Left arm, Patient Position: Sitting, Cuff Size: Adult)   Pulse 59   Temp 97.8 °F (36.6 °C) (Oral)   Resp 18   Ht 172.7 cm (67.99\")   Wt 115 kg (253 lb)   SpO2 95%   BMI 38.48 kg/m²   Estimated body mass index is 38.48 kg/m² as calculated from the following:    Height as of this encounter: 172.7 cm (67.99\").    Weight as of this encounter: 115 kg (253 lb).             Physical Exam  Cardiovascular:      Rate and Rhythm: Normal rate and regular rhythm.      Heart sounds: Normal heart sounds.   Pulmonary:      Effort: Pulmonary effort is normal.      Breath sounds: Normal breath sounds and air entry. No wheezing or rhonchi.      Result Review :                   Assessment and Plan   Diagnoses and all orders for this visit:    1. Acute cough (Primary)  Comments:  Acute dry cough for the past 6 days. Vital signs stable & normal lung exam.  I will give him cough syrup to take every 6 hours as needed for symptomatic relief  Orders:  -     HYDROcodone Bit-Homatrop MBr (HYCODAN) 5-1.5 MG/5ML solution; Take 5 mL by mouth Every 6 (Six) Hours As Needed for Cough.  Dispense: 120 mL; Refill: 0             Follow Up   Return for Next scheduled follow up.   "

## 2023-09-20 ENCOUNTER — TELEPHONE (OUTPATIENT)
Dept: FAMILY MEDICINE CLINIC | Facility: CLINIC | Age: 68
End: 2023-09-20

## 2023-09-20 DIAGNOSIS — R05.1 ACUTE COUGH: ICD-10-CM

## 2023-09-20 RX ORDER — HYDROCODONE BITARTRATE AND HOMATROPINE METHYLBROMIDE ORAL SOLUTION 5; 1.5 MG/5ML; MG/5ML
5 LIQUID ORAL EVERY 6 HOURS PRN
Qty: 120 ML | Refills: 0 | Status: SHIPPED | OUTPATIENT
Start: 2023-09-20

## 2023-09-20 NOTE — TELEPHONE ENCOUNTER
Caller: Antnoy Tatum    Relationship: Self    Best call back number: [phone]    What medication are you requesting: HYDROcodone Bit-Homatrop MBr (HYCODAN) 5-1.5 MG/5ML solution     If a prescription is needed, what is your preferred pharmacy and phone number: [id number] [address] AT [facility] - [phone] [facility] [phone] FX     Additional notes:PATIENT STATED THAT HE SAW DR SAENZ ON 9/19/23 AND SHE PRESCRIBED HIM HYDROcodone Bit-Homatrop MBr (HYCODAN) 5-1.5 MG/5ML solution  AND SENT IT TO THE [facility]. PATIENT STATE THAT THE PHARMACY TOLD HIM BECAUSE IT CONTAINED HYDROCODONE THEY WOULD ONLY FILL 2 OUNCES AT A TIME AND RECOMMEND HE CALL ANOTHER PHARMACY SO PATIENT IS REQUESTING IF THE PRESCRIPTION CAN BE SENT TO THE ABOVE PHARMACY.

## 2023-09-26 ENCOUNTER — TELEPHONE (OUTPATIENT)
Dept: FAMILY MEDICINE CLINIC | Facility: CLINIC | Age: 68
End: 2023-09-26

## 2023-09-26 RX ORDER — ALPRAZOLAM 0.5 MG/1
0.5 TABLET ORAL 2 TIMES DAILY PRN
Qty: 10 TABLET | Refills: 0 | Status: SHIPPED | OUTPATIENT
Start: 2023-09-26

## 2023-09-26 NOTE — TELEPHONE ENCOUNTER
Caller: Antony Tatum    Relationship: Self    Best call back number: [phone]*    What medication are you requesting: ALPRAZOLAM .5MG    What are your current symptoms: ONLY USES WHEN FLYING. LAST PRESCRIBED IN 2007, AND ONLY TOOK WHEN PATIENT HAD TO FLY.     Have you had these symptoms before:    [x] Yes  [] No    Have you been treated for these symptoms before:   [x] Yes  [] No    If a prescription is needed, what is your preferred pharmacy and phone number: [facility] [address] [place] [address] [facility] [phone] [facility] [phone]      Additional notes:  PATIENT STATES THAT HE JUST FOUND OUT THAT HE HAS TO FLY TOMORROW MORNING AT 6AM, AND IS REQUESTING THIS MEDICATION TO BE SENT TO HIS PHARMACY TODAY.

## 2023-10-04 ENCOUNTER — PATIENT MESSAGE (OUTPATIENT)
Dept: FAMILY MEDICINE CLINIC | Facility: CLINIC | Age: 68
End: 2023-10-04
Payer: MEDICARE

## 2023-10-04 RX ORDER — AMLODIPINE BESYLATE 10 MG/1
10 TABLET ORAL DAILY
Qty: 30 TABLET | Refills: 2 | Status: SHIPPED | OUTPATIENT
Start: 2023-10-04

## 2023-10-04 RX ORDER — PANTOPRAZOLE SODIUM 40 MG/1
TABLET, DELAYED RELEASE ORAL
Qty: 180 TABLET | Refills: 0 | Status: SHIPPED | OUTPATIENT
Start: 2023-10-04

## 2023-10-11 ENCOUNTER — HOSPITAL ENCOUNTER (OUTPATIENT)
Dept: CT IMAGING | Facility: HOSPITAL | Age: 68
Discharge: HOME | End: 2023-10-11
Admitting: INTERNAL MEDICINE
Payer: MEDICARE

## 2023-10-11 DIAGNOSIS — R91.1 PULMONARY NODULE: ICD-10-CM

## 2023-10-11 LAB — CREAT BLDA-MCNC: 0.7 MG/DL (ref 0.6–1.3)

## 2023-10-11 PROCEDURE — 25510000001 IOPAMIDOL 61 % SOLUTION: Performed by: INTERNAL MEDICINE

## 2023-10-11 PROCEDURE — 82565 ASSAY OF CREATININE: CPT

## 2023-10-11 PROCEDURE — 71260 CT THORAX DX C+: CPT

## 2023-10-11 RX ADMIN — IOPAMIDOL 75 ML: 612 INJECTION, SOLUTION INTRAVENOUS at 08:25

## 2023-10-12 RX ORDER — AZITHROMYCIN 250 MG/1
TABLET, FILM COATED ORAL
Qty: 6 TABLET | Refills: 0 | Status: SHIPPED | OUTPATIENT
Start: 2023-10-12

## 2023-11-22 ENCOUNTER — OFFICE VISIT (OUTPATIENT)
Dept: FAMILY MEDICINE CLINIC | Facility: CLINIC | Age: 68
End: 2023-11-22
Payer: MEDICARE

## 2023-11-22 VITALS
WEIGHT: 256.5 LBS | HEART RATE: 91 BPM | RESPIRATION RATE: 18 BRPM | TEMPERATURE: 97.8 F | HEIGHT: 68 IN | OXYGEN SATURATION: 96 % | SYSTOLIC BLOOD PRESSURE: 118 MMHG | BODY MASS INDEX: 38.88 KG/M2 | DIASTOLIC BLOOD PRESSURE: 76 MMHG

## 2023-11-22 DIAGNOSIS — I10 PRIMARY HYPERTENSION: Primary | ICD-10-CM

## 2023-11-22 DIAGNOSIS — R73.9 HYPERGLYCEMIA: ICD-10-CM

## 2023-11-22 DIAGNOSIS — E66.09 CLASS 2 OBESITY DUE TO EXCESS CALORIES WITHOUT SERIOUS COMORBIDITY WITH BODY MASS INDEX (BMI) OF 39.0 TO 39.9 IN ADULT: ICD-10-CM

## 2023-11-22 DIAGNOSIS — K21.9 GASTROESOPHAGEAL REFLUX DISEASE WITHOUT ESOPHAGITIS: ICD-10-CM

## 2023-11-22 DIAGNOSIS — I25.10 CORONARY ARTERY DISEASE INVOLVING NATIVE HEART WITHOUT ANGINA PECTORIS, UNSPECIFIED VESSEL OR LESION TYPE: ICD-10-CM

## 2023-11-22 DIAGNOSIS — E78.5 HYPERLIPIDEMIA, UNSPECIFIED HYPERLIPIDEMIA TYPE: ICD-10-CM

## 2023-11-22 RX ORDER — ATORVASTATIN CALCIUM 40 MG/1
40 TABLET, FILM COATED ORAL DAILY
Qty: 90 TABLET | Refills: 3 | Status: SHIPPED | OUTPATIENT
Start: 2023-11-22

## 2023-11-22 RX ORDER — AMLODIPINE BESYLATE 10 MG/1
10 TABLET ORAL DAILY
Qty: 90 TABLET | Refills: 3 | Status: SHIPPED | OUTPATIENT
Start: 2023-11-22

## 2023-11-22 NOTE — PROGRESS NOTES
Subjective   Antony Tatum is a 67 y.o. male. Patient is here today for follow-up on his hypertension, hyperlipidemia, history of coronary artery disease, obesity and hyperglycemia.  He also has GERD, controlled on pantoprazole.  He generally feels well and has no acute complaints  Chief Complaint   Patient presents with    Hypertension    Hyperlipidemia    Follow-up     Lab f/u no concerns today           Vitals:    11/22/23 0754   BP: 118/76   Pulse: 91   Resp: 18   Temp: 97.8 °F (36.6 °C)   SpO2: 96%     Body mass index is 39.01 kg/m².  The following portions of the patient's history were reviewed and updated as appropriate: allergies, current medications, past family history, past medical history, past social history, past surgical history and problem list.    Past Medical History:   Diagnosis Date    Anxiety     Coronary artery disease     Depression     Diverticulosis     Erosive gastritis     Family hx of colon cancer     H/O CT scan of abdomen 01/29/2014    ddd, tics, tortuosity w/aortoiliac atherosclerosis, HH, cardiac enlargement    Heart attack     Hepatitis     HEP C    Hiatal hernia     History of CT scan of abdomen     Hyperlipidemia     Hypertension     Obese     Thoracic aortic aneurysm without rupture       No Known Allergies   Social History     Socioeconomic History    Marital status:    Tobacco Use    Smoking status: Never     Passive exposure: Never    Smokeless tobacco: Never   Substance and Sexual Activity    Alcohol use: Yes     Alcohol/week: 2.0 standard drinks of alcohol     Types: 2 Shots of liquor per week     Comment: weekly    Drug use: No    Sexual activity: Defer        Current Outpatient Medications:     ALPRAZolam (XANAX) 0.5 MG tablet, Take 1 tablet by mouth 2 (Two) Times a Day As Needed for Anxiety., Disp: 10 tablet, Rfl: 0    amLODIPine (NORVASC) 10 MG tablet, Take 1 tablet by mouth Daily., Disp: 90 tablet, Rfl: 3    aspirin 81 MG tablet, Take by mouth., Disp: , Rfl:      atorvastatin (LIPITOR) 40 MG tablet, Take 1 tablet by mouth Daily., Disp: 90 tablet, Rfl: 3    HYDROcodone Bit-Homatrop MBr (HYCODAN) 5-1.5 MG/5ML solution, Take 5 mL by mouth Every 6 (Six) Hours As Needed for Cough., Disp: 120 mL, Rfl: 0    pantoprazole (PROTONIX) 40 MG EC tablet, TAKE 1 TABLET BY MOUTH TWICE A DAY, Disp: 180 tablet, Rfl: 0    sildenafil (VIAGRA) 100 MG tablet, Take 1 tablet by mouth Daily As Needed for Erectile Dysfunction., Disp: 10 tablet, Rfl: 4     Objective     History of Present Illness     Review of Systems    Physical Exam  Vitals and nursing note reviewed.   Constitutional:       General: He is not in acute distress.     Appearance: Normal appearance. He is obese. He is not ill-appearing.   HENT:      Head: Normocephalic and atraumatic.   Cardiovascular:      Rate and Rhythm: Normal rate and regular rhythm.      Heart sounds: Normal heart sounds.   Pulmonary:      Effort: Pulmonary effort is normal. No respiratory distress.      Breath sounds: Normal breath sounds. No wheezing or rales.   Skin:     General: Skin is warm and dry.   Neurological:      General: No focal deficit present.      Mental Status: He is alert and oriented to person, place, and time.   Psychiatric:         Mood and Affect: Mood normal.         Behavior: Behavior normal.         Assessment    ASSESSMENT CBC was normal.  Lipid panel is stable with total cholesterol 140, HDL 58 and LDL 69.  PSA was stable at 0.912.  CMP was essentially normal and hemoglobin A1c was improved to 5.9.  #1-hypertension controlled on medication  #2-hyperlipidemia controlled on medication  #3-history of coronary artery disease, asymptomatic and stable  #4-hyperglycemia with improved acceptable hemoglobin A1c, diet controlled  #5-obesity with no significant weight loss    Problems Addressed this Visit          Cardiac and Vasculature    Hyperlipidemia    Relevant Medications    atorvastatin (LIPITOR) 40 MG tablet    Hypertension - Primary     Relevant Medications    amLODIPine (NORVASC) 10 MG tablet    Coronary artery disease    Relevant Medications    amLODIPine (NORVASC) 10 MG tablet       Endocrine and Metabolic    Obese    Hyperglycemia     Diagnoses         Codes Comments    Primary hypertension    -  Primary ICD-10-CM: I10  ICD-9-CM: 401.9     Hyperlipidemia, unspecified hyperlipidemia type     ICD-10-CM: E78.5  ICD-9-CM: 272.4     Coronary artery disease involving native heart without angina pectoris, unspecified vessel or lesion type     ICD-10-CM: I25.10  ICD-9-CM: 414.01     Class 2 obesity due to excess calories without serious comorbidity with body mass index (BMI) of 39.0 to 39.9 in adult     ICD-10-CM: E66.09, Z68.39  ICD-9-CM: 278.00, V85.39     Hyperglycemia     ICD-10-CM: R73.9  ICD-9-CM: 790.29             PLAN patient will continue current medicines as now.  I encouraged him to try and lose some weight and watch dietary carbs and sweets.  He can be rechecked in 6 months with a CMP, hemoglobin A1c, lipid panel and TSH    There are no Patient Instructions on file for this visit.  Return in about 6 months (around 5/22/2024) for with labs.

## 2024-01-09 NOTE — PROGRESS NOTES
Physician Transition of Care Summary  Invasive Cardiovascular Lab    Procedure Date: 1/8/2024  Attending:    * Laurel Summers - Primary  Resident/Fellow/Other Assistant: Surgeon(s) and Role:    Indications:   Atrial fibrillation    Post-procedure diagnosis:   Atrial fibrillation converted to SR    Procedure(s):   External cardioversion        Procedure Findings:   Patient was in atrial fibrillation with rapid ventricular response.  Patient was adequately anticoagulated.  After informed consent the MAC reduced by provided by anesthesia provider,Patient was cardioverted using 300 J of biphasic DC shock successfully converting him to sinus rhythm.     Description of the Procedure:   Patient was in atrial fibrillation with rapid ventricular response.  Patient was adequately anticoagulated.  After informed consent the MAC reduced by provided by anesthesia provider,Patient was cardioverted using 300 J of biphasic DC shock successfully converting him to sinus rhythm.     Complications:   None    Stents/Implants:   Cardiovascular Implants       Stent    Stent, Ureteral Contour, 6fr X 26cm Case [id number] - Implanted        Model/Cat number: [id number] : [employer]    Lot number: [id number]        As of 9/1/2023       Status: Unknown                              Anticoagulation/Antiplatelet Plan:   Continue warfarin    Estimated Blood Loss:   * No values recorded between 1/8/2024 12:00 AM and 1/8/2024 10:43 PM *    Anesthesia: Monitor Anesthesia Care Anesthesia Staff: CRNA: JOSELITO Pablo-CRNA    Any Specimen(s) Removed:   No specimens collected during this procedure.    Disposition:   Continue to monitor in ICU   Continue current management per primary team  Amiodarone 200 mg TID for one week followed by 200 m    g daily to continue  Follow up with cardiology in one month      Electronically signed by: Laurel Summers MD, 1/8/2024 10:43 PM   Subjective   Antony Tatum is a 61 y.o. male.   Chief Complaint   Patient presents with   • Cough     pt states has been going on for about a week 11/14/17   • chest congestion     Vitals:    11/21/17 1441   BP: 140/64   Pulse: 69   Resp: 16   Temp: 98.6 °F (37 °C)   SpO2: 94%     No LMP for male patient.    History of Present Illness  Antony is a patient of Dr Justice who is here for an acute visit. He c/o chest congestion, productive cough, and wheezing that started over a week ago. He denies fever, chills or body aches. He is requesting an rx for hydrocodone cough syrup, which he has been given before by his previous pcp. He has taken mucinex otc     The following portions of the patient's history were reviewed and updated as appropriate: allergies, current medications, past family history, past medical history, past social history, past surgical history and problem list.    Review of Systems   Constitutional: Positive for fatigue. Negative for chills and fever.   HENT: Positive for congestion. Negative for ear pain, postnasal drip, rhinorrhea, sinus pain and sinus pressure.    Respiratory: Positive for cough, chest tightness (with cough ) and wheezing. Negative for shortness of breath.    Cardiovascular: Negative.        Objective   Physical Exam   Constitutional: Vital signs are normal. He appears well-developed and well-nourished. He appears ill. No distress.   HENT:   Nose: Mucosal edema present.   Mouth/Throat: Posterior oropharyngeal erythema present.   Cardiovascular: Normal rate and regular rhythm.    Pulmonary/Chest: Effort normal. He has decreased breath sounds. He has wheezes in the right upper field. He has no rhonchi. He has no rales.   Neurological: He is alert.       Assessment/Plan   Antony was seen today for cough and chest congestion.    Diagnoses and all orders for this visit:    Acute bronchitis, unspecified organism    Other orders  -     HYDROcodone-homatropine (HYCODAN) 5-1.5 MG/5ML syrup; Take 5  mL by mouth Every 8 (Eight) Hours As Needed for Cough.  -     azithromycin (ZITHROMAX Z-THEODORA) 250 MG tablet; Take 2 tablets the first day, then 1 tablet daily for 4 days.  -     MethylPREDNISolone (MEDROL, THEODORA,) 4 MG tablet; Take as directed on package instructions.    rest and fluids  Continue mucinex   Follow up if symptoms persist, worsen or if you develop new symptoms

## 2024-01-12 ENCOUNTER — TELEPHONE (OUTPATIENT)
Dept: GASTROENTEROLOGY | Facility: CLINIC | Age: 69
End: 2024-01-12
Payer: MEDICARE

## 2024-01-12 RX ORDER — PANTOPRAZOLE SODIUM 40 MG/1
40 TABLET, DELAYED RELEASE ORAL 2 TIMES DAILY
Qty: 180 TABLET | Refills: 0 | Status: SHIPPED | OUTPATIENT
Start: 2024-01-12

## 2024-01-12 NOTE — TELEPHONE ENCOUNTER
----- Message from Nancy Sanon RN sent at 1/12/2024  7:52 AM EST -----  Regarding: FW: Pantoprazole  Contact: [phone]    ----- Message -----  From: Antony Tatum  Sent: 1/11/2024   3:53 PM EST  To: [facility]  Subject: Pantoprazole                                     I need  another prescription of Pantoprozole (180 pills) but I've changed  pharmacies. Can someone please call it in to my new pharmacy...[facility] [phone].  Thank you very much.  Antony Tatum

## 2024-01-12 NOTE — TELEPHONE ENCOUNTER
Schedule annual f/u - due this month       Called pt and advised of the above.  Pt verb understanding and reports that he is out of his home and will call us back to make appt. Advised pt to get further refills, he will need to make appt. Verb understanding.

## 2024-01-22 RX ORDER — ATORVASTATIN CALCIUM 40 MG/1
40 TABLET, FILM COATED ORAL DAILY
Qty: 90 TABLET | Refills: 0 | Status: SHIPPED | OUTPATIENT
Start: 2024-01-22 | End: 2024-01-25 | Stop reason: SDUPTHER

## 2024-01-25 RX ORDER — ATORVASTATIN CALCIUM 40 MG/1
40 TABLET, FILM COATED ORAL DAILY
Qty: 90 TABLET | Refills: 0 | Status: SHIPPED | OUTPATIENT
Start: 2024-01-25

## 2024-04-15 ENCOUNTER — OFFICE VISIT (OUTPATIENT)
Dept: FAMILY MEDICINE CLINIC | Facility: CLINIC | Age: 69
End: 2024-04-15
Payer: MEDICARE

## 2024-04-15 VITALS
HEIGHT: 68 IN | RESPIRATION RATE: 18 BRPM | HEART RATE: 64 BPM | WEIGHT: 264.5 LBS | TEMPERATURE: 97.5 F | OXYGEN SATURATION: 95 % | DIASTOLIC BLOOD PRESSURE: 86 MMHG | SYSTOLIC BLOOD PRESSURE: 132 MMHG | BODY MASS INDEX: 40.09 KG/M2

## 2024-04-15 DIAGNOSIS — R60.0 BILATERAL LOWER EXTREMITY EDEMA: Primary | ICD-10-CM

## 2024-04-15 PROCEDURE — 3075F SYST BP GE 130 - 139MM HG: CPT | Performed by: INTERNAL MEDICINE

## 2024-04-15 PROCEDURE — 99213 OFFICE O/P EST LOW 20 MIN: CPT | Performed by: INTERNAL MEDICINE

## 2024-04-15 PROCEDURE — 3079F DIAST BP 80-89 MM HG: CPT | Performed by: INTERNAL MEDICINE

## 2024-04-19 NOTE — PROGRESS NOTES
Subjective   Antony Tatum is a 68 y.o. male. Patient is here today for   Chief Complaint   Patient presents with    Hypertension    Hyperlipidemia    Foot Swelling     Foot swelling for 3-4 weeks           Vitals:    04/15/24 1524   BP: 132/86   Pulse: 64   Resp: 18   Temp: 97.5 °F (36.4 °C)   SpO2: 95%     Body mass index is 40.23 kg/m².      Past Medical History:   Diagnosis Date    Anxiety     Coronary artery disease     Depression     Diverticulosis     Erosive gastritis     Family hx of colon cancer     H/O CT scan of abdomen 01/29/2014    ddd, tics, tortuosity w/aortoiliac atherosclerosis, HH, cardiac enlargement    Heart attack     Hepatitis     HEP C    Hiatal hernia     History of CT scan of abdomen     Hyperlipidemia     Hypertension     Obese     Thoracic aortic aneurysm without rupture       No Known Allergies   Social History     Socioeconomic History    Marital status:    Tobacco Use    Smoking status: Never     Passive exposure: Never    Smokeless tobacco: Never   Substance and Sexual Activity    Alcohol use: Yes     Alcohol/week: 2.0 standard drinks of alcohol     Types: 2 Shots of liquor per week     Comment: weekly    Drug use: No    Sexual activity: Defer        Current Outpatient Medications:     ALPRAZolam (XANAX) 0.5 MG tablet, Take 1 tablet by mouth 2 (Two) Times a Day As Needed for Anxiety., Disp: 10 tablet, Rfl: 0    amLODIPine (NORVASC) 10 MG tablet, Take 1 tablet by mouth Daily., Disp: 90 tablet, Rfl: 3    aspirin 81 MG tablet, Take by mouth., Disp: , Rfl:     atorvastatin (LIPITOR) 40 MG tablet, Take 1 tablet by mouth Daily., Disp: 90 tablet, Rfl: 0    HYDROcodone Bit-Homatrop MBr (HYCODAN) 5-1.5 MG/5ML solution, Take 5 mL by mouth Every 6 (Six) Hours As Needed for Cough., Disp: 120 mL, Rfl: 0    pantoprazole (PROTONIX) 40 MG EC tablet, Take 1 tablet by mouth 2 (Two) Times a Day., Disp: 180 tablet, Rfl: 0    sildenafil (VIAGRA) 100 MG tablet, Take 1 tablet by mouth Daily As  Needed for Erectile Dysfunction., Disp: 10 tablet, Rfl: 4     Objective     History of Present Illness  This patient is here with a complaint of swelling in his feet bilaterally and he had a bit of tenderness on palpation of the medial left calf.    This is been going on for 3 to 4 weeks.  He has been down in [place].  He developed this symptom before he started on his way home.  His wife felt he should have someone addressed this issue.  Hypertension    Hyperlipidemia         Review of Systems   Constitutional: Negative.    HENT: Negative.     Respiratory: Negative.     Cardiovascular: Negative.    Musculoskeletal:         He has bilateral lower extremity edema and some slight pain with left lower extremity.  But this is only when he palpates that firm area on the medial calf.       Physical Exam  Vitals and nursing note reviewed.   Constitutional:       Appearance: Normal appearance.      Comments: Pleasant, neatly groomed, no distress.   Cardiovascular:      Rate and Rhythm: Regular rhythm.      Heart sounds: Normal heart sounds. No murmur heard.     No gallop.   Pulmonary:      Effort: No respiratory distress.      Breath sounds: Normal breath sounds. No wheezing or rales.   Musculoskeletal:      Comments: He has some moderate bilateral lower extremity edema.  This is nonpitting.    There were no palpable cords but there was a firm, small area on the medial aspect of the left calf that was slightly painful on palpation.   Neurological:      Mental Status: He is alert and oriented to person, place, and time.   Psychiatric:         Mood and Affect: Mood normal.         Behavior: Behavior normal.         Thought Content: Thought content normal.           Problems Addressed this Visit    None  Visit Diagnoses       Bilateral lower extremity edema    -  Primary    Relevant Orders    Duplex Venous Lower Extremity - Bilateral CAR          Diagnoses         Codes Comments    Bilateral lower extremity edema    -  Primary  ICD-10-CM: R60.0  ICD-9-CM: 782.3               PLAN  I put an order in for bilateral lower extremity venous Doppler to rule out DVT.    Hopefully there will be an abnormality there however he is taking amlodipine certainly that could contribute to his lower extremity edema.    If there is no blood clot to account for the swelling I will ask him to stop taking amlodipine.  No follow-ups on file.

## 2024-04-22 ENCOUNTER — HOSPITAL ENCOUNTER (OUTPATIENT)
Dept: CARDIOLOGY | Facility: HOSPITAL | Age: 69
Discharge: HOME | End: 2024-04-22
Admitting: INTERNAL MEDICINE
Payer: MEDICARE

## 2024-04-22 LAB

## 2024-04-22 PROCEDURE — 93970 EXTREMITY STUDY: CPT

## 2024-04-22 PROCEDURE — 93970 EXTREMITY STUDY: CPT | Performed by: SURGERY

## 2024-06-05 ENCOUNTER — OFFICE VISIT (OUTPATIENT)
Dept: FAMILY MEDICINE CLINIC | Facility: CLINIC | Age: 69
End: 2024-06-05
Payer: MEDICARE

## 2024-06-05 VITALS
BODY MASS INDEX: 38.94 KG/M2 | DIASTOLIC BLOOD PRESSURE: 72 MMHG | RESPIRATION RATE: 16 BRPM | HEART RATE: 76 BPM | WEIGHT: 256.9 LBS | OXYGEN SATURATION: 93 % | HEIGHT: 68 IN | SYSTOLIC BLOOD PRESSURE: 126 MMHG | TEMPERATURE: 97 F

## 2024-06-05 DIAGNOSIS — E66.09 CLASS 2 OBESITY DUE TO EXCESS CALORIES WITHOUT SERIOUS COMORBIDITY WITH BODY MASS INDEX (BMI) OF 39.0 TO 39.9 IN ADULT: ICD-10-CM

## 2024-06-05 DIAGNOSIS — R91.1 RIGHT UPPER LOBE PULMONARY NODULE: Primary | ICD-10-CM

## 2024-06-05 DIAGNOSIS — E78.5 HYPERLIPIDEMIA, UNSPECIFIED HYPERLIPIDEMIA TYPE: ICD-10-CM

## 2024-06-05 DIAGNOSIS — I10 PRIMARY HYPERTENSION: ICD-10-CM

## 2024-06-05 DIAGNOSIS — I71.21 ANEURYSM OF ASCENDING AORTA WITHOUT RUPTURE: ICD-10-CM

## 2024-06-05 PROCEDURE — 3074F SYST BP LT 130 MM HG: CPT | Performed by: INTERNAL MEDICINE

## 2024-06-05 PROCEDURE — 3078F DIAST BP <80 MM HG: CPT | Performed by: INTERNAL MEDICINE

## 2024-06-05 PROCEDURE — 99213 OFFICE O/P EST LOW 20 MIN: CPT | Performed by: INTERNAL MEDICINE

## 2024-06-05 PROCEDURE — 1126F AMNT PAIN NOTED NONE PRSNT: CPT | Performed by: INTERNAL MEDICINE

## 2024-06-05 RX ORDER — LISINOPRIL 5 MG/1
5 TABLET ORAL DAILY
COMMUNITY

## 2024-06-07 NOTE — PROGRESS NOTES
Subjective   Antony Tatum is a 68 y.o. male. Patient is here today for   Chief Complaint   Patient presents with    Hypertension          Vitals:    06/05/24 0754   BP: 126/72   Pulse: 76   Resp: 16   Temp: 97 °F (36.1 °C)   SpO2: 93%     Body mass index is 39.07 kg/m².      Past Medical History:   Diagnosis Date    Anxiety     Coronary artery disease     Depression     Diverticulosis     Erosive gastritis     Family hx of colon cancer     H/O CT scan of abdomen 01/29/2014    ddd, tics, tortuosity w/aortoiliac atherosclerosis, HH, cardiac enlargement    Heart attack     Hepatitis     HEP C    Hiatal hernia     History of CT scan of abdomen     Hyperlipidemia     Hypertension     Obese     Thoracic aortic aneurysm without rupture       No Known Allergies   Social History     Socioeconomic History    Marital status:    Tobacco Use    Smoking status: Never     Passive exposure: Never    Smokeless tobacco: Never   Substance and Sexual Activity    Alcohol use: Yes     Alcohol/week: 2.0 standard drinks of alcohol     Types: 2 Shots of liquor per week     Comment: weekly    Drug use: No    Sexual activity: Defer        Current Outpatient Medications:     ALPRAZolam (XANAX) 0.5 MG tablet, Take 1 tablet by mouth 2 (Two) Times a Day As Needed for Anxiety., Disp: 10 tablet, Rfl: 0    aspirin 81 MG tablet, Take by mouth., Disp: , Rfl:     atorvastatin (LIPITOR) 40 MG tablet, Take 1 tablet by mouth Daily., Disp: 90 tablet, Rfl: 0    lisinopril (PRINIVIL,ZESTRIL) 5 MG tablet, Take 1 tablet by mouth Daily., Disp: , Rfl:     pantoprazole (PROTONIX) 40 MG EC tablet, Take 1 tablet by mouth 2 (Two) Times a Day., Disp: 180 tablet, Rfl: 0    sildenafil (VIAGRA) 100 MG tablet, Take 1 tablet by mouth Daily As Needed for Erectile Dysfunction., Disp: 10 tablet, Rfl: 4     Objective     History of Present Illness  This patient is here to follow-up on hypertension.    He tells me he is feeling pretty well.    When I saw him in April  this year he had complained of bilateral lower extremity edema.    I had asked him to taper and discontinue his amlodipine and as he has done so he was found his bilateral lower extremity edema has resolved.  Hypertension         Review of Systems   Constitutional: Negative.    HENT: Negative.     Respiratory: Negative.     Cardiovascular: Negative.        Physical Exam  Vitals and nursing note reviewed.   Constitutional:       Appearance: Normal appearance.      Comments: Pleasant, neatly groomed, in no distress.   Cardiovascular:      Rate and Rhythm: Regular rhythm.      Heart sounds: Normal heart sounds. No murmur heard.     No gallop.   Pulmonary:      Effort: No respiratory distress.      Breath sounds: Normal breath sounds. No wheezing or rales.   Neurological:      Mental Status: He is alert and oriented to person, place, and time.   Psychiatric:         Mood and Affect: Mood normal.         Behavior: Behavior normal.         Thought Content: Thought content normal.           Problems Addressed this Visit          Cardiac and Vasculature    Hyperlipidemia    Hypertension    Relevant Medications    lisinopril (PRINIVIL,ZESTRIL) 5 MG tablet    Aneurysm of thoracic aorta       Endocrine and Metabolic    Obese     Other Visit Diagnoses       Right upper lobe pulmonary nodule    -  Primary          Diagnoses         Codes Comments    Right upper lobe pulmonary nodule    -  Primary ICD-10-CM: R91.1  ICD-9-CM: 793.11     Aneurysm of ascending aorta without rupture     ICD-10-CM: I71.21  ICD-9-CM: 441.2     Hyperlipidemia, unspecified hyperlipidemia type     ICD-10-CM: E78.5  ICD-9-CM: 272.4     Class 2 obesity due to excess calories without serious comorbidity with body mass index (BMI) of 39.0 to 39.9 in adult     ICD-10-CM: E66.09, Z68.39  ICD-9-CM: 278.00, V85.39     Primary hypertension     ICD-10-CM: I10  ICD-9-CM: 401.9               PLAN  His hypercholesterolemia is well-controlled.    His hypertension is  well-controlled.    He and I reviewed his CT scan of his chest from October 11.  He has an ascending aortic aneurysm.  He and I did spend some time discussing this today.  He tells me he was not aware that he had an aneurysm.    Review of his chart shows aneurysmal enlargement of the ascending aorta of approximately 4.7 cm in diameter at least back to August 2018.    The radiologist had recommended a follow-up CT chest with contrast in 6 to 12 months to follow-up with a calcified nodule in the superior segment of the right lower lobe.    I suggested a CT scan with contrast of his chest to follow-up on both his aneurysm and the calcium for good right lower lobe nodule as recommended by the radiologist however the patient prefers to do this in 6 months so he would be due about November this year.    I asked the patient to follow-up in November for a Medicare annual wellness visit.  Fasting labs prior to that visit should include: Lipid profile, comprehensive metabolic panel, CBC, urinalysis, and hemoglobin A1c (follow-up on hyperglycemia).      No follow-ups on file.

## 2024-06-21 ENCOUNTER — PATIENT MESSAGE (OUTPATIENT)
Dept: FAMILY MEDICINE CLINIC | Facility: CLINIC | Age: 69
End: 2024-06-21
Payer: MEDICARE

## 2024-06-25 ENCOUNTER — TELEPHONE (OUTPATIENT)
Dept: GASTROENTEROLOGY | Facility: CLINIC | Age: 69
End: 2024-06-25
Payer: MEDICARE

## 2024-06-25 DIAGNOSIS — K21.9 GASTROESOPHAGEAL REFLUX DISEASE WITHOUT ESOPHAGITIS: Primary | ICD-10-CM

## 2024-06-25 RX ORDER — PANTOPRAZOLE SODIUM 40 MG/1
40 TABLET, DELAYED RELEASE ORAL 2 TIMES DAILY
Qty: 180 TABLET | Refills: 0 | Status: SHIPPED | OUTPATIENT
Start: 2024-06-25

## 2024-06-25 RX ORDER — PANTOPRAZOLE SODIUM 40 MG/1
40 TABLET, DELAYED RELEASE ORAL 2 TIMES DAILY
Qty: 180 TABLET | Refills: 0 | OUTPATIENT
Start: 2024-06-25

## 2024-06-25 RX ORDER — PANTOPRAZOLE SODIUM 40 MG/1
40 TABLET, DELAYED RELEASE ORAL 2 TIMES DAILY
Qty: 180 TABLET | Refills: 0 | Status: CANCELLED | OUTPATIENT
Start: 2024-06-25

## 2024-06-25 NOTE — TELEPHONE ENCOUNTER
Caller: Antony Tatum ANDRES    Relationship: Self    Best call back number: [phone]    Requested Prescriptions:   Requested Prescriptions     Pending Prescriptions Disp Refills    pantoprazole (PROTONIX) 40 MG EC tablet 180 tablet 0     Sig: Take 1 tablet by mouth 2 (Two) Times a Day.        Pharmacy where request should be sent:    [address] [address] [facility] - [phone]  - [phone] FX   [id number] [place] [id number]   Phone: [phone] Fax: [phone]   Hours: Not open 24 hours       Last office visit with prescribing clinician: 1/30/2023   Last telemedicine visit with prescribing clinician: Visit date not found   Next office visit with prescribing clinician: 9/11/2024     Additional details provided by patient: PT STATED HE NEEDED TO MAKE A MED REFILL APPT WHICH HE DID SCHEDULE    Does the patient have less than a 3 day supply:  [x] Yes  [] No    Would you like a call back once the refill request has been completed: [] Yes [x] No    If the office needs to give you a call back, can they leave a voicemail: [] Yes [x] No    Donovan Nino   06/25/24 08:22 EDT

## 2024-06-26 RX ORDER — LISINOPRIL 5 MG/1
5 TABLET ORAL DAILY
Qty: 90 TABLET | Refills: 1 | Status: SHIPPED | OUTPATIENT
Start: 2024-06-26

## 2024-08-16 ENCOUNTER — TELEPHONE (OUTPATIENT)
Dept: FAMILY MEDICINE CLINIC | Facility: CLINIC | Age: 69
End: 2024-08-16

## 2024-08-16 ENCOUNTER — TELEMEDICINE (OUTPATIENT)
Dept: FAMILY MEDICINE CLINIC | Facility: CLINIC | Age: 69
End: 2024-08-16
Payer: MEDICARE

## 2024-08-16 VITALS — HEIGHT: 68 IN | BODY MASS INDEX: 36.37 KG/M2 | WEIGHT: 240 LBS

## 2024-08-16 DIAGNOSIS — R19.7 ACUTE DIARRHEA: Primary | ICD-10-CM

## 2024-08-16 PROCEDURE — 99213 OFFICE O/P EST LOW 20 MIN: CPT | Performed by: STUDENT IN AN ORGANIZED HEALTH CARE EDUCATION/TRAINING PROGRAM

## 2024-08-16 PROCEDURE — 1126F AMNT PAIN NOTED NONE PRSNT: CPT | Performed by: STUDENT IN AN ORGANIZED HEALTH CARE EDUCATION/TRAINING PROGRAM

## 2024-08-16 NOTE — PROGRESS NOTES
Office Note     Name: Antony Tatum    : 1955     MRN: [id number]     Mode of Visit: Video  Location of patient: home  Location of provider: [facility]  You have chosen to receive care through a telehealth visit.  Does the patient consent to use a video/audio connection their medical care today? yes  The visit included audio and video interaction. No technical issues occurred during this visit.       Chief Complaint  Diarrhea (3 days)    Subjective     History of Present Illness:  Antony Tatum is a 68 y.o. male who presents today for evaluation of acute diarrhea.    The patient states that his diarrhea began Wednesday morning.  He describes the stools as mushy, occurring predominantly after eating meals, nonbloody, and occurring approximately 3-4 times per day.  Patient denies any abdominal pain, nausea, vomiting, or fevers.  He states that he is only tried Pepto-Bismol at this time and this has been unsuccessful.  The patient denies any environmental exposures; however, he does state that he had a dinner on Monday night consisting of raw tuna, raw oysters, and steak.      Past Medical History:   Past Medical History:   Diagnosis Date    Anxiety     Coronary artery disease     Depression     Diverticulosis     Erosive gastritis     Family hx of colon cancer     H/O CT scan of abdomen 2014    ddd, tics, tortuosity w/aortoiliac atherosclerosis, HH, cardiac enlargement    Heart attack     Hepatitis     HEP C    Hiatal hernia     History of CT scan of abdomen     Hyperlipidemia     Hypertension     Obese     Thoracic aortic aneurysm without rupture        Past Surgical History:   Past Surgical History:   Procedure Laterality Date    CERVICAL DISCECTOMY ANTERIOR      anterior spinal, osteophytectomy cerv interspace    CERVICAL DISCECTOMY ANTERIOR  2015    spinal; C5-C6 and C6-C7 anterior cervical discectomy and arthrodesis; Jon Lua    CHOLECYSTECTOMY      COLONOSCOPY  06/10/2016     himanshu Sterling M.D.    CORONARY ANGIOPLASTY WITH STENT PLACEMENT      CORONARY ANGIOPLASTY WITH STENT PLACEMENT N/A     10yrs ago with Dr Ruff at [facility]; debatable at whether he had an MI that admission    ENDOSCOPY N/A 12/28/2016    Medium sized HH, gastritis.      GALLBLADDER SURGERY      UPPER GASTROINTESTINAL ENDOSCOPY  02/12/2014    LA Grade A reflux esophagitis, HH, erosive gtastritis, bx       Immunizations:   Immunization History   Administered Date(s) Administered    ABRYSVO (RSV, 60+ or pregnant women 32-36 wks) 10/05/2023    COVID-19 (MODERNA) BIVALENT 12+YRS 11/09/2022    COVID-19 (PFIZER) Purple Cap Monovalent 03/17/2021, 04/07/2021, 10/07/2021    COVID-19 (UNSPECIFIED) 04/06/2021, 10/21/2023    COVID-19 F23 (MODERNA) 12YRS+ (SPIKEVAX) 10/21/2023    Covid-19 (Pfizer) Gray Cap Monovalent 04/06/2022    DTaP 01/02/2013    Flu Vaccine Quad PF >36MO 10/02/2017    Fluzone High-Dose 65+YRS 11/05/2019, 09/27/2020, 10/05/2023    Fluzone High-Dose 65+yrs 09/25/2020, 09/27/2020, 10/26/2022    Fluzone Quad >6mos (Multi-dose) 09/01/2021    Hepatitis A 07/26/2018, 02/06/2019    Pneumococcal Conjugate 13-Valent (PCV13) 05/16/2019    Pneumococcal Polysaccharide (PPSV23) 05/15/2020    Shingrix 08/19/2022, 02/01/2023    Tdap 05/30/2023    flucelvax quad pfs =>4 YRS 11/08/2018        Medications:     Current Outpatient Medications:     ALPRAZolam (XANAX) 0.5 MG tablet, Take 1 tablet by mouth 2 (Two) Times a Day As Needed for Anxiety., Disp: 10 tablet, Rfl: 0    aspirin 81 MG tablet, Take by mouth., Disp: , Rfl:     atorvastatin (LIPITOR) 40 MG tablet, Take 1 tablet by mouth Daily., Disp: 90 tablet, Rfl: 0    lisinopril (PRINIVIL,ZESTRIL) 5 MG tablet, Take 1 tablet by mouth Daily., Disp: 90 tablet, Rfl: 1    pantoprazole (PROTONIX) 40 MG EC tablet, Take 1 tablet by mouth 2 (Two) Times a Day., Disp: 180 tablet, Rfl: 0    sildenafil (VIAGRA) 100 MG tablet, Take 1 tablet by mouth Daily As Needed for Erectile  "Dysfunction., Disp: 10 tablet, Rfl: 4    Allergies:   No Known Allergies    Family History:   Family History   Problem Relation Age of Onset    Colon cancer Mother     Diabetes Mother     Hypertension Mother     Breast cancer Sister        Social History:   Social History     Socioeconomic History    Marital status:    Tobacco Use    Smoking status: Never     Passive exposure: Never    Smokeless tobacco: Never   Substance and Sexual Activity    Alcohol use: Yes     Alcohol/week: 2.0 standard drinks of alcohol     Types: 2 Shots of liquor per week     Comment: weekly    Drug use: No    Sexual activity: Defer         Objective     Vital Signs  Ht 172.7 cm (67.99\")   Wt 109 kg (240 lb)   BMI 36.50 kg/m²   Estimated body mass index is 36.5 kg/m² as calculated from the following:    Height as of this encounter: 172.7 cm (67.99\").    Weight as of this encounter: 109 kg (240 lb).          Physical Exam  Constitutional:       General: He is not in acute distress.     Appearance: He is not ill-appearing.   Pulmonary:      Effort: Pulmonary effort is normal. No respiratory distress.   Neurological:      Mental Status: He is alert and oriented to person, place, and time.   Psychiatric:         Mood and Affect: Mood normal.         Behavior: Behavior normal.         Thought Content: Thought content normal.         Judgment: Judgment normal.          Assessment and Plan     Diagnoses and all orders for this visit:    1. Acute diarrhea (Primary)           #Acute Diarrhea  -Likely viral versus bacterial in nature.  The patient denies any blood in his stool, abdominal pain, fevers, or other systemic symptoms at this time.  PLAN  -Patient counseled on supportive care measures for acute diarrhea.  Advised him to drink plenty of fluids with electrolytes added and that he may also use loperamide to help limit the frequency and duration of his diarrhea.  Advised the patient to take no more than 16 mg loperamide daily and " counseled him on proper use of this medication.  -Advised the patient that should diarrhea persist longer than 7 days it would be recommended that he obtain stool studies at that time    Follow Up  Return if symptoms worsen or fail to improve.    MD MONICA [facility]  [address] [place] [id number] [phone]

## 2024-08-21 DIAGNOSIS — E29.1 TESTOSTERONE DEFICIENCY IN MALE: Primary | ICD-10-CM

## 2024-08-21 RX ORDER — SILDENAFIL 100 MG/1
100 TABLET, FILM COATED ORAL DAILY PRN
Qty: 20 TABLET | Refills: 0 | Status: SHIPPED | OUTPATIENT
Start: 2024-08-21

## 2024-09-11 ENCOUNTER — OFFICE VISIT (OUTPATIENT)
Dept: GASTROENTEROLOGY | Facility: CLINIC | Age: 69
End: 2024-09-11
Payer: MEDICARE

## 2024-09-11 VITALS
TEMPERATURE: 97.5 F | SYSTOLIC BLOOD PRESSURE: 98 MMHG | DIASTOLIC BLOOD PRESSURE: 66 MMHG | HEIGHT: 68 IN | HEART RATE: 91 BPM | BODY MASS INDEX: 37.77 KG/M2 | WEIGHT: 249.2 LBS

## 2024-09-11 DIAGNOSIS — K21.9 GERD WITHOUT ESOPHAGITIS: Primary | ICD-10-CM

## 2024-09-11 DIAGNOSIS — Z80.0 FAMILY HISTORY OF COLON CANCER: ICD-10-CM

## 2024-09-11 PROCEDURE — 1159F MED LIST DOCD IN RCRD: CPT | Performed by: INTERNAL MEDICINE

## 2024-09-11 PROCEDURE — 3078F DIAST BP <80 MM HG: CPT | Performed by: INTERNAL MEDICINE

## 2024-09-11 PROCEDURE — 99214 OFFICE O/P EST MOD 30 MIN: CPT | Performed by: INTERNAL MEDICINE

## 2024-09-11 PROCEDURE — 3074F SYST BP LT 130 MM HG: CPT | Performed by: INTERNAL MEDICINE

## 2024-09-11 PROCEDURE — 1160F RVW MEDS BY RX/DR IN RCRD: CPT | Performed by: INTERNAL MEDICINE

## 2024-09-11 NOTE — PROGRESS NOTES
Subjective   Chief Complaint   Patient presents with    Heartburn       Antony Tatum is a  68 y.o. male here for a follow up visit for GERD.     He has been doing well.  He has not had any heartburn.  He reports that he takes his pantoprazole faithfully in the morning and then he will take it before dinner if he knows he is can eat something heavy or beef.  He for the most part does not have any symptoms.  Certain types of foods seem to get him in trouble and he knows that if he overindulges he will have symptoms such as dairy before rich foods.  Bowels have been moving regularly.  No blood in his stool.  No difficulty swallowing or abdominal pain.    Family history of colorectal cancer in his mother-last colonoscopy was in 2021.  He also had an EGD at that time which was fairly unremarkable    Labs from May 2024 reviewed-normal renal function  HPI  Past Medical History:   Diagnosis Date    Anxiety     Cholelithiasis gallbladder removed 2010    Coronary artery disease     Depression     Diverticulosis     Erosive gastritis     Family hx of colon cancer     GERD (gastroesophageal reflux disease)     H/O CT scan of abdomen 01/29/2014    ddd, tics, tortuosity w/aortoiliac atherosclerosis, HH, cardiac enlargement    Heart attack     Hepatitis     HEP C    Hiatal hernia     History of CT scan of abdomen     Hyperlipidemia     Hypertension     Obese     Thoracic aortic aneurysm without rupture      Past Surgical History:   Procedure Laterality Date    CERVICAL DISCECTOMY ANTERIOR      anterior spinal, osteophytectomy cerv interspace    CERVICAL DISCECTOMY ANTERIOR  12/07/2015    spinal; C5-C6 and C6-C7 anterior cervical discectomy and arthrodesis; Jon Lua    CHOLECYSTECTOMY      COLONOSCOPY  06/10/2016    himanshu -Indira MATHIS    CORONARY ANGIOPLASTY WITH STENT PLACEMENT      CORONARY ANGIOPLASTY WITH STENT PLACEMENT N/A     10yrs ago with Dr Ruff at [facility]; debatable at whether he had an MI that admission     ENDOSCOPY N/A 12/28/2016    Medium sized HH, gastritis.      GALLBLADDER SURGERY      UPPER GASTROINTESTINAL ENDOSCOPY  02/12/2014    LA Grade A reflux esophagitis, HH, erosive gtastritis, bx       Current Outpatient Medications:     ALPRAZolam (XANAX) 0.5 MG tablet, Take 1 tablet by mouth 2 (Two) Times a Day As Needed for Anxiety., Disp: 10 tablet, Rfl: 0    aspirin 81 MG tablet, Take by mouth., Disp: , Rfl:     atorvastatin (LIPITOR) 40 MG tablet, Take 1 tablet by mouth Daily., Disp: 90 tablet, Rfl: 0    lisinopril (PRINIVIL,ZESTRIL) 5 MG tablet, Take 1 tablet by mouth Daily., Disp: 90 tablet, Rfl: 1    pantoprazole (PROTONIX) 40 MG EC tablet, Take 1 tablet by mouth 2 (Two) Times a Day., Disp: 180 tablet, Rfl: 0    sildenafil (VIAGRA) 100 MG tablet, Take 1 tablet by mouth Daily As Needed for Erectile Dysfunction., Disp: 20 tablet, Rfl: 0  PRN Meds:.  No Known Allergies  Social History     Socioeconomic History    Marital status:    Tobacco Use    Smoking status: Never     Passive exposure: Never    Smokeless tobacco: Never   Substance and Sexual Activity    Alcohol use: Yes     Alcohol/week: 2.0 standard drinks of alcohol     Types: 2 Shots of liquor per week     Comment: socially    Drug use: No    Sexual activity: Yes     Partners: Female     Comment: too old to worry about it     Family History   Problem Relation Age of Onset    Colon cancer Mother     Diabetes Mother     Hypertension Mother     Breast cancer Sister      Review of Systems   Constitutional:  Negative for appetite change and unexpected weight change.   HENT:  Negative for trouble swallowing.    Gastrointestinal:  Negative for abdominal pain, blood in stool, constipation, diarrhea and nausea.     Vitals:    09/11/24 1446   BP: 98/66   Pulse: 91   Temp: 97.5 °F (36.4 °C)         09/11/24  1446   Weight: 113 kg (249 lb 3.2 oz)       Objective   Physical Exam  Constitutional:       Appearance: Normal appearance. He is well-developed.  "  HENT:      Head: Normocephalic and atraumatic.   Eyes:      General: No scleral icterus.     Conjunctiva/sclera: Conjunctivae normal.   Pulmonary:      Effort: Pulmonary effort is normal.      Breath sounds: Normal breath sounds.   Abdominal:      General: There is no distension.      Palpations: Abdomen is soft.   Musculoskeletal:      Cervical back: Normal range of motion and neck supple.   Skin:     General: Skin is warm and dry.   Neurological:      Mental Status: He is alert.   Psychiatric:         Mood and Affect: Mood normal.         Behavior: Behavior normal.       Lab Results   Component Value Date    WBC 10.60 05/30/2024    HGB 14.1 05/30/2024    HCT 43.9 05/30/2024    MCV 91.6 05/30/2024     05/30/2024     Lab Results   Component Value Date    GLUCOSE 96 05/30/2024    BUN 20 05/30/2024    CREATININE 0.80 05/30/2024    EGFRRESULT 95.2 05/30/2024    BCR 25.0 05/30/2024    K 4.4 05/30/2024    CO2 26.3 05/30/2024    CALCIUM 9.1 05/30/2024    PROTENTOTREF 6.8 05/30/2024    ALBUMIN 4.6 05/30/2024    BILITOT 0.7 05/30/2024    AST 19 05/30/2024    ALT 19 05/30/2024     No results found for: \"CNLD382\"   No results found for: \"CALPROFECAL\"     No radiology results for the last 90 days.     Assessment & Plan   Diagnoses and all orders for this visit:    GERD without esophagitis    Family history of colon cancer      Plan:  GERD is stable on pantoprazole daily-he takes it twice daily as needed.  Currently with good control and no alarm symptoms.  Recommend diet and lifestyle modification to reduce acid reflux symptoms such as eating small meals, reducing fat caffeine and alcohol in the diet, avoid eating close to bedtime, eliminate excess weight if applicable.  Recommend repeat colonoscopy in 2026 for screening purposes given his family history     "

## 2024-09-17 DIAGNOSIS — R91.1 RIGHT UPPER LOBE PULMONARY NODULE: Primary | ICD-10-CM

## 2024-10-11 ENCOUNTER — LAB (OUTPATIENT)
Dept: FAMILY MEDICINE CLINIC | Facility: CLINIC | Age: 69
End: 2024-10-11
Payer: MEDICARE

## 2024-10-11 DIAGNOSIS — E78.5 HYPERLIPIDEMIA, UNSPECIFIED HYPERLIPIDEMIA TYPE: Primary | ICD-10-CM

## 2024-10-11 DIAGNOSIS — Z51.81 ENCOUNTER FOR THERAPEUTIC DRUG MONITORING: ICD-10-CM

## 2024-10-11 DIAGNOSIS — R73.9 HYPERGLYCEMIA: ICD-10-CM

## 2024-10-12 LAB
ALBUMIN SERPL-MCNC: 4.6 G/DL (ref 3.5–5.2)
ALBUMIN/GLOB SERPL: 2.1 G/DL
ALP SERPL-CCNC: 137 U/L (ref 39–117)
ALT SERPL-CCNC: 19 U/L (ref 1–41)
AST SERPL-CCNC: 22 U/L (ref 1–40)
BASOPHILS # BLD AUTO: 0.06 10*3/MM3 (ref 0–0.2)
BASOPHILS NFR BLD AUTO: 0.7 % (ref 0–1.5)
BILIRUB SERPL-MCNC: 1.1 MG/DL (ref 0–1.2)
BUN SERPL-MCNC: 12 MG/DL (ref 8–23)
BUN/CREAT SERPL: 15.8 (ref 7–25)
CALCIUM SERPL-MCNC: 9.4 MG/DL (ref 8.6–10.5)
CHLORIDE SERPL-SCNC: 101 MMOL/L (ref 98–107)
CHOLEST SERPL-MCNC: 133 MG/DL (ref 0–200)
CO2 SERPL-SCNC: 25.7 MMOL/L (ref 22–29)
CREAT SERPL-MCNC: 0.76 MG/DL (ref 0.76–1.27)
EGFRCR SERPLBLD CKD-EPI 2021: 96.7 ML/MIN/1.73
EOSINOPHIL # BLD AUTO: 0.42 10*3/MM3 (ref 0–0.4)
EOSINOPHIL NFR BLD AUTO: 4.6 % (ref 0.3–6.2)
ERYTHROCYTE [DISTWIDTH] IN BLOOD BY AUTOMATED COUNT: 13 % (ref 12.3–15.4)
GLOBULIN SER CALC-MCNC: 2.2 GM/DL
GLUCOSE SERPL-MCNC: 89 MG/DL (ref 65–99)
HBA1C MFR BLD: 5.4 % (ref 4.8–5.6)
HCT VFR BLD AUTO: 43 % (ref 37.5–51)
HDLC SERPL-MCNC: 50 MG/DL (ref 40–60)
HGB BLD-MCNC: 14.6 G/DL (ref 13–17.7)
IMM GRANULOCYTES # BLD AUTO: 0.02 10*3/MM3 (ref 0–0.05)
IMM GRANULOCYTES NFR BLD AUTO: 0.2 % (ref 0–0.5)
LDLC SERPL CALC-MCNC: 63 MG/DL (ref 0–100)
LDLC/HDLC SERPL: 1.22 {RATIO}
LYMPHOCYTES # BLD AUTO: 1.7 10*3/MM3 (ref 0.7–3.1)
LYMPHOCYTES NFR BLD AUTO: 18.6 % (ref 19.6–45.3)
MCH RBC QN AUTO: 31.3 PG (ref 26.6–33)
MCHC RBC AUTO-ENTMCNC: 34 G/DL (ref 31.5–35.7)
MCV RBC AUTO: 92.1 FL (ref 79–97)
MONOCYTES # BLD AUTO: 0.9 10*3/MM3 (ref 0.1–0.9)
MONOCYTES NFR BLD AUTO: 9.8 % (ref 5–12)
NEUTROPHILS # BLD AUTO: 6.06 10*3/MM3 (ref 1.7–7)
NEUTROPHILS NFR BLD AUTO: 66.1 % (ref 42.7–76)
NRBC BLD AUTO-RTO: 0 /100 WBC (ref 0–0.2)
PLATELET # BLD AUTO: 270 10*3/MM3 (ref 140–450)
POTASSIUM SERPL-SCNC: 4.5 MMOL/L (ref 3.5–5.2)
PROT SERPL-MCNC: 6.8 G/DL (ref 6–8.5)
RBC # BLD AUTO: 4.67 10*6/MM3 (ref 4.14–5.8)
SODIUM SERPL-SCNC: 140 MMOL/L (ref 136–145)
TRIGL SERPL-MCNC: 109 MG/DL (ref 0–150)
UNABLE TO VOID: NORMAL
VLDLC SERPL CALC-MCNC: 20 MG/DL (ref 5–40)
WBC # BLD AUTO: 9.16 10*3/MM3 (ref 3.4–10.8)

## 2024-10-14 ENCOUNTER — HOSPITAL ENCOUNTER (OUTPATIENT)
Dept: CT IMAGING | Facility: HOSPITAL | Age: 69
Discharge: HOME | End: 2024-10-14
Payer: MEDICARE

## 2024-10-14 ENCOUNTER — LAB (OUTPATIENT)
Dept: LAB | Facility: HOSPITAL | Age: 69
End: 2024-10-14
Payer: MEDICARE

## 2024-10-14 DIAGNOSIS — R91.1 RIGHT UPPER LOBE PULMONARY NODULE: ICD-10-CM

## 2024-10-14 LAB
AMPHET+METHAMPHET UR QL: NEGATIVE
BARBITURATES UR QL SCN: NEGATIVE
BENZODIAZ UR QL SCN: NEGATIVE
BILIRUB UR QL STRIP: NEGATIVE
CANNABINOIDS SERPL QL: NEGATIVE
CLARITY UR: CLEAR
COCAINE UR QL: NEGATIVE
COLOR UR: YELLOW
FENTANYL UR-MCNC: NEGATIVE NG/ML
GLUCOSE UR STRIP-MCNC: NEGATIVE MG/DL
HGB UR QL STRIP.AUTO: NEGATIVE
KETONES UR QL STRIP: NEGATIVE
LEUKOCYTE ESTERASE UR QL STRIP.AUTO: NEGATIVE
METHADONE UR QL SCN: NEGATIVE
NITRITE UR QL STRIP: NEGATIVE
OPIATES UR QL: NEGATIVE
OXYCODONE UR QL SCN: NEGATIVE
PH UR STRIP.AUTO: 6 [PH] (ref 5–8)
PROT UR QL STRIP: NEGATIVE
SP GR UR STRIP: 1.02 (ref 1–1.03)
UROBILINOGEN UR QL STRIP: NORMAL

## 2024-10-14 PROCEDURE — 80307 DRUG TEST PRSMV CHEM ANLYZR: CPT | Performed by: INTERNAL MEDICINE

## 2024-10-14 PROCEDURE — 25510000001 IOPAMIDOL 61 % SOLUTION: Performed by: INTERNAL MEDICINE

## 2024-10-14 PROCEDURE — 81003 URINALYSIS AUTO W/O SCOPE: CPT | Performed by: INTERNAL MEDICINE

## 2024-10-14 PROCEDURE — 71260 CT THORAX DX C+: CPT

## 2024-10-14 RX ORDER — IOPAMIDOL 612 MG/ML
100 INJECTION, SOLUTION INTRAVASCULAR
Status: COMPLETED | OUTPATIENT
Start: 2024-10-14 | End: 2024-10-14

## 2024-10-14 RX ADMIN — IOPAMIDOL 75 ML: 612 INJECTION, SOLUTION INTRAVENOUS at 08:05

## 2024-10-31 ENCOUNTER — OFFICE VISIT (OUTPATIENT)
Dept: FAMILY MEDICINE CLINIC | Facility: CLINIC | Age: 69
End: 2024-10-31
Payer: MEDICARE

## 2024-10-31 VITALS
SYSTOLIC BLOOD PRESSURE: 140 MMHG | DIASTOLIC BLOOD PRESSURE: 80 MMHG | RESPIRATION RATE: 16 BRPM | BODY MASS INDEX: 37.66 KG/M2 | HEIGHT: 68 IN | TEMPERATURE: 96 F | HEART RATE: 56 BPM | WEIGHT: 248.5 LBS | OXYGEN SATURATION: 94 %

## 2024-10-31 DIAGNOSIS — Z00.00 MEDICARE ANNUAL WELLNESS VISIT, SUBSEQUENT: ICD-10-CM

## 2024-10-31 DIAGNOSIS — I10 PRIMARY HYPERTENSION: Primary | ICD-10-CM

## 2024-10-31 DIAGNOSIS — E66.812 CLASS 2 OBESITY DUE TO EXCESS CALORIES WITHOUT SERIOUS COMORBIDITY WITH BODY MASS INDEX (BMI) OF 39.0 TO 39.9 IN ADULT: ICD-10-CM

## 2024-10-31 DIAGNOSIS — E66.09 CLASS 2 OBESITY DUE TO EXCESS CALORIES WITHOUT SERIOUS COMORBIDITY WITH BODY MASS INDEX (BMI) OF 39.0 TO 39.9 IN ADULT: ICD-10-CM

## 2024-10-31 DIAGNOSIS — E78.5 HYPERLIPIDEMIA, UNSPECIFIED HYPERLIPIDEMIA TYPE: ICD-10-CM

## 2024-10-31 NOTE — PROGRESS NOTES
Subjective   The ABCs of the Annual Wellness Visit  Medicare Wellness Visit      Antony Tatmu is a 68 y.o. patient who presents for a Medicare Wellness Visit.    The following portions of the patient's history were reviewed and   updated as appropriate: allergies, current medications, past family history, past medical history, past social history, past surgical history, and problem list.    Compared to one year ago, the patient's physical   health is the same.  Compared to one year ago, the patient's mental   health is the same.    Recent Hospitalizations:  He was not admitted to the hospital during the last year.     Current Medical Providers:  Patient Care Team:  Jose A Falcon MD as PCP - General (Internal Medicine)    Outpatient Medications Prior to Visit   Medication Sig Dispense Refill    aspirin 81 MG tablet Take by mouth.      atorvastatin (LIPITOR) 40 MG tablet Take 1 tablet by mouth Daily. 90 tablet 0    lisinopril (PRINIVIL,ZESTRIL) 5 MG tablet Take 1 tablet by mouth Daily. 90 tablet 1    pantoprazole (PROTONIX) 40 MG EC tablet Take 1 tablet by mouth 2 (Two) Times a Day. 180 tablet 0    sildenafil (VIAGRA) 100 MG tablet Take 1 tablet by mouth Daily As Needed for Erectile Dysfunction. 20 tablet 0    ALPRAZolam (XANAX) 0.5 MG tablet Take 1 tablet by mouth 2 (Two) Times a Day As Needed for Anxiety. 10 tablet 0     No facility-administered medications prior to visit.     No opioid medication identified on active medication list. I have reviewed chart for other potential  high risk medication/s and harmful drug interactions in the elderly.      Aspirin is on active medication list. Aspirin use is indicated based on review of current medical condition/s. Pros and cons of this therapy have been discussed today. Benefits of this medication outweigh potential harm.  Patient has been encouraged to continue taking this medication.  .      Patient Active Problem List   Diagnosis    Carpal tunnel syndrome     "Displacement of intervertebral disc of cervical region    Cervical radiculopathy    Spinal stenosis of cervical region    Chronic neck pain    Degeneration of intervertebral disc of cervical region    Hyperlipidemia    Hypertension    Obese    Coronary artery disease    GERD (gastroesophageal reflux disease)    Testosterone deficiency in male    Family history of colon cancer    Breast tenderness in male    Dermatitis    Aneurysm of thoracic aorta    Chronic right shoulder pain    Skin lesion    Other insomnia    Hyperglycemia    Epigastric pain    Gastroesophageal reflux disease without esophagitis    History of acute gastritis    Screening PSA (prostate specific antigen)    History of total knee arthroplasty, left    Acute bacterial bronchitis    Acute diarrhea    Medicare annual wellness visit, subsequent     Advance Care Planning Advance Directive is not on file.  ACP discussion was held with the patient during this visit. Patient has an advance directive (not in EMR), copy requested.            Objective   Vitals:    10/31/24 0953   BP: 140/80   BP Location: Right arm   Patient Position: Sitting   Cuff Size: Large Adult   Pulse: 56   Resp: 16   Temp: 96 °F (35.6 °C)   TempSrc: Temporal   SpO2: 94%   Weight: 113 kg (248 lb 8 oz)   Height: 172.7 cm (67.99\")       Estimated body mass index is 37.79 kg/m² as calculated from the following:    Height as of this encounter: 172.7 cm (67.99\").    Weight as of this encounter: 113 kg (248 lb 8 oz).    Class 2 Severe Obesity (BMI >=35 and <=39.9). Obesity-related health conditions include the following: hypertension and dyslipidemias. Obesity is improving with lifestyle modifications. BMI is is above average; BMI management plan is completed. We discussed portion control and increasing exercise.       Does the patient have evidence of cognitive impairment? No  Lab Results   Component Value Date    CHLPL 133 10/11/2024    TRIG 109 10/11/2024    HDL 50 10/11/2024    LDL 63 " 10/11/2024    VLDL 20 10/11/2024    HGBA1C 5.40 10/11/2024                                                                                                Health  Risk Assessment    Smoking Status:  Social History     Tobacco Use   Smoking Status Never    Passive exposure: Never   Smokeless Tobacco Never     Alcohol Consumption:  Social History     Substance and Sexual Activity   Alcohol Use Yes    Alcohol/week: 2.0 standard drinks of alcohol    Types: 2 Shots of liquor per week    Comment: socially       Fall Risk Screen  STEADI Fall Risk Assessment was completed, and patient is at LOW risk for falls.Assessment completed on:10/31/2024    Depression Screening:      10/31/2024     9:00 AM   PHQ-2/PHQ-9 Depression Screening   Little interest or pleasure in doing things Not at all   Feeling down, depressed, or hopeless Not at all     Health Habits and Functional and Cognitive Screening:      10/31/2024     9:00 AM   Functional & Cognitive Status   Do you have difficulty preparing food and eating? No   Do you have difficulty bathing yourself, getting dressed or grooming yourself? No   Do you have difficulty using the toilet? No   Do you have difficulty moving around from place to place? No   Do you have trouble with steps or getting out of a bed or a chair? No   Current Diet Well Balanced Diet   Dental Exam Unknown   Eye Exam Unknown   Exercise (times per week) 0 times per week   Current Exercises Include No Regular Exercise   Do you need help using the phone?  No   Are you deaf or do you have serious difficulty hearing?  No   Do you need help to go to places out of walking distance? No   Do you need help shopping? No   Do you need help preparing meals?  No   Do you need help with housework?  No   Do you need help with laundry? No   Do you need help taking your medications? No   Do you need help managing money? No   Do you ever drive or ride in a car without wearing a seat belt? No   Have you felt unusual stress, anger  or loneliness in the last month? No   Who do you live with? Spouse   If you need help, do you have trouble finding someone available to you? No   Have you been bothered in the last four weeks by sexual problems? No   Do you have difficulty concentrating, remembering or making decisions? No           Age-appropriate Screening Schedule:  Refer to the list below for future screening recommendations based on patient's age, sex and/or medical conditions. Orders for these recommended tests are listed in the plan section. The patient has been provided with a written plan.    Health Maintenance List  Health Maintenance   Topic Date Due    COVID-19 Vaccine (8 - 2023-24 season) 01/09/2025    LIPID PANEL  10/11/2025    ANNUAL WELLNESS VISIT  10/31/2025    BMI FOLLOWUP  10/31/2025    COLORECTAL CANCER SCREENING  05/13/2031    TDAP/TD VACCINES (2 - Td or Tdap) 05/30/2033    HEPATITIS C SCREENING  Completed    INFLUENZA VACCINE  Completed    Pneumococcal Vaccine 65+  Completed    ZOSTER VACCINE  Completed                                                                                                                                                CMS Preventative Services Quick Reference  Risk Factors Identified During Encounter  None Identified    The above risks/problems have been discussed with the patient.  Pertinent information has been shared with the patient in the After Visit Summary.  An After Visit Summary and PPPS were made available to the patient.    Follow Up:   Next Medicare Wellness visit to be scheduled in 1 year.         Additional E&M Note during same encounter follows:  Patient has additional, significant, and separately identifiable condition(s)/problem(s) that require work above and beyond the Medicare Wellness Visit     Chief Complaint  Medicare Wellness-subsequent    Subjective    He is here today for a Medicare subsequent wellness visit, follow-up on labs.    He has made an effort to lose weight.  He has  "had significant success.             The patient is a 62-year-old male who presents for a wellness visit.    He reports feeling physically and emotionally stable, with no hospitalizations in the past year. His current medication regimen includes aspirin 81 mg.    He has experienced a weight loss of 24 pounds since April 2024, which he attributes to dietary changes and increased physical activity, including yard work and car maintenance.    Despite his weight loss, he continues to experience leg, back, and shoulder pain, which disrupts his sleep. He has found some relief from his leg pain by consuming a spoonful of mustard.    He also mentions that his blood pressure was notably low during his last visit.    SOCIAL HISTORY  He does not smoke. He drinks less than 2 alcoholic beverages a week.    IMMUNIZATIONS  He is up to date on his influenza and RSV vaccines.  Review of Systems   Constitutional: Negative.    HENT: Negative.     Respiratory: Negative.     Cardiovascular: Negative.    Musculoskeletal: Negative.    Psychiatric/Behavioral: Negative.            Objective   Vital Signs:  /80 (BP Location: Right arm, Patient Position: Sitting, Cuff Size: Large Adult)   Pulse 56   Temp 96 °F (35.6 °C) (Temporal)   Resp 16   Ht 172.7 cm (67.99\")   Wt 113 kg (248 lb 8 oz)   SpO2 94%   BMI 37.79 kg/m²   Physical Exam  Vitals and nursing note reviewed.   Constitutional:       General: He is not in acute distress.     Appearance: Normal appearance. He is obese. He is not ill-appearing, toxic-appearing or diaphoretic.      Comments: Pleasant, neatly groomed, no distress.  BMI 37.7.   Neck:      Vascular: No carotid bruit.   Cardiovascular:      Rate and Rhythm: Regular rhythm.      Heart sounds: Normal heart sounds. No murmur heard.     No gallop.   Pulmonary:      Effort: No respiratory distress.      Breath sounds: Normal breath sounds. No wheezing or rales.   Neurological:      Mental Status: He is alert and " oriented to person, place, and time.   Psychiatric:         Mood and Affect: Mood normal.         Behavior: Behavior normal.         Thought Content: Thought content normal.           Lungs are clear.  Heart sounds are normal. No murmurs.    Vital Signs  Blood pressure is 120/80.             Assessment and Plan           1. Wellness Visit.  His comprehensive metabolic panel is within normal limits, with a slight elevation in alkaline phosphatase (117). His hemoglobin A1c and blood sugar levels are normal. He has achieved a weight loss of 24 pounds since April 2024. He was advised to continue his current regimen of aspirin 81 mg. He was encouraged to maintain his weight loss efforts through diet and exercise.    2. Stable Lung Nodules.  A chest CT scan shows a stable, mainly calcified nodule in the posterior right upper lobe and a stable 6 mm left lower lobe nodule. These findings are benign and do not have the potential to turn malignant. No further intervention is required at this time.    3. Hypertension.  His blood pressure was slightly elevated initially but was 120/80 mmHg upon recheck. He was advised to monitor his blood pressure regularly and maintain a healthy lifestyle.      No orders of the defined types were placed in this encounter.            Follow Up   No follow-ups on file.  Patient was given instructions and counseling regarding his condition or for health maintenance advice. Please see specific information pulled into the AVS if appropriate.  Patient or patient representative verbalized consent for the use of Ambient Listening during the visit with  Jose A Falcon MD for chart documentation. 11/1/2024  10:28 EDT

## 2024-11-01 PROBLEM — Z00.00 MEDICARE ANNUAL WELLNESS VISIT, SUBSEQUENT: Status: ACTIVE | Noted: 2024-11-01

## 2024-11-11 RX ORDER — SUCRALFATE 1 G/1
1 TABLET ORAL 4 TIMES DAILY PRN
Qty: 30 TABLET | Refills: 3 | Status: SHIPPED | OUTPATIENT
Start: 2024-11-11

## 2024-11-25 RX ORDER — ATORVASTATIN CALCIUM 40 MG/1
40 TABLET, FILM COATED ORAL DAILY
Qty: 90 TABLET | Refills: 0 | Status: SHIPPED | OUTPATIENT
Start: 2024-11-25

## 2024-12-17 DIAGNOSIS — K21.9 GASTROESOPHAGEAL REFLUX DISEASE WITHOUT ESOPHAGITIS: ICD-10-CM

## 2024-12-17 RX ORDER — PANTOPRAZOLE SODIUM 40 MG/1
40 TABLET, DELAYED RELEASE ORAL 2 TIMES DAILY
Qty: 180 TABLET | Refills: 3 | Status: SHIPPED | OUTPATIENT
Start: 2024-12-17

## 2025-01-03 ENCOUNTER — LAB (OUTPATIENT)
Dept: LAB | Facility: HOSPITAL | Age: 70
End: 2025-01-03
Payer: MEDICARE

## 2025-01-03 ENCOUNTER — TRANSCRIBE ORDERS (OUTPATIENT)
Dept: LAB | Facility: HOSPITAL | Age: 70
End: 2025-01-03
Payer: MEDICARE

## 2025-01-03 DIAGNOSIS — Z01.811 PRE-OP CHEST EXAM: Primary | ICD-10-CM

## 2025-01-03 DIAGNOSIS — Z01.811 PRE-OP CHEST EXAM: ICD-10-CM

## 2025-01-03 LAB
ANION GAP SERPL CALCULATED.3IONS-SCNC: 6 MMOL/L (ref 5–15)
BUN SERPL-MCNC: 24 MG/DL (ref 8–23)
BUN/CREAT SERPL: 32.9 (ref 7–25)
CALCIUM SPEC-SCNC: 9.3 MG/DL (ref 8.6–10.5)
CHLORIDE SERPL-SCNC: 105 MMOL/L (ref 98–107)
CO2 SERPL-SCNC: 30 MMOL/L (ref 22–29)
CREAT SERPL-MCNC: 0.73 MG/DL (ref 0.76–1.27)
EGFRCR SERPLBLD CKD-EPI 2021: 97.3 ML/MIN/1.73
GLUCOSE SERPL-MCNC: 103 MG/DL (ref 65–99)
POTASSIUM SERPL-SCNC: 4.4 MMOL/L (ref 3.5–5.2)
SODIUM SERPL-SCNC: 141 MMOL/L (ref 136–145)

## 2025-01-03 PROCEDURE — 36415 COLL VENOUS BLD VENIPUNCTURE: CPT

## 2025-01-03 PROCEDURE — 80048 BASIC METABOLIC PNL TOTAL CA: CPT

## 2025-01-08 RX ORDER — LISINOPRIL 5 MG/1
5 TABLET ORAL DAILY
Qty: 90 TABLET | Refills: 1 | Status: SHIPPED | OUTPATIENT
Start: 2025-01-08

## 2025-01-16 ENCOUNTER — OFFICE VISIT (OUTPATIENT)
Dept: FAMILY MEDICINE CLINIC | Facility: CLINIC | Age: 70
End: 2025-01-16
Payer: MEDICARE

## 2025-01-16 VITALS
WEIGHT: 248.9 LBS | RESPIRATION RATE: 16 BRPM | HEART RATE: 81 BPM | TEMPERATURE: 96.8 F | OXYGEN SATURATION: 93 % | SYSTOLIC BLOOD PRESSURE: 122 MMHG | HEIGHT: 68 IN | BODY MASS INDEX: 37.72 KG/M2 | DIASTOLIC BLOOD PRESSURE: 80 MMHG

## 2025-01-16 DIAGNOSIS — M79.651 RIGHT THIGH PAIN: Primary | ICD-10-CM

## 2025-01-16 PROCEDURE — 99213 OFFICE O/P EST LOW 20 MIN: CPT | Performed by: INTERNAL MEDICINE

## 2025-01-16 PROCEDURE — G2211 COMPLEX E/M VISIT ADD ON: HCPCS | Performed by: INTERNAL MEDICINE

## 2025-01-16 PROCEDURE — 1126F AMNT PAIN NOTED NONE PRSNT: CPT | Performed by: INTERNAL MEDICINE

## 2025-01-16 PROCEDURE — 3074F SYST BP LT 130 MM HG: CPT | Performed by: INTERNAL MEDICINE

## 2025-01-16 PROCEDURE — 3079F DIAST BP 80-89 MM HG: CPT | Performed by: INTERNAL MEDICINE

## 2025-01-16 RX ORDER — NITROGLYCERIN 0.4 MG/1
0.4 TABLET SUBLINGUAL
COMMUNITY
Start: 2024-11-18 | End: 2025-11-18

## 2025-01-16 NOTE — PROGRESS NOTES
Answers submitted by the patient for this visit:  Primary Reason for Visit (Submitted on 1/14/2025)  What is the primary reason for your visit?: Problem Not Listed  Subjective   Antony Tatum is a 69 y.o. male. Patient is here today for   Chief Complaint   Patient presents with    Leg Pain     Right leg x 1 month;denies injury        Leg Pain   Pertinent negatives include no numbness.     History of Present Illness  The patient presents for evaluation of right thigh pain.    He has been experiencing persistent pain in his right thigh muscle for the past month, which he attributes to carrying heavy firewood on a two-mandujano. The onset of the pain was sudden, occurring one morning without any specific precipitating activity the previous day. He reports that the pain is constant, although it has slightly improved compared to 3 weeks ago. He rates the pain as 10 out of 10 at its worst. Despite the pain, he is not limping and retains the ability to elevate his leg. However, he experiences discomfort when attempting to cross his legs due to back pain. He has a history of sports participation and is familiar with muscle aches and pulls, describing the current pain as similar. The pain does not interfere with his daily activities, but it is a source of constant soreness. He has reduced his gym activities, which previously included biking, leg presses, and curls, to focus solely on biking. He has been managing the pain with Extra Strength Tylenol and occasionally Aleve, which provide some relief.    MEDICATIONS  Extra strength Tylenol, Aleve      Vitals:    01/16/25 1100   BP: 122/80   Pulse: 81   Resp: 16   Temp: 96.8 °F (36 °C)   SpO2: 93%     Body mass index is 37.85 kg/m².    Past Medical History:   Diagnosis Date    Anxiety     Cholelithiasis gallbladder removed 2010    Coronary artery disease     Depression     Diverticulosis     Erosive gastritis     Family hx of colon cancer     GERD (gastroesophageal reflux  disease)     H/O CT scan of abdomen 01/29/2014    ddd, tics, tortuosity w/aortoiliac atherosclerosis, HH, cardiac enlargement    Heart attack     Hepatitis     HEP C    Hiatal hernia     History of CT scan of abdomen     Hyperlipidemia     Hypertension     Obese     Thoracic aortic aneurysm without rupture       No Known Allergies   Social History     Socioeconomic History    Marital status:    Tobacco Use    Smoking status: Never     Passive exposure: Never    Smokeless tobacco: Never   Vaping Use    Vaping status: Never Used   Substance and Sexual Activity    Alcohol use: Yes     Alcohol/week: 2.0 standard drinks of alcohol     Types: 2 Shots of liquor per week     Comment: socially    Drug use: No    Sexual activity: Yes     Partners: Female     Comment: too old to worry about it        Current Outpatient Medications:     ALPRAZolam (XANAX) 0.5 MG tablet, Take 1 tablet by mouth 2 (Two) Times a Day As Needed for Anxiety., Disp: 10 tablet, Rfl: 0    aspirin 81 MG tablet, Take by mouth., Disp: , Rfl:     atorvastatin (LIPITOR) 40 MG tablet, Take 1 tablet by mouth Daily., Disp: 90 tablet, Rfl: 0    lisinopril (PRINIVIL,ZESTRIL) 5 MG tablet, Take 1 tablet by mouth Daily., Disp: 90 tablet, Rfl: 1    nitroglycerin (NITROSTAT) 0.4 MG SL tablet, Place 1 tablet under the tongue., Disp: , Rfl:     pantoprazole (PROTONIX) 40 MG EC tablet, Take 1 tablet by mouth 2 (Two) Times a Day., Disp: 180 tablet, Rfl: 3    sucralfate (Carafate) 1 g tablet, Take 1 tablet by mouth 4 (Four) Times a Day As Needed (upper abdominal pain)., Disp: 30 tablet, Rfl: 3    sildenafil (VIAGRA) 100 MG tablet, Take 1 tablet by mouth Daily As Needed for Erectile Dysfunction. (Patient not taking: Reported on 1/16/2025), Disp: 20 tablet, Rfl: 0     Objective     Review of Systems   Constitutional:  Negative for chills, diaphoresis and fatigue.   HENT:  Negative for congestion.    Respiratory:  Negative for cough.    Cardiovascular:  Negative for  chest pain.   Gastrointestinal:  Negative for abdominal pain, nausea and vomiting.   Genitourinary:  Negative for dysuria.   Musculoskeletal:  Positive for myalgias. Negative for neck pain.        He has medial right thigh pain.  He feels that this is improving gradually over the last month.   Skin:  Negative for rash.   Neurological:  Negative for numbness and headaches.       Physical Exam  Vitals and nursing note reviewed.   Constitutional:       Appearance: Normal appearance.      Comments: Pleasant, pneumogram, no distress.   Cardiovascular:      Rate and Rhythm: Regular rhythm.   Musculoskeletal:      Comments: Appearance of his right thigh is unremarkable.  He has no erythema, there is no induration, there is no palpable mass.  There is no pain on palpation.   Neurological:      Mental Status: He is alert and oriented to person, place, and time.   Psychiatric:         Mood and Affect: Mood normal.         Behavior: Behavior normal.         Thought Content: Thought content normal.     Answers submitted by the patient for this visit:  Primary Reason for Visit (Submitted on 1/14/2025)  What is the primary reason for your visit?: Problem Not Listed    Physical Exam  There is no hematoma, redness, or swelling in the musculoskeletal system. Tenderness is present upon palpation.    Results      Assessment & Plan    Problems Addressed this Visit          Musculoskeletal and Injuries    Right thigh pain - Primary     Diagnoses         Codes Comments    Right thigh pain    -  Primary ICD-10-CM: M79.651  ICD-9-CM: 729.5           Assessment & Plan  1. Right thigh pain.  The patient's condition appears to be improving gradually over time. He reports that the pain is localized to the muscle and not affecting the bone, hip, or knee. There is no redness or swelling observed. He has been managing the pain with Extra Strength Tylenol and Aleve, which provides some relief. He is advised to continue monitoring his symptoms and  to avoid activities that exacerbate the pain, such as leg presses at the gym. An x-ray of the leg is not deemed necessary at this point. If there is no improvement in his condition within a month, he should inform the clinic.    Follow-up  The patient will follow up in 3 months.      No follow-ups on file.    Patient or patient representative verbalized consent for the use of Ambient Listening during the visit with  Jose A Falcon MD for chart documentation. 1/16/2025  16:32 EST

## 2025-02-28 RX ORDER — ATORVASTATIN CALCIUM 40 MG/1
40 TABLET, FILM COATED ORAL DAILY
Qty: 90 TABLET | Refills: 0 | Status: SHIPPED | OUTPATIENT
Start: 2025-02-28

## 2025-03-11 ENCOUNTER — OFFICE VISIT (OUTPATIENT)
Dept: FAMILY MEDICINE CLINIC | Facility: CLINIC | Age: 70
End: 2025-03-11
Payer: MEDICARE

## 2025-03-11 VITALS
HEIGHT: 68 IN | HEART RATE: 65 BPM | SYSTOLIC BLOOD PRESSURE: 140 MMHG | DIASTOLIC BLOOD PRESSURE: 80 MMHG | BODY MASS INDEX: 37.63 KG/M2 | RESPIRATION RATE: 16 BRPM | TEMPERATURE: 96.6 F | WEIGHT: 248.3 LBS | OXYGEN SATURATION: 93 %

## 2025-03-11 DIAGNOSIS — G89.29 CHRONIC LOW BACK PAIN WITH BILATERAL SCIATICA, UNSPECIFIED BACK PAIN LATERALITY: Primary | ICD-10-CM

## 2025-03-11 DIAGNOSIS — M54.42 CHRONIC LOW BACK PAIN WITH BILATERAL SCIATICA, UNSPECIFIED BACK PAIN LATERALITY: Primary | ICD-10-CM

## 2025-03-11 DIAGNOSIS — M54.41 CHRONIC LOW BACK PAIN WITH BILATERAL SCIATICA, UNSPECIFIED BACK PAIN LATERALITY: Primary | ICD-10-CM

## 2025-03-11 PROCEDURE — 99213 OFFICE O/P EST LOW 20 MIN: CPT | Performed by: STUDENT IN AN ORGANIZED HEALTH CARE EDUCATION/TRAINING PROGRAM

## 2025-03-11 PROCEDURE — 1125F AMNT PAIN NOTED PAIN PRSNT: CPT | Performed by: STUDENT IN AN ORGANIZED HEALTH CARE EDUCATION/TRAINING PROGRAM

## 2025-03-11 PROCEDURE — 3079F DIAST BP 80-89 MM HG: CPT | Performed by: STUDENT IN AN ORGANIZED HEALTH CARE EDUCATION/TRAINING PROGRAM

## 2025-03-11 PROCEDURE — 3077F SYST BP >= 140 MM HG: CPT | Performed by: STUDENT IN AN ORGANIZED HEALTH CARE EDUCATION/TRAINING PROGRAM

## 2025-03-11 RX ORDER — PREDNISONE 20 MG/1
TABLET ORAL
Qty: 28 TABLET | Refills: 0 | Status: SHIPPED | OUTPATIENT
Start: 2025-03-11 | End: 2025-03-24

## 2025-03-11 NOTE — PROGRESS NOTES
Office Note     Name: Antony Tatum    : 1955     MRN: [id number]     Chief Complaint  Back Pain (X 2 weeks)    Subjective     History of Present Illness:  Antony Tatum is a 69 y.o. male     History of Present Illness  The patient is a 69-year-old male who presents today with concerns for back pain.    Back Pain  - Intermittent lumbar issues since high school years  - Approximately 3 weeks ago, tripped while carrying garbage, felt a twisting sensation in his back  - Extremely difficult to get out of bed the following morning  - Periodic, spasmodic back pain with electric shock-like sensations radiating down both legs  - No numbness around the anal or testicular areas and no incontinence  - Difficulty using the bathroom due to back pain  - Pain is dull in the morning and worsens throughout the day, left side more affected than right  - Bending over alleviates pain  - Managed pain with heat, hot showers, lying prone, Aleve, Tylenol, and hydrocodone  - Gómez, a physical therapist, recommended consulting for a steroid dose pack    Supplemental information: He is currently on atorvastatin, pantoprazole for GERD, lisinopril, and baby aspirin. He also takes Xanax as needed for anxiety during flights.    MEDICATIONS  atorvastatin, pantoprazole, lisinopril, baby aspirin, Xanax (as needed), Aleve, Tylenol, hydrocodone       Past Medical History:   Past Medical History:   Diagnosis Date    Anxiety     Cholelithiasis gallbladder removed     Coronary artery disease     Depression     Diverticulosis     Erosive gastritis     Family hx of colon cancer     GERD (gastroesophageal reflux disease)     H/O CT scan of abdomen 2014    ddd, tics, tortuosity w/aortoiliac atherosclerosis, HH, cardiac enlargement    Heart attack     Hepatitis     HEP C    Hiatal hernia     History of CT scan of abdomen     Hyperlipidemia     Hypertension     Obese     Thoracic aortic aneurysm without rupture        Past Surgical  History:   Past Surgical History:   Procedure Laterality Date    CERVICAL DISCECTOMY ANTERIOR      anterior spinal, osteophytectomy cerv interspace    CERVICAL DISCECTOMY ANTERIOR  12/07/2015    spinal; C5-C6 and C6-C7 anterior cervical discectomy and arthrodesis; Jon Lua    CHOLECYSTECTOMY      COLONOSCOPY  06/10/2016    himanshu Sterling M.D.    CORONARY ANGIOPLASTY WITH STENT PLACEMENT      CORONARY ANGIOPLASTY WITH STENT PLACEMENT N/A     10yrs ago with Dr Ruff at [facility]; debatable at whether he had an MI that admission    ENDOSCOPY N/A 12/28/2016    Medium sized HH, gastritis.      GALLBLADDER SURGERY      UPPER GASTROINTESTINAL ENDOSCOPY  02/12/2014    LA Grade A reflux esophagitis, HH, erosive gtastritis, bx       Immunizations:   Immunization History   Administered Date(s) Administered    ABRYSVO (RSV, 60+ or pregnant women 32-36 wks) 10/05/2023    COVID-19 (MODERNA) 12YRS+ (SPIKEVAX) 10/21/2023    COVID-19 (MODERNA) BIVALENT 12+YRS 11/09/2022    COVID-19 (PFIZER) 12YRS+ (COMIRNATY) 09/09/2024    COVID-19 (PFIZER) Purple Cap Monovalent 03/17/2021, 04/07/2021, 10/07/2021    COVID-19 (UNSPECIFIED) 04/06/2021, 10/21/2023    Covid-19 (Pfizer) Gray Cap Monovalent 04/06/2022    DTaP 01/02/2013    Flu Vaccine Quad PF >36MO 10/02/2017    Fluzone High-Dose 65+YRS 11/05/2019, 09/27/2020, 10/05/2023, 09/09/2024    Fluzone High-Dose 65+yrs 09/25/2020, 09/27/2020, 10/26/2022    Fluzone Quad >6mos (Multi-dose) 09/01/2021    Hepatitis A 07/26/2018, 02/06/2019    Pneumococcal Conjugate 13-Valent (PCV13) 05/16/2019    Pneumococcal Polysaccharide (PPSV23) 05/15/2020    Shingrix 08/19/2022, 02/01/2023    Tdap 05/30/2023    flucelvax quad pfs =>4 YRS 11/08/2018        Medications:     Current Outpatient Medications:     ALPRAZolam (XANAX) 0.5 MG tablet, Take 1 tablet by mouth 2 (Two) Times a Day As Needed for Anxiety., Disp: 10 tablet, Rfl: 0    aspirin 81 MG tablet, Take by mouth., Disp: , Rfl:     atorvastatin  "(LIPITOR) 40 MG tablet, Take 1 tablet by mouth Daily., Disp: 90 tablet, Rfl: 0    lisinopril (PRINIVIL,ZESTRIL) 5 MG tablet, Take 1 tablet by mouth Daily., Disp: 90 tablet, Rfl: 1    nitroglycerin (NITROSTAT) 0.4 MG SL tablet, Place 1 tablet under the tongue., Disp: , Rfl:     pantoprazole (PROTONIX) 40 MG EC tablet, Take 1 tablet by mouth 2 (Two) Times a Day., Disp: 180 tablet, Rfl: 3    sildenafil (VIAGRA) 100 MG tablet, Take 1 tablet by mouth Daily As Needed for Erectile Dysfunction. (Patient not taking: Reported on 1/16/2025), Disp: 20 tablet, Rfl: 0    sucralfate (Carafate) 1 g tablet, Take 1 tablet by mouth 4 (Four) Times a Day As Needed (upper abdominal pain)., Disp: 30 tablet, Rfl: 3    Allergies:   No Known Allergies    Family History:   Family History   Problem Relation Age of Onset    Colon cancer Mother     Diabetes Mother     Hypertension Mother     Breast cancer Sister        Social History:   Social History     Socioeconomic History    Marital status:    Tobacco Use    Smoking status: Never     Passive exposure: Never    Smokeless tobacco: Never   Vaping Use    Vaping status: Never Used   Substance and Sexual Activity    Alcohol use: Yes     Alcohol/week: 2.0 standard drinks of alcohol     Types: 2 Shots of liquor per week     Comment: socially    Drug use: No    Sexual activity: Yes     Partners: Female     Comment: too old to worry about it         Objective     Vital Signs  Pulse 65   Temp 96.6 °F (35.9 °C) (Temporal)   Resp 16   Ht 172.7 cm (67.99\")   Wt 113 kg (248 lb 4.8 oz)   SpO2 93%   BMI 37.76 kg/m²   Estimated body mass index is 37.76 kg/m² as calculated from the following:    Height as of this encounter: 172.7 cm (67.99\").    Weight as of this encounter: 113 kg (248 lb 4.8 oz).            Physical Exam  Constitutional:       General: He is not in acute distress.  HENT:      Head: Normocephalic and atraumatic.   Cardiovascular:      Rate and Rhythm: Normal rate.   Pulmonary:    "   Effort: Pulmonary effort is normal. No respiratory distress.   Musculoskeletal:      Lumbar back: Spasms present. No deformity or bony tenderness. Normal range of motion. Positive right straight leg raise test and positive left straight leg raise test.      Right lower leg: No edema.      Left lower leg: No edema.   Skin:     Findings: No rash.   Neurological:      Mental Status: He is alert and oriented to person, place, and time.   Psychiatric:         Mood and Affect: Mood normal.         Behavior: Behavior normal.         Thought Content: Thought content normal.         Judgment: Judgment normal.          Assessment and Plan     Assessment & Plan  1. Lumbar radiculopathy: Chronic.  - Prescription for prednisone: 60 mg for 7 days, followed by a taper of 40 mg for 2 days, 20 mg for 2 days, and 10 mg for 1 day. Prescription sent to [facility] in [place].  -Counseled patient that systemic glucocorticoid treatment may only provide partial relief for select patients with acute lumbosacral radiculopathy and that any benefit is likely modest.  Advised the patient that more long-term treatment for his back pain would come from physical therapy; however, given his imminent upcoming trip I believe it is reasonable to prescribe a short course of steroids in the hopes of helping improve his back pain in the short-term.  - Referral for physical therapy to address sciatic back pain long-term.    Follow-up  - Physical therapy referral.         Follow Up  No follow-ups on file.            Darrius Browning MD   [facility]  [address] [address] [address] [phone]    Patient or patient representative verbalized consent for the use of Ambient Listening during the visit with  Darrius Browning MD for chart documentation. 3/11/2025  09:58 EDT

## 2025-04-09 DIAGNOSIS — E29.1 TESTOSTERONE DEFICIENCY IN MALE: ICD-10-CM

## 2025-04-09 RX ORDER — SILDENAFIL 100 MG/1
100 TABLET, FILM COATED ORAL DAILY PRN
Qty: 20 TABLET | Refills: 0 | Status: SHIPPED | OUTPATIENT
Start: 2025-04-09

## 2025-04-21 DIAGNOSIS — E78.5 HYPERLIPIDEMIA, UNSPECIFIED HYPERLIPIDEMIA TYPE: Primary | ICD-10-CM

## 2025-04-21 DIAGNOSIS — R73.9 HYPERGLYCEMIA: ICD-10-CM

## 2025-04-21 DIAGNOSIS — I10 PRIMARY HYPERTENSION: ICD-10-CM

## 2025-04-26 LAB
ALBUMIN SERPL-MCNC: 4.6 G/DL (ref 3.5–5.2)
ALBUMIN/GLOB SERPL: 1.8 G/DL
ALP SERPL-CCNC: 127 U/L (ref 39–117)
ALT SERPL-CCNC: 16 U/L (ref 1–41)
AST SERPL-CCNC: 22 U/L (ref 1–40)
BASOPHILS # BLD AUTO: 0.08 10*3/MM3 (ref 0–0.2)
BASOPHILS NFR BLD AUTO: 0.8 % (ref 0–1.5)
BILIRUB SERPL-MCNC: 0.9 MG/DL (ref 0–1.2)
BUN SERPL-MCNC: 17 MG/DL (ref 8–23)
BUN/CREAT SERPL: 20.7 (ref 7–25)
CALCIUM SERPL-MCNC: 10.1 MG/DL (ref 8.6–10.5)
CHLORIDE SERPL-SCNC: 99 MMOL/L (ref 98–107)
CHOLEST SERPL-MCNC: 150 MG/DL (ref 0–200)
CO2 SERPL-SCNC: 28.6 MMOL/L (ref 22–29)
CREAT SERPL-MCNC: 0.82 MG/DL (ref 0.76–1.27)
EGFRCR SERPLBLD CKD-EPI 2021: 93.9 ML/MIN/1.73
EOSINOPHIL # BLD AUTO: 0.22 10*3/MM3 (ref 0–0.4)
EOSINOPHIL NFR BLD AUTO: 2.2 % (ref 0.3–6.2)
ERYTHROCYTE [DISTWIDTH] IN BLOOD BY AUTOMATED COUNT: 12.8 % (ref 12.3–15.4)
GLOBULIN SER CALC-MCNC: 2.6 GM/DL
GLUCOSE SERPL-MCNC: 89 MG/DL (ref 65–99)
HCT VFR BLD AUTO: 44.5 % (ref 37.5–51)
HDLC SERPL-MCNC: 51 MG/DL (ref 40–60)
HGB BLD-MCNC: 14.5 G/DL (ref 13–17.7)
IMM GRANULOCYTES # BLD AUTO: 0.04 10*3/MM3 (ref 0–0.05)
IMM GRANULOCYTES NFR BLD AUTO: 0.4 % (ref 0–0.5)
LDLC SERPL CALC-MCNC: 83 MG/DL (ref 0–100)
LDLC/HDLC SERPL: 1.61 {RATIO}
LYMPHOCYTES # BLD AUTO: 1.71 10*3/MM3 (ref 0.7–3.1)
LYMPHOCYTES NFR BLD AUTO: 16.9 % (ref 19.6–45.3)
MCH RBC QN AUTO: 29.8 PG (ref 26.6–33)
MCHC RBC AUTO-ENTMCNC: 32.6 G/DL (ref 31.5–35.7)
MCV RBC AUTO: 91.6 FL (ref 79–97)
MONOCYTES # BLD AUTO: 0.87 10*3/MM3 (ref 0.1–0.9)
MONOCYTES NFR BLD AUTO: 8.6 % (ref 5–12)
NEUTROPHILS # BLD AUTO: 7.2 10*3/MM3 (ref 1.7–7)
NEUTROPHILS NFR BLD AUTO: 71.1 % (ref 42.7–76)
NRBC BLD AUTO-RTO: 0 /100 WBC (ref 0–0.2)
PLATELET # BLD AUTO: 358 10*3/MM3 (ref 140–450)
POTASSIUM SERPL-SCNC: 4.5 MMOL/L (ref 3.5–5.2)
PROT SERPL-MCNC: 7.2 G/DL (ref 6–8.5)
RBC # BLD AUTO: 4.86 10*6/MM3 (ref 4.14–5.8)
SODIUM SERPL-SCNC: 137 MMOL/L (ref 136–145)
TRIGL SERPL-MCNC: 85 MG/DL (ref 0–150)
VLDLC SERPL CALC-MCNC: 16 MG/DL (ref 5–40)
WBC # BLD AUTO: 10.12 10*3/MM3 (ref 3.4–10.8)

## 2025-05-06 ENCOUNTER — OFFICE VISIT (OUTPATIENT)
Dept: FAMILY MEDICINE CLINIC | Facility: CLINIC | Age: 70
End: 2025-05-06
Payer: MEDICARE

## 2025-05-06 VITALS
SYSTOLIC BLOOD PRESSURE: 136 MMHG | HEIGHT: 68 IN | OXYGEN SATURATION: 96 % | TEMPERATURE: 98 F | WEIGHT: 249.6 LBS | BODY MASS INDEX: 37.83 KG/M2 | HEART RATE: 56 BPM | DIASTOLIC BLOOD PRESSURE: 78 MMHG | RESPIRATION RATE: 16 BRPM

## 2025-05-06 DIAGNOSIS — I10 PRIMARY HYPERTENSION: Primary | ICD-10-CM

## 2025-05-06 DIAGNOSIS — E66.812 CLASS 2 OBESITY DUE TO EXCESS CALORIES WITHOUT SERIOUS COMORBIDITY WITH BODY MASS INDEX (BMI) OF 37.0 TO 37.9 IN ADULT: ICD-10-CM

## 2025-05-06 DIAGNOSIS — E66.09 CLASS 2 OBESITY DUE TO EXCESS CALORIES WITHOUT SERIOUS COMORBIDITY WITH BODY MASS INDEX (BMI) OF 37.0 TO 37.9 IN ADULT: ICD-10-CM

## 2025-05-06 DIAGNOSIS — E78.5 HYPERLIPIDEMIA, UNSPECIFIED HYPERLIPIDEMIA TYPE: ICD-10-CM

## 2025-05-06 NOTE — PROGRESS NOTES
Subjective   Antony Tatum is a 69 y.o. male. Patient is here today for   Chief Complaint   Patient presents with    Primary Care Follow-Up     Labs        Primary Care Follow-Up    History of Present Illness  The patient presents for evaluation of hypertension and back pain.    He has been actively monitoring his blood pressure at home, which typically registers around 120 systolic. During his last visit, his blood pressure was recorded at 140/80. His current medication regimen includes a low dose of lisinopril and aspirin. Despite efforts to manage his blood pressure, it remains slightly elevated.    He reports intermittent back discomfort, which he attributes to his lifelong history of back issues that began in 1980. The pain comes and goes depending on his activities.    He has lost 24 pounds and is still working on further weight loss. His weight has been fluctuating over the holidays, but he tends to do better in the summer.      Vitals:    05/06/25 0858   BP: 136/78   Pulse: 56   Resp: 16   Temp: 98 °F (36.7 °C)   SpO2: 96%     Body mass index is 37.96 kg/m².    Past Medical History:   Diagnosis Date    Anxiety     Cholelithiasis gallbladder removed 2010    Coronary artery disease     Depression     Diverticulosis     Erosive gastritis     Family hx of colon cancer     GERD (gastroesophageal reflux disease)     H/O CT scan of abdomen 01/29/2014    ddd, tics, tortuosity w/aortoiliac atherosclerosis, HH, cardiac enlargement    Heart attack     Hepatitis     HEP C    Hiatal hernia     History of CT scan of abdomen     Hyperlipidemia     Hypertension     Obese     Thoracic aortic aneurysm without rupture       No Known Allergies   Social History     Socioeconomic History    Marital status:    Tobacco Use    Smoking status: Never     Passive exposure: Never    Smokeless tobacco: Never   Vaping Use    Vaping status: Never Used   Substance and Sexual Activity    Alcohol use: Yes     Alcohol/week: 2.0  standard drinks of alcohol     Types: 2 Shots of liquor per week     Comment: socially    Drug use: No    Sexual activity: Yes     Partners: Female     Comment: too old to worry about it        Current Outpatient Medications:     aspirin 81 MG tablet, Take by mouth., Disp: , Rfl:     atorvastatin (LIPITOR) 40 MG tablet, Take 1 tablet by mouth Daily., Disp: 90 tablet, Rfl: 0    lisinopril (PRINIVIL,ZESTRIL) 5 MG tablet, Take 1 tablet by mouth Daily., Disp: 90 tablet, Rfl: 1    nitroglycerin (NITROSTAT) 0.4 MG SL tablet, Place 1 tablet under the tongue., Disp: , Rfl:     pantoprazole (PROTONIX) 40 MG EC tablet, Take 1 tablet by mouth 2 (Two) Times a Day., Disp: 180 tablet, Rfl: 3    sildenafil (VIAGRA) 100 MG tablet, Take 1 tablet by mouth Daily As Needed for Erectile Dysfunction., Disp: 20 tablet, Rfl: 0    sucralfate (Carafate) 1 g tablet, Take 1 tablet by mouth 4 (Four) Times a Day As Needed (upper abdominal pain)., Disp: 30 tablet, Rfl: 3     Objective     Review of Systems    Physical Exam  Vitals and nursing note reviewed.   Constitutional:       Appearance: Normal appearance.   Cardiovascular:      Rate and Rhythm: Regular rhythm.      Heart sounds: Normal heart sounds. No murmur heard.     No gallop.   Pulmonary:      Effort: No respiratory distress.      Breath sounds: Normal breath sounds. No wheezing or rales.   Neurological:      Mental Status: He is alert and oriented to person, place, and time.   Psychiatric:         Mood and Affect: Mood normal.         Behavior: Behavior normal.         Thought Content: Thought content normal.       Physical Exam  Respiratory: Clear to auscultation, no wheezing, rales or rhonchi    Results  Labs   - Cholesterol levels: Normal    Assessment & Plan    Problems Addressed this Visit          Cardiac and Vasculature    Hyperlipidemia    Hypertension - Primary       Endocrine and Metabolic    Obese     Diagnoses         Codes Comments      Primary hypertension    -  Primary  ICD-10-CM: I10  ICD-9-CM: 401.9       Hyperlipidemia, unspecified hyperlipidemia type     ICD-10-CM: E78.5  ICD-9-CM: 272.4       Class 2 obesity due to excess calories without serious comorbidity with body mass index (BMI) of 37.0 to 37.9 in adult     ICD-10-CM: E66.812, E66.09, Z68.37  ICD-9-CM: 278.00, V85.37           Assessment & Plan  1. Hypertension.  - Blood pressure readings have consistently been slightly elevated, averaging around 140/80 mmHg.  - Currently on a low dose of lisinopril and aspirin.  I had asked him to increase his lisinopril to 10 mg dose but he preferred not to do this today.  - Advised to continue monitoring blood pressure at home.  - If blood pressure remains elevated, an increase in the dosage of lisinopril will be considered.    2. Back pain.  - Reports occasional back pain that comes and goes depending on activities.  - History of back problems since 1980.  - No current trouble with legs or significant back pain.  - No new treatment prescribed at this time.    3. Weight management.  - Lost 24 pounds and is still working on weight loss.  - Weight has been fluctuating over the holidays, but does better in the summer.  - Encouraged to continue efforts in weight loss, as losing an additional 25 to 30 pounds may help in managing blood pressure.  - No new medications or therapies prescribed for weight management.    Follow-up  - The patient will follow up in 6 months for a wellness visit and fasting labs.      No follow-ups on file.    Patient or patient representative verbalized consent for the use of Ambient Listening during the visit with  Jose A Falcon MD for chart documentation. 5/6/2025  12:49 EDT

## 2025-05-19 ENCOUNTER — OFFICE VISIT (OUTPATIENT)
Dept: FAMILY MEDICINE CLINIC | Facility: CLINIC | Age: 70
End: 2025-05-19
Payer: MEDICARE

## 2025-05-19 VITALS
OXYGEN SATURATION: 93 % | HEART RATE: 69 BPM | BODY MASS INDEX: 36.69 KG/M2 | RESPIRATION RATE: 16 BRPM | TEMPERATURE: 98.2 F | DIASTOLIC BLOOD PRESSURE: 70 MMHG | WEIGHT: 242.1 LBS | SYSTOLIC BLOOD PRESSURE: 108 MMHG | HEIGHT: 68 IN

## 2025-05-19 DIAGNOSIS — J20.8 ACUTE BACTERIAL BRONCHITIS: ICD-10-CM

## 2025-05-19 DIAGNOSIS — R05.9 COUGH, UNSPECIFIED TYPE: Primary | ICD-10-CM

## 2025-05-19 DIAGNOSIS — B96.89 ACUTE BACTERIAL BRONCHITIS: ICD-10-CM

## 2025-05-19 LAB
EXPIRATION DATE: ABNORMAL
FLUAV AG UPPER RESP QL IA.RAPID: NOT DETECTED
FLUBV AG UPPER RESP QL IA.RAPID: NOT DETECTED
INTERNAL CONTROL: ABNORMAL
Lab: ABNORMAL
SARS-COV-2 AG UPPER RESP QL IA.RAPID: NOT DETECTED

## 2025-05-19 RX ORDER — AZITHROMYCIN 250 MG/1
TABLET, FILM COATED ORAL
Qty: 6 TABLET | Refills: 0 | Status: SHIPPED | OUTPATIENT
Start: 2025-05-19

## 2025-05-20 NOTE — PROGRESS NOTES
Subjective   Antony Tatum is a 69 y.o. male. Patient is here today for   Chief Complaint   Patient presents with    Cough     X 8 Days        History of Present Illness  You have a cough which started about 2 weeks ago.  Initially his cough was not productive but it is now.  He has had his cough now for 2 weeks.    His wife started up with a cough but now she is over it.    He denies dyspnea.  He denies fevers or chills or dyspnea.  Cough  Associated symptoms: no shortness of breath      History of Present Illness        Vitals:    05/19/25 1547   BP: 108/70   Pulse: 69   Resp: 16   Temp: 98.2 °F (36.8 °C)   SpO2: 93%     Body mass index is 36.82 kg/m².    Past Medical History:   Diagnosis Date    Anxiety     Cholelithiasis gallbladder removed 2010    Coronary artery disease     Depression     Diverticulosis     Erosive gastritis     Family hx of colon cancer     GERD (gastroesophageal reflux disease)     H/O CT scan of abdomen 01/29/2014    ddd, tics, tortuosity w/aortoiliac atherosclerosis, HH, cardiac enlargement    Heart attack     Hepatitis     HEP C    Hiatal hernia     History of CT scan of abdomen     Hyperlipidemia     Hypertension     Obese     Thoracic aortic aneurysm without rupture       No Known Allergies   Social History     Socioeconomic History    Marital status:    Tobacco Use    Smoking status: Never     Passive exposure: Never    Smokeless tobacco: Never   Vaping Use    Vaping status: Never Used   Substance and Sexual Activity    Alcohol use: Yes     Alcohol/week: 2.0 standard drinks of alcohol     Types: 2 Shots of liquor per week     Comment: socially    Drug use: No    Sexual activity: Yes     Partners: Female     Comment: too old to worry about it        Current Outpatient Medications:     aspirin 81 MG tablet, Take by mouth., Disp: , Rfl:     atorvastatin (LIPITOR) 40 MG tablet, Take 1 tablet by mouth Daily., Disp: 90 tablet, Rfl: 0    lisinopril (PRINIVIL,ZESTRIL) 5 MG tablet,  Take 1 tablet by mouth Daily., Disp: 90 tablet, Rfl: 1    nitroglycerin (NITROSTAT) 0.4 MG SL tablet, Place 1 tablet under the tongue., Disp: , Rfl:     pantoprazole (PROTONIX) 40 MG EC tablet, Take 1 tablet by mouth 2 (Two) Times a Day., Disp: 180 tablet, Rfl: 3    sildenafil (VIAGRA) 100 MG tablet, Take 1 tablet by mouth Daily As Needed for Erectile Dysfunction., Disp: 20 tablet, Rfl: 0    sucralfate (Carafate) 1 g tablet, Take 1 tablet by mouth 4 (Four) Times a Day As Needed (upper abdominal pain)., Disp: 30 tablet, Rfl: 3    azithromycin (Zithromax Z-Akhil) 250 MG tablet, Take 2 tablets by mouth on day 1, then 1 tablet daily on days 2-5, Disp: 6 tablet, Rfl: 0     Objective     Review of Systems   Respiratory:  Positive for cough. Negative for choking and shortness of breath.        Physical Exam  Vitals and nursing note reviewed.   Constitutional:       Appearance: Normal appearance. He is obese.      Comments: Pleasant, neatly groomed, no distress.   Cardiovascular:      Rate and Rhythm: Regular rhythm.      Heart sounds: Normal heart sounds. No murmur heard.     No gallop.   Pulmonary:      Effort: No respiratory distress.      Breath sounds: Normal breath sounds. No wheezing or rales.   Neurological:      Mental Status: He is alert and oriented to person, place, and time.   Psychiatric:         Mood and Affect: Mood normal.         Behavior: Behavior normal.         Thought Content: Thought content normal.       Physical Exam      Results      Assessment & Plan    Problems Addressed this Visit          Pulmonary and Pneumonias    Acute bacterial bronchitis    Relevant Medications    azithromycin (Zithromax Z-Akhil) 250 MG tablet     Other Visit Diagnoses         Cough, unspecified type    -  Primary    Relevant Orders    POCT SARS-CoV-2 Antigen INGE + Flu (Completed)          Diagnoses         Codes Comments      Cough, unspecified type    -  Primary ICD-10-CM: R05.9  ICD-9-CM: 786.2       Acute bacterial  bronchitis     ICD-10-CM: J20.8, B96.89  ICD-9-CM: 466.0, 041.9           Assessment & Plan  Send out a prescription for Zithromax Z-Akhil to take as directed.  He can take Mucinex expectorant over-the-counter as needed.    Harmony know if he fails to improve.      No follow-ups on file.    Patient or patient representative verbalized consent for the use of Ambient Listening during the visit with  Jose A Falcon MD for chart documentation. 5/20/2025  13:14 EDT

## 2025-05-22 RX ORDER — HYDROCODONE POLISTIREX AND CHLORPHENIRAMINE POLISTIREX 10; 8 MG/5ML; MG/5ML
5 SUSPENSION, EXTENDED RELEASE ORAL EVERY 12 HOURS PRN
Qty: 180 ML | Refills: 0 | Status: SHIPPED | OUTPATIENT
Start: 2025-05-22

## 2025-06-12 RX ORDER — ATORVASTATIN CALCIUM 40 MG/1
40 TABLET, FILM COATED ORAL DAILY
Qty: 90 TABLET | Refills: 1 | Status: SHIPPED | OUTPATIENT
Start: 2025-06-12

## 2025-08-04 RX ORDER — LISINOPRIL 5 MG/1
5 TABLET ORAL DAILY
Qty: 90 TABLET | Refills: 1 | Status: SHIPPED | OUTPATIENT
Start: 2025-08-04

## 2025-08-11 ENCOUNTER — TELEPHONE (OUTPATIENT)
Dept: GASTROENTEROLOGY | Facility: CLINIC | Age: 70
End: 2025-08-11
Payer: MEDICARE

## 2025-08-11 RX ORDER — SUCRALFATE 1 G/1
1 TABLET ORAL 4 TIMES DAILY PRN
Qty: 30 TABLET | Refills: 1 | Status: SHIPPED | OUTPATIENT
Start: 2025-08-11

## 2025-08-19 ENCOUNTER — OFFICE VISIT (OUTPATIENT)
Dept: GASTROENTEROLOGY | Facility: CLINIC | Age: 70
End: 2025-08-19
Payer: MEDICARE

## 2025-08-19 VITALS
HEART RATE: 58 BPM | WEIGHT: 249.7 LBS | HEIGHT: 68 IN | TEMPERATURE: 97.2 F | BODY MASS INDEX: 37.84 KG/M2 | DIASTOLIC BLOOD PRESSURE: 76 MMHG | SYSTOLIC BLOOD PRESSURE: 118 MMHG

## 2025-08-19 DIAGNOSIS — Z80.0 FAMILY HISTORY OF COLON CANCER: ICD-10-CM

## 2025-08-19 DIAGNOSIS — K21.9 GERD WITHOUT ESOPHAGITIS: Primary | ICD-10-CM

## 2025-08-19 DIAGNOSIS — K21.9 GASTROESOPHAGEAL REFLUX DISEASE WITHOUT ESOPHAGITIS: ICD-10-CM

## 2025-08-19 DIAGNOSIS — R74.8 ELEVATED ALKALINE PHOSPHATASE LEVEL: ICD-10-CM

## 2025-08-19 PROCEDURE — 99214 OFFICE O/P EST MOD 30 MIN: CPT

## 2025-08-19 PROCEDURE — 1160F RVW MEDS BY RX/DR IN RCRD: CPT

## 2025-08-19 PROCEDURE — 1159F MED LIST DOCD IN RCRD: CPT

## 2025-08-19 PROCEDURE — 3078F DIAST BP <80 MM HG: CPT

## 2025-08-19 PROCEDURE — 3074F SYST BP LT 130 MM HG: CPT
